# Patient Record
Sex: MALE | Race: WHITE | NOT HISPANIC OR LATINO | URBAN - METROPOLITAN AREA
[De-identification: names, ages, dates, MRNs, and addresses within clinical notes are randomized per-mention and may not be internally consistent; named-entity substitution may affect disease eponyms.]

---

## 2017-06-29 ENCOUNTER — APPOINTMENT (OUTPATIENT)
Dept: URBAN - METROPOLITAN AREA CLINIC 219 | Age: 57
Setting detail: DERMATOLOGY
End: 2017-06-29

## 2017-06-29 DIAGNOSIS — L30.8 OTHER SPECIFIED DERMATITIS: ICD-10-CM

## 2017-06-29 DIAGNOSIS — L82.1 OTHER SEBORRHEIC KERATOSIS: ICD-10-CM

## 2017-06-29 DIAGNOSIS — B35.1 TINEA UNGUIUM: ICD-10-CM

## 2017-06-29 DIAGNOSIS — B07.8 OTHER VIRAL WARTS: ICD-10-CM

## 2017-06-29 DIAGNOSIS — L20.84 INTRINSIC (ALLERGIC) ECZEMA: ICD-10-CM

## 2017-06-29 DIAGNOSIS — Z80.8 FAMILY HISTORY OF MALIGNANT NEOPLASM OF OTHER ORGANS OR SYSTEMS: ICD-10-CM

## 2017-06-29 DIAGNOSIS — B35.3 TINEA PEDIS: ICD-10-CM

## 2017-06-29 PROBLEM — I48.91 UNSPECIFIED ATRIAL FIBRILLATION: Status: ACTIVE | Noted: 2017-06-29

## 2017-06-29 PROBLEM — E78.5 HYPERLIPIDEMIA, UNSPECIFIED: Status: ACTIVE | Noted: 2017-06-29

## 2017-06-29 PROBLEM — I10 ESSENTIAL (PRIMARY) HYPERTENSION: Status: ACTIVE | Noted: 2017-06-29

## 2017-06-29 PROCEDURE — OTHER TREATMENT REGIMEN: OTHER

## 2017-06-29 PROCEDURE — OTHER LIQUID NITROGEN: OTHER

## 2017-06-29 PROCEDURE — 99213 OFFICE O/P EST LOW 20 MIN: CPT | Mod: 25

## 2017-06-29 PROCEDURE — OTHER COUNSELING: OTHER

## 2017-06-29 PROCEDURE — 17110 DESTRUCT B9 LESION 1-14: CPT

## 2017-06-29 ASSESSMENT — LOCATION DETAILED DESCRIPTION DERM
LOCATION DETAILED: RIGHT DISTAL RADIAL DORSAL MIDDLE FINGER
LOCATION DETAILED: RIGHT MID DORSAL INDEX FINGER
LOCATION DETAILED: RIGHT INDEX FINGERTIP
LOCATION DETAILED: LEFT MEDIAL UPPER BACK
LOCATION DETAILED: LEFT KNEE
LOCATION DETAILED: RIGHT MIDDLE FINGERTIP
LOCATION DETAILED: LEFT VENTRAL MEDIAL PROXIMAL FOREARM
LOCATION DETAILED: LEFT VENTRAL PROXIMAL FOREARM
LOCATION DETAILED: LEFT INDEX FINGERTIP
LOCATION DETAILED: RIGHT GREAT TOENAIL
LOCATION DETAILED: LEFT GREAT TOENAIL

## 2017-06-29 ASSESSMENT — LOCATION SIMPLE DESCRIPTION DERM
LOCATION SIMPLE: RIGHT MIDDLE FINGER
LOCATION SIMPLE: LEFT KNEE
LOCATION SIMPLE: LEFT UPPER BACK
LOCATION SIMPLE: RIGHT INDEX FINGER
LOCATION SIMPLE: RIGHT GREAT TOE
LOCATION SIMPLE: LEFT FOREARM
LOCATION SIMPLE: LEFT GREAT TOE
LOCATION SIMPLE: LEFT INDEX FINGER

## 2017-06-29 ASSESSMENT — LOCATION ZONE DERM
LOCATION ZONE: TOENAIL
LOCATION ZONE: LEG
LOCATION ZONE: TRUNK
LOCATION ZONE: FINGER
LOCATION ZONE: ARM

## 2017-06-29 NOTE — PROCEDURE: TREATMENT REGIMEN
Detail Level: Detailed
Continue Regimen: Clobetasol cream twice a day as needed\\nO'Keeffe's working hands 3-4 times per day
Plan: Consider candida extract injections if no improvement
Detail Level: Simple
Continue Regimen: Naftin 2 % gel two times a day to affected toenails
Continue Regimen: Clobetasol Cream twice a day as needed for eczema flares
Continue Regimen: Zeasorb AF Powder\\nDrying technique\\nNaftin Gel 2% once a day

## 2017-06-29 NOTE — PROCEDURE: LIQUID NITROGEN
Include Z78.9 (Other Specified Conditions Influencing Health Status) As An Associated Diagnosis?: No
Number Of Freeze-Thaw Cycles: 2 freeze-thaw cycles
Consent: The patient's consent was obtained including but not limited to risks of crusting, scabbing, blistering, scarring, darker or lighter pigmentary change, recurrence, incomplete removal and infection.
Medical Necessity Information: It is in your best interest to select a reason for this procedure from the list below. All of these items fulfill various CMS LCD requirements except the new and changing color options.
Medical Necessity Clause: This procedure was medically necessary because the lesions that were treated were:
Post-Care Instructions: I reviewed with the patient in detail post-care instructions. Patient is to wear sunprotection, and avoid picking at any of the treated lesions. Pt may apply Vaseline to crusted or scabbing areas.
Detail Level: Detailed

## 2017-11-10 ENCOUNTER — APPOINTMENT (OUTPATIENT)
Dept: URBAN - METROPOLITAN AREA CLINIC 219 | Age: 57
Setting detail: DERMATOLOGY
End: 2017-11-10

## 2017-11-10 DIAGNOSIS — L57.0 ACTINIC KERATOSIS: ICD-10-CM

## 2017-11-10 DIAGNOSIS — B07.8 OTHER VIRAL WARTS: ICD-10-CM

## 2017-11-10 DIAGNOSIS — L71.8 OTHER ROSACEA: ICD-10-CM

## 2017-11-10 DIAGNOSIS — L82.1 OTHER SEBORRHEIC KERATOSIS: ICD-10-CM

## 2017-11-10 DIAGNOSIS — B35.1 TINEA UNGUIUM: ICD-10-CM

## 2017-11-10 DIAGNOSIS — L30.8 OTHER SPECIFIED DERMATITIS: ICD-10-CM

## 2017-11-10 DIAGNOSIS — L20.84 INTRINSIC (ALLERGIC) ECZEMA: ICD-10-CM

## 2017-11-10 DIAGNOSIS — B35.3 TINEA PEDIS: ICD-10-CM

## 2017-11-10 DIAGNOSIS — Z80.8 FAMILY HISTORY OF MALIGNANT NEOPLASM OF OTHER ORGANS OR SYSTEMS: ICD-10-CM

## 2017-11-10 PROBLEM — E78.5 HYPERLIPIDEMIA, UNSPECIFIED: Status: ACTIVE | Noted: 2017-11-10

## 2017-11-10 PROBLEM — J30.1 ALLERGIC RHINITIS DUE TO POLLEN: Status: ACTIVE | Noted: 2017-11-10

## 2017-11-10 PROBLEM — L85.3 XEROSIS CUTIS: Status: ACTIVE | Noted: 2017-11-10

## 2017-11-10 PROBLEM — I48.91 UNSPECIFIED ATRIAL FIBRILLATION: Status: ACTIVE | Noted: 2017-11-10

## 2017-11-10 PROBLEM — I10 ESSENTIAL (PRIMARY) HYPERTENSION: Status: ACTIVE | Noted: 2017-11-10

## 2017-11-10 PROCEDURE — OTHER LIQUID NITROGEN: OTHER

## 2017-11-10 PROCEDURE — OTHER REASSURANCE: OTHER

## 2017-11-10 PROCEDURE — OTHER TREATMENT REGIMEN: OTHER

## 2017-11-10 PROCEDURE — 99213 OFFICE O/P EST LOW 20 MIN: CPT | Mod: 25

## 2017-11-10 PROCEDURE — 17000 DESTRUCT PREMALG LESION: CPT | Mod: 59

## 2017-11-10 PROCEDURE — 17110 DESTRUCT B9 LESION 1-14: CPT

## 2017-11-10 PROCEDURE — OTHER COUNSELING: OTHER

## 2017-11-10 PROCEDURE — OTHER MIPS QUALITY: OTHER

## 2017-11-10 PROCEDURE — OTHER PRESCRIPTION: OTHER

## 2017-11-10 RX ORDER — METRONIDAZOLE 10 MG/G
APPLY GEL TOPICAL ONCE DAILY
Qty: 1 | Refills: 3 | Status: ERX

## 2017-11-10 ASSESSMENT — LOCATION DETAILED DESCRIPTION DERM
LOCATION DETAILED: RIGHT INDEX FINGERTIP
LOCATION DETAILED: RIGHT DISTAL VENTRAL THUMB
LOCATION DETAILED: INFERIOR THORACIC SPINE
LOCATION DETAILED: LEFT GREAT TOENAIL
LOCATION DETAILED: RIGHT GREAT TOENAIL
LOCATION DETAILED: RIGHT INFERIOR LATERAL FOREHEAD
LOCATION DETAILED: RIGHT MIDDLE FINGERTIP
LOCATION DETAILED: RIGHT PROXIMAL POSTERIOR UPPER ARM
LOCATION DETAILED: RIGHT DISTAL RADIAL DORSAL MIDDLE FINGER
LOCATION DETAILED: LEFT INDEX FINGERTIP
LOCATION DETAILED: PERIUNGUAL SKIN RIGHT MIDDLE FINGER

## 2017-11-10 ASSESSMENT — LOCATION ZONE DERM
LOCATION ZONE: FINGER
LOCATION ZONE: FACE
LOCATION ZONE: TRUNK
LOCATION ZONE: TOENAIL
LOCATION ZONE: ARM

## 2017-11-10 ASSESSMENT — LOCATION SIMPLE DESCRIPTION DERM
LOCATION SIMPLE: UPPER BACK
LOCATION SIMPLE: RIGHT FOREHEAD
LOCATION SIMPLE: RIGHT MIDDLE FINGER
LOCATION SIMPLE: RIGHT GREAT TOE
LOCATION SIMPLE: LEFT GREAT TOE
LOCATION SIMPLE: RIGHT UPPER ARM
LOCATION SIMPLE: RIGHT THUMB
LOCATION SIMPLE: RIGHT INDEX FINGER
LOCATION SIMPLE: LEFT INDEX FINGER

## 2017-11-10 NOTE — PROCEDURE: LIQUID NITROGEN
Medical Necessity Information: It is in your best interest to select a reason for this procedure from the list below. All of these items fulfill various CMS LCD requirements except the new and changing color options.
Add 52 Modifier (Optional): no
Post-Care Instructions: I reviewed with the patient in detail post-care instructions. Patient is to wear sunprotection, and avoid picking at any of the treated lesions. Pt may apply Vaseline to crusted or scabbing areas.
Number Of Freeze-Thaw Cycles: 1 freeze-thaw cycle
Consent: The patient's consent was obtained including but not limited to risks of crusting, scabbing, blistering, scarring, darker or lighter pigmentary change, recurrence, incomplete removal and infection.
Duration Of Freeze Thaw-Cycle (Seconds): 0
Medical Necessity Clause: This procedure was medically necessary because the lesions that were treated were:
Number Of Freeze-Thaw Cycles: 2 freeze-thaw cycles
Detail Level: Detailed

## 2017-11-10 NOTE — PROCEDURE: TREATMENT REGIMEN
Continue Regimen: Clobetasol cream twice a day as needed\\nO'Keeffe's working hands 3-4 times per day
Detail Level: Detailed
Continue Regimen: Naftin 2 % gel two times a day to affected toenails
Continue Regimen: Zeasorb AF Powder\\nDrying technique\\nNaftin Gel 2% once a day
Plan: Metrogel 1% once a day \\nDaily moisturizer with sunscreen
Detail Level: Simple
Plan: Compounded Salicylic Acid to residual as tolerated
Continue Regimen: Clobetasol Cream twice a day as needed for eczema flares

## 2018-04-27 ENCOUNTER — APPOINTMENT (OUTPATIENT)
Dept: URBAN - METROPOLITAN AREA CLINIC 219 | Age: 58
Setting detail: DERMATOLOGY
End: 2018-04-27

## 2018-04-27 DIAGNOSIS — Z80.8 FAMILY HISTORY OF MALIGNANT NEOPLASM OF OTHER ORGANS OR SYSTEMS: ICD-10-CM

## 2018-04-27 DIAGNOSIS — B35.3 TINEA PEDIS: ICD-10-CM

## 2018-04-27 DIAGNOSIS — L71.8 OTHER ROSACEA: ICD-10-CM

## 2018-04-27 DIAGNOSIS — L82.1 OTHER SEBORRHEIC KERATOSIS: ICD-10-CM

## 2018-04-27 DIAGNOSIS — B07.8 OTHER VIRAL WARTS: ICD-10-CM

## 2018-04-27 DIAGNOSIS — B35.1 TINEA UNGUIUM: ICD-10-CM

## 2018-04-27 DIAGNOSIS — L20.84 INTRINSIC (ALLERGIC) ECZEMA: ICD-10-CM

## 2018-04-27 DIAGNOSIS — L30.8 OTHER SPECIFIED DERMATITIS: ICD-10-CM

## 2018-04-27 PROBLEM — I48.91 UNSPECIFIED ATRIAL FIBRILLATION: Status: ACTIVE | Noted: 2018-04-27

## 2018-04-27 PROBLEM — I10 ESSENTIAL (PRIMARY) HYPERTENSION: Status: ACTIVE | Noted: 2018-04-27

## 2018-04-27 PROBLEM — L85.3 XEROSIS CUTIS: Status: ACTIVE | Noted: 2018-04-27

## 2018-04-27 PROCEDURE — 17110 DESTRUCT B9 LESION 1-14: CPT

## 2018-04-27 PROCEDURE — OTHER REASSURANCE: OTHER

## 2018-04-27 PROCEDURE — 99213 OFFICE O/P EST LOW 20 MIN: CPT | Mod: 25

## 2018-04-27 PROCEDURE — OTHER COUNSELING: OTHER

## 2018-04-27 PROCEDURE — OTHER LIQUID NITROGEN: OTHER

## 2018-04-27 PROCEDURE — OTHER PRESCRIPTION: OTHER

## 2018-04-27 PROCEDURE — OTHER TREATMENT REGIMEN: OTHER

## 2018-04-27 PROCEDURE — OTHER MIPS QUALITY: OTHER

## 2018-04-27 RX ORDER — CLOBETASOL PROPIONATE 0.5 MG/G
APPLY CREAM TOPICAL
Qty: 1 | Refills: 3 | Status: ERX

## 2018-04-27 RX ORDER — NAFTIFINE HYDROCHLORIDE 2 G/100G
APPLY GEL TOPICAL AS NEEDED
Qty: 1 | Refills: 3 | Status: ERX

## 2018-04-27 ASSESSMENT — LOCATION DETAILED DESCRIPTION DERM
LOCATION DETAILED: LEFT INDEX FINGERTIP
LOCATION DETAILED: RIGHT MIDDLE FINGERTIP
LOCATION DETAILED: RIGHT INDEX FINGERTIP
LOCATION DETAILED: RIGHT MID RADIAL DORSAL MIDDLE FINGER
LOCATION DETAILED: RIGHT SUPERIOR MEDIAL MIDBACK
LOCATION DETAILED: LEFT GREAT TOENAIL
LOCATION DETAILED: RIGHT GREAT TOENAIL

## 2018-04-27 ASSESSMENT — LOCATION ZONE DERM
LOCATION ZONE: TOENAIL
LOCATION ZONE: FINGER
LOCATION ZONE: TRUNK

## 2018-04-27 ASSESSMENT — LOCATION SIMPLE DESCRIPTION DERM
LOCATION SIMPLE: LEFT GREAT TOE
LOCATION SIMPLE: LEFT INDEX FINGER
LOCATION SIMPLE: RIGHT MIDDLE FINGER
LOCATION SIMPLE: RIGHT GREAT TOE
LOCATION SIMPLE: RIGHT LOWER BACK
LOCATION SIMPLE: RIGHT INDEX FINGER

## 2018-04-27 NOTE — PROCEDURE: TREATMENT REGIMEN
Detail Level: Detailed
Detail Level: Simple
Continue Regimen: Metrogel 1% once a day \\nDaily moisturizer with sunscreen
Continue Regimen: Naftin 2 % gel two times a day to affected toenails
Continue Regimen: Clobetasol cream twice a day as needed\\nO'Keeffe's working hands 3-4 times per day
Continue Regimen: Clobetasol Cream twice a day as needed for eczema flares
Continue Regimen: Compounded Salicylic Acid to residual as tolerated
Continue Regimen: Zeasorb AF Powder\\nDrying technique\\nNaftin Gel 2% twice a day

## 2018-04-27 NOTE — PROCEDURE: LIQUID NITROGEN
Detail Level: Detailed
Add 52 Modifier (Optional): no
Number Of Freeze-Thaw Cycles: 2 freeze-thaw cycles
Post-Care Instructions: I reviewed with the patient in detail post-care instructions. Patient is to wear sunprotection, and avoid picking at any of the treated lesions. Pt may apply Vaseline to crusted or scabbing areas.
Medical Necessity Information: It is in your best interest to select a reason for this procedure from the list below. All of these items fulfill various CMS LCD requirements except the new and changing color options.
Consent: The patient's consent was obtained including but not limited to risks of crusting, scabbing, blistering, scarring, darker or lighter pigmentary change, recurrence, incomplete removal and infection.
Medical Necessity Clause: This procedure was medically necessary because the lesions that were treated were:

## 2018-05-11 ENCOUNTER — APPOINTMENT (OUTPATIENT)
Age: 58
Setting detail: DERMATOLOGY
End: 2018-05-15

## 2018-05-11 DIAGNOSIS — D22 MELANOCYTIC NEVI: ICD-10-CM

## 2018-05-11 DIAGNOSIS — D485 NEOPLASM OF UNCERTAIN BEHAVIOR OF SKIN: ICD-10-CM

## 2018-05-11 DIAGNOSIS — L91.8 OTHER HYPERTROPHIC DISORDERS OF THE SKIN: ICD-10-CM

## 2018-05-11 PROBLEM — D48.5 NEOPLASM OF UNCERTAIN BEHAVIOR OF SKIN: Status: ACTIVE | Noted: 2018-05-11

## 2018-05-11 PROBLEM — D22.39 MELANOCYTIC NEVI OF OTHER PARTS OF FACE: Status: ACTIVE | Noted: 2018-05-11

## 2018-05-11 PROBLEM — D23.5 OTHER BENIGN NEOPLASM OF SKIN OF TRUNK: Status: ACTIVE | Noted: 2018-05-11

## 2018-05-11 PROCEDURE — OTHER DEFER: OTHER

## 2018-05-11 PROCEDURE — OTHER MIPS QUALITY: OTHER

## 2018-05-11 PROCEDURE — OTHER COUNSELING: OTHER

## 2018-05-11 PROCEDURE — 99203 OFFICE O/P NEW LOW 30 MIN: CPT | Mod: 25

## 2018-05-11 PROCEDURE — OTHER BIOPSY BY SHAVE METHOD: OTHER

## 2018-05-11 PROCEDURE — OTHER REASSURANCE: OTHER

## 2018-05-11 PROCEDURE — 11100: CPT

## 2018-05-11 ASSESSMENT — LOCATION SIMPLE DESCRIPTION DERM
LOCATION SIMPLE: RIGHT ANTERIOR NECK
LOCATION SIMPLE: LEFT ANTERIOR NECK
LOCATION SIMPLE: LEFT CHEEK
LOCATION SIMPLE: RIGHT CHEEK
LOCATION SIMPLE: INFERIOR FOREHEAD
LOCATION SIMPLE: LEFT NOSE

## 2018-05-11 ASSESSMENT — LOCATION ZONE DERM
LOCATION ZONE: NECK
LOCATION ZONE: NOSE
LOCATION ZONE: FACE

## 2018-05-11 ASSESSMENT — LOCATION DETAILED DESCRIPTION DERM
LOCATION DETAILED: RIGHT CENTRAL MALAR CHEEK
LOCATION DETAILED: LEFT NASAL SIDEWALL
LOCATION DETAILED: LEFT CLAVICULAR NECK
LOCATION DETAILED: RIGHT CLAVICULAR NECK
LOCATION DETAILED: INFERIOR MID FOREHEAD
LOCATION DETAILED: LEFT SUPERIOR MEDIAL BUCCAL CHEEK

## 2018-12-15 LAB
CREATININE, EXTERNAL: 1.13
HBA1C MFR BLD HPLC: 10.9 %
MICROALBUMIN UR TEST STR-MCNC: 3486.6 MG/DL

## 2019-02-14 ENCOUNTER — OFFICE VISIT (OUTPATIENT)
Dept: ENDOCRINOLOGY | Age: 59
End: 2019-02-14

## 2019-02-14 VITALS
WEIGHT: 315 LBS | BODY MASS INDEX: 42.66 KG/M2 | DIASTOLIC BLOOD PRESSURE: 75 MMHG | HEIGHT: 72 IN | HEART RATE: 77 BPM | SYSTOLIC BLOOD PRESSURE: 138 MMHG

## 2019-02-14 DIAGNOSIS — R80.9 TYPE 2 DIABETES MELLITUS WITH MICROALBUMINURIA, WITH LONG-TERM CURRENT USE OF INSULIN (HCC): Primary | ICD-10-CM

## 2019-02-14 DIAGNOSIS — Z79.4 TYPE 2 DIABETES MELLITUS WITH MICROALBUMINURIA, WITH LONG-TERM CURRENT USE OF INSULIN (HCC): Primary | ICD-10-CM

## 2019-02-14 DIAGNOSIS — E66.01 MORBID OBESITY (HCC): ICD-10-CM

## 2019-02-14 DIAGNOSIS — E11.29 TYPE 2 DIABETES MELLITUS WITH MICROALBUMINURIA, WITH LONG-TERM CURRENT USE OF INSULIN (HCC): Primary | ICD-10-CM

## 2019-02-14 RX ORDER — INSULIN LISPRO 100 [IU]/ML
20 INJECTION, SOLUTION INTRAVENOUS; SUBCUTANEOUS 2 TIMES DAILY
Refills: 0 | COMMUNITY
Start: 2019-01-21 | End: 2019-03-27 | Stop reason: SDUPTHER

## 2019-02-14 RX ORDER — INSULIN GLARGINE 100 [IU]/ML
74 INJECTION, SOLUTION SUBCUTANEOUS DAILY
Refills: 0 | COMMUNITY
Start: 2018-12-05

## 2019-02-14 RX ORDER — GLIPIZIDE 10 MG/1
10 TABLET, FILM COATED, EXTENDED RELEASE ORAL 2 TIMES DAILY
Refills: 0 | COMMUNITY
Start: 2018-12-20

## 2019-02-14 RX ORDER — ENALAPRIL MALEATE 20 MG/1
20 TABLET ORAL DAILY
Refills: 0 | COMMUNITY
Start: 2019-01-15

## 2019-02-14 RX ORDER — HYDROCHLOROTHIAZIDE 25 MG/1
TABLET ORAL
Refills: 0 | COMMUNITY
Start: 2019-01-30

## 2019-02-14 RX ORDER — METFORMIN HYDROCHLORIDE 1000 MG/1
1000 TABLET ORAL 2 TIMES DAILY
Refills: 0 | COMMUNITY
Start: 2018-12-20

## 2019-02-14 RX ORDER — AMLODIPINE BESYLATE 10 MG/1
10 TABLET ORAL DAILY
Refills: 1 | COMMUNITY
Start: 2018-12-20

## 2019-02-14 RX ORDER — ATENOLOL 100 MG/1
100 TABLET ORAL DAILY
Refills: 1 | COMMUNITY
Start: 2019-01-30

## 2019-02-14 RX ORDER — PRAVASTATIN SODIUM 80 MG/1
80 TABLET ORAL
Refills: 0 | COMMUNITY
Start: 2019-01-15

## 2019-02-14 NOTE — PROGRESS NOTES
Chief Complaint   Patient presents with    New Patient     PCP and Pharmacy verified    Diabetes     Patient stated that he has had a recent A1c done by Dr. Shaunna Guo Diabetic Foot Exam     Due    Other     Checks blood sugars once daily        HPI  This is a 58-year-old white male with a 10-year history of type 2 diabetes mellitus referred for evaluation and management. As far as he knows there is no family history of diabetes. His mother has pancreatic cancer. He says he was diagnosed with diabetes when he presented with a cyst on his left thigh. He was found to have a blood sugar of 300 and started on medications. He is apparently been on a number of medications including metformin, Januvia, Tanzeum, and most recently Basaglar 74 units at bedtime and Humalog 8 units with dinner. He works as a dispatcher for [employer]. His most recent A1c is 10.9%. He has marked microalbuminuria and a creatinine of 1.13. His current medical regimen is as follows:  Basaglar 74 units at bedtime  Humalog 8 units with dinner  Metformin 1000 mg twice daily  Glucotrol XL 10 mg twice daily    He works alternating days and nights from [address] [place] [address] to [employer] or [address] [place] [address]. He usually has a breakfast meal of oatmeal and yogurt and coffee. His midday meal is a lean cuisine. Adriana Shay is the main meal of the day. This can be a hamburger pork chop or fried chicken with starchy and non-starchy vegetables. He is trying to limit the pasta in his diet. However \"he loves his potatoes\". He brings us his One Touch ultra meter. We checked the blood sugars and there are days where he has fasting blood sugars of 150-200. There are other days where his blood sugars are in the 3-400 range. Most of these readings are consistent with the A1c of 10.9%. ROS  He denies chest pain, shortness of breath, constipation or diarrhea  No family history on file.      Social History     Socioeconomic History    Marital status: UNKNOWN     Spouse name: Not on file    Number of children: Not on file    Years of education: Not on file    Highest education level: Not on file   Tobacco Use    Smoking status: Former Smoker    Smokeless tobacco: Never Used        Vitals:    02/14/19 1121   BP: 138/75   Pulse: 77   Weight: 323 lb 12.8 oz (146.9 kg)   Height: 6' (1.829 m)   PainSc:   0 - No pain        Physical Examination: General appearance - alert, well appearing, and in no distress  Mental status - alert, oriented to person, place, and time  Neck - supple, no significant adenopathy  Chest - clear to auscultation, no wheezes, rales or rhonchi, symmetric air entry  Heart - normal rate, regular rhythm, normal S1, S2, no murmurs, rubs, clicks or gallops  Abdomen - soft, nontender, nondistended, no masses or organomegaly  Extremities - pedal edema there is marked brawny edema    Diabetic foot exam:     Left: Reflexes 1+     Vibratory sensation diminished    Filament test reduced sensation with micro filament   Pulse DP: 1+ (weak)   Pulse PT: 1+ (weak)   Deformities: None  Right: Reflexes 1+   Vibratory sensation diminished   Filament test reduced sensation with micro filament   Pulse DP: 1+ (weak)   Pulse PT: 1+ (weak)   Deformities: None      ASSESSMENT & PLAN  This gentleman has type 2 diabetes mellitus, morbid obesity, poor blood sugar control and evidence of diabetic neuropathy and diabetic kidney disease. He would appear to be an ideal candidate for a GLP-1. He says he was on it in the past and he did not think it did much for him. He also does describe the fact that when his grandson was born his son told him he had to quit smoking. He quit smoking but gained 30 pounds. I strategy is as follows:  1. We continued the Basaglar at 74 units in the evening  2. We suggested he increase the Humalog to 10 units with each of his 3 meals  3. We asked him to send us blood sugar readings. 4.  We will see him back in 1 month. We may well reconsider a GLP-1 for him.     ADDENDUM: Received blood sugars 3/7/2019    -250  -250    Will increase mealtime insulin to 15 units TID until next visit

## 2019-03-18 ENCOUNTER — OFFICE VISIT (OUTPATIENT)
Dept: ENDOCRINOLOGY | Age: 59
End: 2019-03-18

## 2019-03-18 VITALS
HEIGHT: 72 IN | SYSTOLIC BLOOD PRESSURE: 142 MMHG | HEART RATE: 77 BPM | WEIGHT: 315 LBS | BODY MASS INDEX: 42.66 KG/M2 | DIASTOLIC BLOOD PRESSURE: 73 MMHG

## 2019-03-18 DIAGNOSIS — R80.9 TYPE 2 DIABETES MELLITUS WITH MICROALBUMINURIA, WITH LONG-TERM CURRENT USE OF INSULIN (HCC): Primary | ICD-10-CM

## 2019-03-18 DIAGNOSIS — Z79.4 TYPE 2 DIABETES MELLITUS WITH MICROALBUMINURIA, WITH LONG-TERM CURRENT USE OF INSULIN (HCC): Primary | ICD-10-CM

## 2019-03-18 DIAGNOSIS — E11.29 TYPE 2 DIABETES MELLITUS WITH MICROALBUMINURIA, WITH LONG-TERM CURRENT USE OF INSULIN (HCC): Primary | ICD-10-CM

## 2019-03-18 DIAGNOSIS — E66.01 MORBID OBESITY (HCC): ICD-10-CM

## 2019-03-18 PROBLEM — E11.65 TYPE 2 DIABETES MELLITUS WITH HYPERGLYCEMIA, WITH LONG-TERM CURRENT USE OF INSULIN (HCC): Status: ACTIVE | Noted: 2019-03-18

## 2019-03-18 NOTE — PROGRESS NOTES
Mr. Karli Andrade returns for follow-up of his type 2 diabetes mellitus and morbid obesity. He has been taking Lantus insulin 74 units daily along with 20 units of Humalog with meals along with metformin 1000 mg twice daily. His blood sugars remain elevated. He has most of his blood sugar readings for the last month have been in the 180-300 range. Interestingly, he has a couple blood sugars first thing in the morning which are either 77 or 138 which is distinctly different from all the other readings. On closer conversation, it appears that the lower blood sugars are because he did not snack the night before. Unfortunately this does not happen very often but when it does happen he can have excellent blood sugars. He denies symptoms of hyper or hypoglycemia. Examination  Blood pressure 142/73  Pulse 77  Weight 327    Impression type 2 diabetes mellitus and morbid obesity with an elevated hemoglobin A1c and persistently elevated blood sugars with a few exceptions  Plan: We have done the followin. He was started on O'zempic  0.25 mg to be advanced to 0.5 mg weekly  2. We have asked him to for 1 week eliminate the bedtime snacks and see if the morning blood sugars are consistently better. We suspect that they will be.  3.  We will see him back in 1 month. We anticipate an increase to 1 mg of Ozempic going forward.

## 2019-03-20 DIAGNOSIS — Z79.4 TYPE 2 DIABETES MELLITUS WITH HYPERGLYCEMIA, WITH LONG-TERM CURRENT USE OF INSULIN (HCC): Primary | ICD-10-CM

## 2019-03-20 DIAGNOSIS — E11.65 TYPE 2 DIABETES MELLITUS WITH HYPERGLYCEMIA, WITH LONG-TERM CURRENT USE OF INSULIN (HCC): Primary | ICD-10-CM

## 2019-04-17 LAB — HBA1C MFR BLD HPLC: 7.9 %

## 2019-04-25 ENCOUNTER — OFFICE VISIT (OUTPATIENT)
Dept: ENDOCRINOLOGY | Age: 59
End: 2019-04-25

## 2019-04-25 VITALS
WEIGHT: 315 LBS | HEART RATE: 78 BPM | HEIGHT: 72 IN | BODY MASS INDEX: 42.66 KG/M2 | DIASTOLIC BLOOD PRESSURE: 83 MMHG | SYSTOLIC BLOOD PRESSURE: 155 MMHG

## 2019-04-25 DIAGNOSIS — Z79.4 TYPE 2 DIABETES MELLITUS WITH MICROALBUMINURIA, WITH LONG-TERM CURRENT USE OF INSULIN (HCC): Primary | ICD-10-CM

## 2019-04-25 DIAGNOSIS — R80.9 TYPE 2 DIABETES MELLITUS WITH MICROALBUMINURIA, WITH LONG-TERM CURRENT USE OF INSULIN (HCC): Primary | ICD-10-CM

## 2019-04-25 DIAGNOSIS — E11.29 TYPE 2 DIABETES MELLITUS WITH MICROALBUMINURIA, WITH LONG-TERM CURRENT USE OF INSULIN (HCC): Primary | ICD-10-CM

## 2019-04-25 NOTE — PROGRESS NOTES
Mr. Farhan James returns for follow-up of his type 2 diabetes mellitus. He is now on Ozempic 0.5 mg weekly, Basaglar insulin 74 units daily, metformin thousand milligrams twice daily, glipizide 10 mg twice daily and Humalog insulin 20 units with meals. Since we started the Ozempic, his A1c has fallen from 10.9% down to 7.9%. His blood sugars are in the range of 90-1 50 and is not having any hypoglycemia. He is very pleased. He also has what lost 6 pounds since we saw him in March. His diet is unchanged. He does continue to have occasional mild nausea with the Ozempic but this is clearly getting better from when we started with the medications. Examination  Blood pressure 155/83  Pulse 78  Weight 321    Impression type 2 diabetes mellitus with improving glucose control on combination therapy  Plan: We asked him to stay in touch. We anticipate decreasing the doses of the Humalog and then probably eliminating the glipizide as well. Given the intermittent nausea, we did not advance the Ozempic to 1 mg but will continue at 0.5 mg weekly. We will see him back in 5 to 6 weeks. We spent more than 25 mintutes face to face, more than 50% was in counseling regarding diet, exercise and carbohydrate intake.

## 2019-05-17 ENCOUNTER — TELEPHONE (OUTPATIENT)
Dept: ENDOCRINOLOGY | Age: 59
End: 2019-05-17

## 2019-05-17 NOTE — TELEPHONE ENCOUNTER
Spoke to pt. He cannot tolerate 0.5 mg Ozempic. BS improving but intolerable so will stop medication. ?  Restart at 0.25 mg

## 2019-06-03 ENCOUNTER — OFFICE VISIT (OUTPATIENT)
Dept: ENDOCRINOLOGY | Age: 59
End: 2019-06-03

## 2019-06-03 VITALS
RESPIRATION RATE: 16 BRPM | DIASTOLIC BLOOD PRESSURE: 80 MMHG | HEART RATE: 74 BPM | OXYGEN SATURATION: 97 % | BODY MASS INDEX: 42.66 KG/M2 | SYSTOLIC BLOOD PRESSURE: 157 MMHG | HEIGHT: 72 IN | WEIGHT: 315 LBS

## 2019-06-03 DIAGNOSIS — R80.9 TYPE 2 DIABETES MELLITUS WITH MICROALBUMINURIA, WITH LONG-TERM CURRENT USE OF INSULIN (HCC): Primary | ICD-10-CM

## 2019-06-03 DIAGNOSIS — E11.29 TYPE 2 DIABETES MELLITUS WITH MICROALBUMINURIA, WITH LONG-TERM CURRENT USE OF INSULIN (HCC): Primary | ICD-10-CM

## 2019-06-03 DIAGNOSIS — Z79.4 TYPE 2 DIABETES MELLITUS WITH MICROALBUMINURIA, WITH LONG-TERM CURRENT USE OF INSULIN (HCC): Primary | ICD-10-CM

## 2019-06-03 NOTE — PROGRESS NOTES
Lab Results   Component Value Date/Time    Hemoglobin A1c, External 7.9 04/17/2019    Hemoglobin A1c, External 10.9 12/15/2018     Lab Results   Component Value Date/Time    Hemoglobin A1c, External 7.9 04/17/2019     Diabetic Foot and Eye Exam HM Status   Topic Date Due    Eye Exam  10/31/1970    Diabetic Foot Care  03/08/2020

## 2019-06-03 NOTE — PROGRESS NOTES
Mr. Hortensia Butts returns for follow-up of his type 2 diabetes mellitus with morbid obesity and an A1c in April of 7.9%. He has been on metformin thousand milligrams twice daily, Basaglar 74 units daily, Humalog 20 units with meals, and glipizide 10 mg twice daily. We added Ozempic. He was started on 0.25 mg and then we and then increased to 0.5 mg.  He did reasonably well but then developed GI side effects and so has stopped the Ozempic. Since stopping the Ozempic, his blood sugars have gone up a bit. In general his fasting blood sugars are in the 1 20-1 80 range with higher numbers in the morning after dietary indiscretion the night before. Breakfast is usually eggs ham 2 pieces of toast and mosher. He usually does not have lunch. Dinner can be a pork chop BMs and green beans. Again when he has ice cream and cookies at night the blood sugar can be 250. He does tell us that since stopping the Ozempic, his GI side effects have improved significantly. Examination  Blood pressure 157/80  Pulse 74  Weight 320    Impression type 2 diabetes mellitus with improved glucose control on Ozempic and deteriorating glucose control now that he is off Ozempic for the last couple of weeks. Plan: We decided to cautiously go back to 0.25 mg weekly for the next 2 months. We will see him in 2 months and repeat his A1c at that time. He will continue the other medications as noted above. We spent more than 25 mintutes face to face, more than 50% was in counseling regarding diet, exercise and carbohydrate intake.

## 2019-09-11 ENCOUNTER — OFFICE VISIT (OUTPATIENT)
Dept: ENDOCRINOLOGY | Age: 59
End: 2019-09-11

## 2019-09-11 VITALS
BODY MASS INDEX: 42.66 KG/M2 | HEART RATE: 82 BPM | WEIGHT: 315 LBS | DIASTOLIC BLOOD PRESSURE: 80 MMHG | HEIGHT: 72 IN | SYSTOLIC BLOOD PRESSURE: 134 MMHG

## 2019-09-11 DIAGNOSIS — E11.29 TYPE 2 DIABETES MELLITUS WITH MICROALBUMINURIA, WITH LONG-TERM CURRENT USE OF INSULIN (HCC): Primary | ICD-10-CM

## 2019-09-11 DIAGNOSIS — R80.9 TYPE 2 DIABETES MELLITUS WITH MICROALBUMINURIA, WITH LONG-TERM CURRENT USE OF INSULIN (HCC): Primary | ICD-10-CM

## 2019-09-11 DIAGNOSIS — Z79.4 TYPE 2 DIABETES MELLITUS WITH MICROALBUMINURIA, WITH LONG-TERM CURRENT USE OF INSULIN (HCC): Primary | ICD-10-CM

## 2019-09-11 LAB — HBA1C MFR BLD HPLC: 6.1 %

## 2019-09-11 RX ORDER — FUROSEMIDE 20 MG/1
20 TABLET ORAL DAILY
Qty: 90 TAB | Refills: 3 | Status: SHIPPED | OUTPATIENT
Start: 2019-09-11 | End: 2020-08-21 | Stop reason: SDUPTHER

## 2019-09-11 NOTE — PROGRESS NOTES
Mr. Reynaldo Baca returns for follow-up of his type 2 diabetes mellitus with morbid obesity and an A1c in April of 7.9%. He has been on metformin thousand milligrams twice daily, Basaglar 74 units daily, Humalog 20 units with meals, and glipizide 10 mg twice daily. We added Ozempic. He was started on 0.25 mg and then we and then increased to 0.5 mg.  He did not tolerate the 0.5 mg dose and so he is back to doing 0.25 mg weekly for the most part he is tolerating that. His A1c now is down to 6.1%. So his current medical regimen is Ozempic 0.25 mg weekly, Basaglar insulin 74 units at bedtime, and Humalog insulin 20 units with meals. His blood sugars are really excellent and consistent with the A1c. He does complain of some swelling in his lower extremities but otherwise is without complaint. Examination  Blood pressure 134/80  Pulse 82  Weight 325    Impression type 2 diabetes mellitus morbid obesity but improved A1c on combination therapy. Plan:  1. We continue the Ozempic/Basaglar/Humalog but decrease the dose of Humalog to 15 units  2. I did write a prescription for Lasix to 20 mg to be taken as needed for peripheral edema. However we encouraged the use of compression stockings to reduce the edema without pharmacologic therapy. We will see him back in 3 months or sooner as needed. We spent more than 25 mintutes face to face, more than 50% was in counseling regarding diet, exercise and carbohydrate intake.

## 2019-12-11 ENCOUNTER — OFFICE VISIT (OUTPATIENT)
Dept: ENDOCRINOLOGY | Age: 59
End: 2019-12-11

## 2019-12-11 VITALS
WEIGHT: 315 LBS | DIASTOLIC BLOOD PRESSURE: 76 MMHG | HEIGHT: 72 IN | SYSTOLIC BLOOD PRESSURE: 156 MMHG | BODY MASS INDEX: 42.66 KG/M2 | HEART RATE: 89 BPM

## 2019-12-11 DIAGNOSIS — E11.29 TYPE 2 DIABETES MELLITUS WITH MICROALBUMINURIA, WITH LONG-TERM CURRENT USE OF INSULIN (HCC): Primary | ICD-10-CM

## 2019-12-11 DIAGNOSIS — Z79.4 TYPE 2 DIABETES MELLITUS WITH MICROALBUMINURIA, WITH LONG-TERM CURRENT USE OF INSULIN (HCC): Primary | ICD-10-CM

## 2019-12-11 DIAGNOSIS — R80.9 TYPE 2 DIABETES MELLITUS WITH MICROALBUMINURIA, WITH LONG-TERM CURRENT USE OF INSULIN (HCC): Primary | ICD-10-CM

## 2019-12-11 LAB — HBA1C MFR BLD HPLC: 6.7 %

## 2019-12-11 NOTE — PROGRESS NOTES
Mr. Modesto Ansari returns for follow-up of his type 2 diabetes mellitus with morbid obesity and an A1c in April of 7.9%. [facility] has been on metformin thousand milligrams twice daily, Basaglar 74 units daily, Humalog 20 units with meals, and glipizide 10 mg twice daily.  We added Tereza [place] was started on 0.25 mg and then we and then increased to 0.5 mg.  He did not tolerate the 0.5 mg dose and so he is back to doing 0.25 mg weekly for the most part he is tolerating that. At our last visit his A1c was down to 6.1%. His A1c today is 6.7%. So his current medical regimen is Ozempic 0.25 mg weekly, Basaglar insulin 74 units at bedtime, and Humalog insulin 20 units with meals. His blood sugars are really excellent and consistent with the A1c. He does complain of some swelling in his lower extremities but otherwise is without complaint. Since I saw him last, he developed an episode of Bell's palsy in October and required glucocorticoids for about 5 to 10 days. His blood sugar did increase significantly while on the steroids but they have come back down. His blood sugars for the last month the primarily ranging between 80 and 140 in the morning. He will occasionally see a higher reading but he understands what he is done to do that. He is not having any hypoglycemia. Examination  Blood pressure 156/76  Pulse 89  Weight 318 (down 7 pounds)    Impression type 2 diabetes mellitus on combination therapy with excellent A1c's with a slight exacerbation due to glucocorticoids for Bell's palsy  Plan: No change in the regimen were made. I expect that the next A1c will be better in the absence of steroids. We will see him back in 6 months or sooner. We spent more than 25 mintutes face to face, more than 50% was in counseling regarding diet, exercise and carbohydrate intake.

## 2020-04-20 RX ORDER — INSULIN LISPRO 100 [IU]/ML
INJECTION, SOLUTION INTRAVENOUS; SUBCUTANEOUS
Qty: 20 ADJUSTABLE DOSE PRE-FILLED PEN SYRINGE | Refills: 1 | Status: SHIPPED | OUTPATIENT
Start: 2020-04-20 | End: 2020-09-23 | Stop reason: SDUPTHER

## 2020-04-20 NOTE — TELEPHONE ENCOUNTER
Requested Prescriptions     Pending Prescriptions Disp Refills    insulin lispro (HumaLOG KwikPen Insulin) 100 unit/mL kwikpen 20 Adjustable Dose Pre-filled Pen Syringe 1     Sig: INJECT 20 UNITS SUBCUTANEOUSLY 3 TIMES A DAY WITH MEALS

## 2020-06-05 ENCOUNTER — TELEPHONE (OUTPATIENT)
Dept: ENDOCRINOLOGY | Age: 60
End: 2020-06-05

## 2020-06-05 DIAGNOSIS — E11.29 TYPE 2 DIABETES MELLITUS WITH MICROALBUMINURIA, WITH LONG-TERM CURRENT USE OF INSULIN (HCC): Primary | ICD-10-CM

## 2020-06-05 DIAGNOSIS — Z79.4 TYPE 2 DIABETES MELLITUS WITH MICROALBUMINURIA, WITH LONG-TERM CURRENT USE OF INSULIN (HCC): Primary | ICD-10-CM

## 2020-06-05 DIAGNOSIS — R80.9 TYPE 2 DIABETES MELLITUS WITH MICROALBUMINURIA, WITH LONG-TERM CURRENT USE OF INSULIN (HCC): Primary | ICD-10-CM

## 2020-06-05 NOTE — TELEPHONE ENCOUNTER
----- Message from Yoav Silverio sent at 6/3/2020  4:48 PM EDT -----  Regarding: Dr Sabiha Ledbetter Message/Vendor Calls    Caller's first and last name:      Reason for call:needs to move his appt 6/8/2020 at 8:30am  to another day in the afternoon due to his work schedule       Callback required yes/no and why:yes for reason given above      Best contact number(s):[phone]      Details to clarify the request:      Yoav Silverio

## 2020-06-09 NOTE — TELEPHONE ENCOUNTER
----- Message from Aleena Cline sent at 6/8/2020  4:56 PM EDT -----  Regarding: Dr. Elizabeth Webber  Patient return call    Caller's first and last name and relationship (if not the patient):  pt    Best contact number(s):  [phone]    Whose call is being returned:   The office    Details to clarify the request:      Aleena Cline

## 2020-06-09 NOTE — TELEPHONE ENCOUNTER
----- Message from Katlin Huynh sent at 6/8/2020  4:56 PM EDT -----  Regarding: Dr. Mare Bowers  Patient return call    Caller's first and last name and relationship (if not the patient):  pt    Best contact number(s):  [phone]    Whose call is being returned:   The office    Details to clarify the request:      Katlin Huynh

## 2020-06-10 DIAGNOSIS — R80.9 TYPE 2 DIABETES MELLITUS WITH MICROALBUMINURIA, WITH LONG-TERM CURRENT USE OF INSULIN (HCC): ICD-10-CM

## 2020-06-10 DIAGNOSIS — E11.29 TYPE 2 DIABETES MELLITUS WITH MICROALBUMINURIA, WITH LONG-TERM CURRENT USE OF INSULIN (HCC): ICD-10-CM

## 2020-06-10 DIAGNOSIS — Z79.4 TYPE 2 DIABETES MELLITUS WITH MICROALBUMINURIA, WITH LONG-TERM CURRENT USE OF INSULIN (HCC): ICD-10-CM

## 2020-06-10 RX ORDER — SEMAGLUTIDE 1.34 MG/ML
0.25 INJECTION, SOLUTION SUBCUTANEOUS
Qty: 1 BOX | Refills: 2 | Status: SHIPPED | OUTPATIENT
Start: 2020-06-10 | End: 2020-06-25 | Stop reason: SDUPTHER

## 2020-06-10 NOTE — TELEPHONE ENCOUNTER
Requested Prescriptions     Pending Prescriptions Disp Refills    semaglutide (Ozempic) 0.25 mg/0.2 mL (2 mg/1.5 mL) sub-q pen 1 Box      Si.25 mg by SubCUTAneous route every seven (7) days.

## 2020-06-12 LAB
EST. AVERAGE GLUCOSE BLD GHB EST-MCNC: 123 MG/DL
HBA1C MFR BLD: 5.9 % (ref 4.8–5.6)

## 2020-06-17 ENCOUNTER — VIRTUAL VISIT (OUTPATIENT)
Dept: ENDOCRINOLOGY | Age: 60
End: 2020-06-17

## 2020-06-17 DIAGNOSIS — Z79.4 TYPE 2 DIABETES MELLITUS WITH MICROALBUMINURIA, WITH LONG-TERM CURRENT USE OF INSULIN (HCC): Primary | ICD-10-CM

## 2020-06-17 DIAGNOSIS — R80.9 TYPE 2 DIABETES MELLITUS WITH MICROALBUMINURIA, WITH LONG-TERM CURRENT USE OF INSULIN (HCC): Primary | ICD-10-CM

## 2020-06-17 DIAGNOSIS — E11.29 TYPE 2 DIABETES MELLITUS WITH MICROALBUMINURIA, WITH LONG-TERM CURRENT USE OF INSULIN (HCC): Primary | ICD-10-CM

## 2020-06-17 DIAGNOSIS — E66.01 MORBID OBESITY (HCC): ICD-10-CM

## 2020-06-17 NOTE — PROGRESS NOTES
This is an established visit conducted via telemedicine using doxy. me vifdeo  The patient has been instructed that this meets HIPAA criteria ,that they may receive a bill for these services and acknowledges and agrees to this method of visitation. Mr. Jona Davis returns for follow-up of his type 2 diabetes mellitus with morbid obesity and an A1c in April of 7.9%. [facility] has been on metformin thousand milligrams twice daily, Basaglar 74 units daily, Humalog 20 units with meals, and glipizide 10 mg twice daily.  We added Ella Bars was started on 0.25 mg and then we and then increased to 0.5 mg.  He did not tolerate the 0.5 mg dose and so he is back to doing 0.25 mg weekly for the most part he is tolerating that. At our last visit his A1c was down to 6.1%. His A1c today is 6.7%. So his current medical regimen is Ozempic 0.25 mg weekly, Basaglar insulin 74 units at bedtime, and Humalog insulin 15 units with meals.  His blood sugars are really excellent and consistent with the A1c.  He does complain of some swelling in his lower extremities but otherwise is without complaint. He did submit to me a log of his blood sugars since February. He read all of his fasting blood sugars for the last 2 weeks and they are ranging from . He had a few days where his blood sugar was in the 250 range but he knows that was because he worked late and had Ramen noodles at 2 in the morning and did not take insulin for that. Otherwise he is doing very well. As noted above, his Humalog has dropped from 20 units down to 15 units because of the effectiveness of the Ozempic. Impression type 2 diabetes mellitus and morbid obesity with improving glucose control on combination therapy. Plan: I will send an A1c today. Please note the A1c that was reported as 5.9% in the computer is not his blood. We are working to get that taken off the reading. We will see him back in 4 to 6 months face-to-face.

## 2020-06-25 DIAGNOSIS — Z79.4 TYPE 2 DIABETES MELLITUS WITH MICROALBUMINURIA, WITH LONG-TERM CURRENT USE OF INSULIN (HCC): ICD-10-CM

## 2020-06-25 DIAGNOSIS — R80.9 TYPE 2 DIABETES MELLITUS WITH MICROALBUMINURIA, WITH LONG-TERM CURRENT USE OF INSULIN (HCC): ICD-10-CM

## 2020-06-25 DIAGNOSIS — E11.29 TYPE 2 DIABETES MELLITUS WITH MICROALBUMINURIA, WITH LONG-TERM CURRENT USE OF INSULIN (HCC): ICD-10-CM

## 2020-06-25 RX ORDER — SEMAGLUTIDE 1.34 MG/ML
0.25 INJECTION, SOLUTION SUBCUTANEOUS
Qty: 1 PEN | Refills: 2 | Status: SHIPPED | OUTPATIENT
Start: 2020-06-25

## 2020-08-21 DIAGNOSIS — R80.9 TYPE 2 DIABETES MELLITUS WITH MICROALBUMINURIA, WITH LONG-TERM CURRENT USE OF INSULIN (HCC): ICD-10-CM

## 2020-08-21 DIAGNOSIS — E11.29 TYPE 2 DIABETES MELLITUS WITH MICROALBUMINURIA, WITH LONG-TERM CURRENT USE OF INSULIN (HCC): ICD-10-CM

## 2020-08-21 DIAGNOSIS — Z79.4 TYPE 2 DIABETES MELLITUS WITH MICROALBUMINURIA, WITH LONG-TERM CURRENT USE OF INSULIN (HCC): ICD-10-CM

## 2020-08-21 RX ORDER — FUROSEMIDE 20 MG/1
20 TABLET ORAL DAILY
Qty: 90 TAB | Refills: 3 | Status: SHIPPED | OUTPATIENT
Start: 2020-08-21 | End: 2021-07-23

## 2020-08-21 NOTE — TELEPHONE ENCOUNTER
Requested Prescriptions     Pending Prescriptions Disp Refills    furosemide (LASIX) 20 mg tablet 90 Tab 3     Sig: Take 1 Tab by mouth daily.  Indications: visible water retention

## 2020-09-14 ENCOUNTER — TELEPHONE (OUTPATIENT)
Dept: ENDOCRINOLOGY | Age: 60
End: 2020-09-14

## 2020-09-14 NOTE — TELEPHONE ENCOUNTER
Ozempic 0.25 mg weekly, Basaglar insulin 74 units at bedtime, and Humalog insulin 15 units with meals. Glucose readings from June until September are excellent ranging between 90 and 150 with occasional excursions above and below but otherwise outstanding.

## 2020-09-23 RX ORDER — INSULIN LISPRO 100 [IU]/ML
INJECTION, SOLUTION INTRAVENOUS; SUBCUTANEOUS
Qty: 20 ADJUSTABLE DOSE PRE-FILLED PEN SYRINGE | Refills: 3 | Status: SHIPPED | OUTPATIENT
Start: 2020-09-23

## 2020-09-23 NOTE — TELEPHONE ENCOUNTER
Requested Prescriptions     Pending Prescriptions Disp Refills    insulin lispro (HumaLOG KwikPen Insulin) 100 unit/mL kwikpen 20 Adjustable Dose Pre-filled Pen Syringe 3     Sig: INJECT 20 UNITS SUBCUTANEOUSLY 3 TIMES A DAY WITH MEALS

## 2021-02-19 DIAGNOSIS — Z79.4 TYPE 2 DIABETES MELLITUS WITH MICROALBUMINURIA, WITH LONG-TERM CURRENT USE OF INSULIN (HCC): ICD-10-CM

## 2021-02-19 DIAGNOSIS — E11.29 TYPE 2 DIABETES MELLITUS WITH MICROALBUMINURIA, WITH LONG-TERM CURRENT USE OF INSULIN (HCC): ICD-10-CM

## 2021-02-19 DIAGNOSIS — R80.9 TYPE 2 DIABETES MELLITUS WITH MICROALBUMINURIA, WITH LONG-TERM CURRENT USE OF INSULIN (HCC): ICD-10-CM

## 2021-02-19 RX ORDER — SEMAGLUTIDE 1.34 MG/ML
0.25 INJECTION, SOLUTION SUBCUTANEOUS
Qty: 1 PEN | Refills: 3 | Status: SHIPPED | OUTPATIENT
Start: 2021-02-19

## 2021-02-19 NOTE — TELEPHONE ENCOUNTER
Requested Prescriptions     Pending Prescriptions Disp Refills    semaglutide (Ozempic) 0.25 mg/0.2 mL (2 mg/1.5 mL) sub-q pen       Si.25 mg by SubCUTAneous route every seven (7) days.

## 2021-03-10 ENCOUNTER — APPOINTMENT (OUTPATIENT)
Dept: PHYSICAL THERAPY | Age: 61
End: 2021-03-10

## 2021-03-31 ENCOUNTER — HOSPITAL ENCOUNTER (OUTPATIENT)
Dept: PHYSICAL THERAPY | Age: 61
Discharge: HOME | End: 2021-03-31
Payer: COMMERCIAL

## 2021-03-31 VITALS — WEIGHT: 315 LBS | BODY MASS INDEX: 42.66 KG/M2 | HEIGHT: 72 IN

## 2021-03-31 PROCEDURE — 97110 THERAPEUTIC EXERCISES: CPT

## 2021-03-31 PROCEDURE — 97140 MANUAL THERAPY 1/> REGIONS: CPT

## 2021-03-31 PROCEDURE — 97161 PT EVAL LOW COMPLEX 20 MIN: CPT

## 2021-03-31 NOTE — PROGRESS NOTES
[facility]  Lymphedema Clinic  [address] [place], [address], [address]    INITIAL EVALUATION    NAME: Monica Boucher AGE: 63 y.o. GENDER: male  DATE: 3/31/2021  REFERRING PHYSICIAN: Bryan Floyd MD  HISTORY AND BACKGROUND:   Primary Diagnosis:  · Lymphedema, not elsewhere classified (B LE's) (I89.0)  Other Treatment Diagnoses:   Abnormality of gait (R26.9)   Swelling not relieved by elevation (R60.9)   Morbid obesity (E66.01)   Diabetic condition (E11.69)  Date of Onset: 1/22/2021 - referral to the clinic  Present Symptoms and Functional Limitations: Patient reports that swelling in the legs began in August and he is unaware of any trigger for the swelling. He was referred to Vascular Surgery and had a L iliac vein stent 1/2021 but swelling did not improve. Patient self purchased a pair of knee high stockings which were too tight causing a small ulcer at the crease of his ankle. The wound has finally healed but he continues to have difficulty finding knee highs that fit well. The swelling in his legs and feet make it difficult to stand for long periods of time and will occasionally cause him to lose his balance. Lymphedema Life Impact Scale: Patient scores a 26 with 38% impairment  Past Medical History: No past medical history on file. No past surgical history on file. Per patient: DM type 2 and HTN. Current Medications:    Current Outpatient Medications   Medication Sig    semaglutide (Ozempic) 0.25 mg/0.2 mL (2 mg/1.5 mL) sub-q pen 0.25 mg by SubCUTAneous route every seven (7) days.  insulin lispro (HumaLOG KwikPen Insulin) 100 unit/mL kwikpen INJECT 20 UNITS SUBCUTANEOUSLY 3 TIMES A DAY WITH MEALS    furosemide (LASIX) 20 mg tablet Take 1 Tab by mouth daily. Indications: visible water retention    semaglutide (Ozempic) 0.25 mg/0.2 mL (2 mg/1.5 mL) sub-q pen 0.25 mg by SubCUTAneous route every seven (7) days.     OneTouch Ultra Blue Test Strip strip CHECK BLOOD SUGAR ONCE DAILY    amLODIPine (NORVASC) 10 mg tablet Take 10 mg by mouth daily.  atenolol (TENORMIN) 100 mg tablet Take 100 mg by mouth daily.  enalapril (VASOTEC) 20 mg tablet Take 20 mg by mouth daily.  glipiZIDE SR (GLUCOTROL XL) 10 mg CR tablet Take 10 mg by mouth two (2) times a day.  hydroCHLOROthiazide (HYDRODIURIL) 25 mg tablet take 1 tablet by mouth once daily    BASAGLSTEPHEN FERRARAPEN U-100 INSULIN 100 unit/mL (3 mL) inpn 74 Units by SubCUTAneous route daily.  metFORMIN (GLUCOPHAGE) 1,000 mg tablet 1,000 mg two (2) times a day.  pravastatin (PRAVACHOL) 80 mg tablet Take 80 mg by mouth nightly. No current facility-administered medications for this encounter. 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 Living Situation: Lives with his girlfriend in a one story house. Trainable Caregiver?: Yes  Mobility: Independent gait without any assistive device for community distances  Sleeping Arrangement:  in bed at night  Adaptive Equipment Owned: none  Other: Patient is able to don compression stockings and does have assistance if needed. Previous Therapy:  None  Compression/Lymphedema Equipment: self purchased knee high stockings of unknown brand or compression    SUBJECTIVE:   Patient reports having swelling in the legs since August. He purchased knee high stockings which caused a wound at the ankle. His girlfriend assisted with wound care using Neosporin ointment and the wound finally healed. Patient's goals for therapy: decrease swelling.    OBJECTIVE DATA SUMMARY:   EXAMINATION/PRESENTATION/DECISION MAKING:   Pain:   Patient reports 0/10 pre and post evaluation. Self-Care and ADLs: Modified independent with bathing and dressing. Skin and Tissue Assessment:  Dermal Status: Tenuous, Dry and Flaky lower legs and heels    Texture/Consistency: Fibrotic/Woody lower legs, boggy/brawny upper legs bilaterally    Pigmentation/Color Change: Hyperpigmented    Anomalies: Papillomas lower legs bilaterally    Circulatory: Vascular studies ruled out DVT in past, pulses difficult to palpate due to swelling in the feet. Nails: Fungus    Stemmers Sign: Positive    Height:  Height: 6' (182.9 cm)  Weight:  Weight: 149.8 kg (330 lb 3.2 oz)   BMI:  BMI (calculated): 44.8  (36 or greater: adversely affecting lymphedema)  Wound/Ulcer:  Recently healed wound at the crease of the L ankle anteriorly. Volumetric Measurements:   Right:  13,994.28 mL Left: 15,068. 13 mL   % Difference: 8% L LE > R LE Dominance: R   Range of Motion: generally decreased but functional   Strength: Not formally assessed 2* patient is able to complete all functional activities in the clinic today. Sensation:  Intact     Mobility:    Bed/Chair Mobility: Modified independent  Transfers: Modified independent  Gait: Independent  Endurance: Good - patient is sedentary    Safety:  Patient is alert and oriented: Yes  Safety awareness: Appears intact  Fall risk?: Yes  Patient given written fall prevention handout: Yes    Based on the above components, the patient evaluation is determined to be of the following complexity level: LOW     Evaluation Time: 2:00-2:25 pm    TREATMENT PROVIDED:   1. Treatment description:  Therapeutic Exercise and Procedure: Patient instructed in activity restrictions and exercise guidelines. Therapist demonstrated deep abdominal breathing and a remedial lymphedema range of motion exercise program to be done in sitting. Patient was able to verbalize and demonstrate the routine and deep abdominal breathing with fair performance.   Patient has been asked to complete breathing exercises and the decongestive exercise routine daily. Provided patient with written HEP to follow. Patient instructed to participate in walking program up to 25 mins per day as tolerated. Patient instructed to avoid sitting for long periods at one time. Treatment time:  3:10 - 3:25 pm   Minutes: 15    2. Treatment description:  Manual Therapy: Patient instructed in skin care principles and anatomy of the lymphatic system. Therapist able to demonstrate manual lymphatic drainage techniques for the drainage of LE's and skin care protocol:   Patient was educated in daily skin care with low PH lotion and manual lymph drainage techniques were demonstrated during skin care in the clinic today: washed the lower legs/feet with Dove Soap and applied Aquaphor/Remedy lotion to the lower legs and feet. Patient was educated in the prevention and treatment of skin injuries and signs and symptoms of infection. Discussed standard lymphedema precautions to include avoiding blood pressure readings, injections and IVs or other procedures/acts that could lead to broken skin on affected area, and avoiding excessive heat, resistive activity or altitude without compression garment. Patient was instructed to lie in bed for 15 minute intervals throughout the day to limit the effect of gravity on swelling. Discussed the role and benefit of multi-layer compression bandaging and the cost of bandaging supplies but bandaging is not an option at this time due to the time commitment, cost, and the 2 hour drive to and from our clinic. Provided patient with samples of compression garments, Circular knit, Flat-knit, Velcro and Foam based, and patient voiced interest in obtaining garments in a future visit. Will attempt to secure a vendor and check insurance coverage for garments prior to patient's next visit. Discussed the role and benefit of the vaso-pneumatic device and patient voiced interest in having a pump trial in a future visit.  Will contact the vendor, [employer], to check insurance benefits. Treatment time:  2:25-3:10 pm  Minutes: 39  ASSESSMENT:   Robert Butt is a 63 y.o. male who presents with L LE > R LE secondary lymphedema complicated by morbid obesity, skin changes with history of wound, and recent vascular surgery. Full LE volumetric measurements taken today reveal a percentage difference of 8% L LE > R LE. Patient would benefit from multi-layer compression bandaging for reduction of swelling in the legs but MLB is not feasible at this time. Swelling in the legs has not been managed well in the past with ready made garments. Patient is now interested in exploring other compression options for more effective management of swelling in order to reduce the risk of wounds and infections. This care is medically necessary due to the infection risk with lymphedema and to improve functional activities. CDT is necessary to resolve swelling to allow patient to return to wearing normal clothes/footwear and prevent worsening of symptoms, such as infections and/or hospitalizations. Patient will be independent with home program strategies to allow improved ADL ability and mobility and to allow patient to return to greatest functional independence. Rehabilitation potential is considered to be Fair. Factors which may influence rehabilitation potential include 2 hour drive to and from the clinic, sedentary lifestyle, obesity, and skin changes. Patient will benefit from 3 to 6 physical therapy visits over 12 weeks to optimize improvement in these areas. PLAN OF CARE:   Recommendations and Planned Interventions: Manual lymph drainage/decongestive therapy, Multi-layer compression bandaging (short-stretch), Compression garment fitting/provision, Lymphedema therapeutic exercise and Education in skin care and lymphedema precautions, caregiver education    GOALS  Short term goals  Time frame: to be met by 4/5/2021  1.   Patient will demonstrate knowledge of signs/symptoms of infections/cellulitis and be independent in skin care to prevent cellulitis. 2.  Patient will demonstrate independence in lymphedema home program of therapeutic exercises to improve circulation and decongest limb to improve ADLs. 3.  Patient will tolerate multi-layer bandages (MLB) and show measureable decrease in limb volume and/or participate in the selection process to allow ordering of home compression system (daytime, nighttime garments and pump as needed). Long term goals  Time frame: to be met by 6/26/2021  1. Patient will be independent with don/doff of compression system and use in order to prevent reaccumulation of fluid at discharge. 2.  Pt will be independent in self-MLD and show stable limb volumes showing decongestion and pt. ready for transition to independent restorative phase of lymphedema therapy. Patient has participated in goal setting and agrees to work toward plan of care. Patient was instructed to call if questions or concerns arise. Thank you for this referral.  Zandra Magaña, PT, CLT   Time Calculation: 85 mins    TREATMENT PLAN EFFECTIVE DATES:   3/31/2021 TO 6/26/2021  I have read the above plan of care for Manfred Bella. I certify the above prescribed services are required by this patient and are medically necessary. The above plan of care has been developed in conjunction with the lymphedema/physical therapist.       Physician Signature: ____________________________________Date:______________     Josie Quintana.  Madai Maya MD

## 2021-04-30 ENCOUNTER — HOSPITAL ENCOUNTER (OUTPATIENT)
Dept: PHYSICAL THERAPY | Age: 61
Discharge: HOME | End: 2021-04-30
Payer: COMMERCIAL

## 2021-04-30 PROCEDURE — 97140 MANUAL THERAPY 1/> REGIONS: CPT

## 2021-04-30 PROCEDURE — 97016 VASOPNEUMATIC DEVICE THERAPY: CPT

## 2021-04-30 NOTE — PROGRESS NOTES
LYMPHEDEMA PT DAILY TREATMENT NOTE - [facility] 2-15    Patient Name: Aileen Kay  Date:2021  : 1960  [x]  Patient  Verified  Payor: BLUE CROSS / Plan: Regis Olmos [id number] PPO / Product Type: PPO /    In time:1:15 pm Out time: 3:00 pm  Total Treatment Time (min): 105  Total Timed Codes (min): 105  Visit #:  2    Treatment Area: Lymphedema, not elsewhere classified [I89.0]    SUBJECTIVE  Pain Level (0-10 scale): 0/10  Any medication changes, allergies to medications, adverse drug reactions, diagnosis change, or new procedure performed?: [x] No    [] Yes (see summary sheet for update)  Subjective functional status/changes:    Patient reports noticing an increase in swelling in the legs after standing on a ladder painting the house. He banged his L leg on the ladder and now has a small cut on the leg. OBJECTIVE    5 min Therapeutic Exercise:  [] Danya Rule Exercises [x] Remedial Lymphedema Exercise Program - continued education in performing a routine daily. Discussed initiating a walking program up to 25 minutes per day. Discussed the role and benefit of compression garments with exercise. Patient instructed to avoid sitting long periods at one time at work by marching in place or walking every 30 to 45 minutes as tolerated. [] Axillary Web Exercise Program      [] Shoulder ROM Exercises   Rationale: Activate muscle pump to improve lymphatic fluid movement and decrease swelling to improve the patients ability to perform ADL and IADL skills and prevent worsening of swelling over time. 60 min Manual Therapy    Manual Lymphatic Drainage (MLD):  Area to decongest: LE's    Sequence used and effectiveness: Deferred due to pump trial.    Skin/wound care/debridement: Reviewed skin care principles:   Continued education in daily skin care with low pH lotion following manual lymph principles. Prevention of cellulitis - continued education in signs and symptoms.    Patient with small superficial wound on anterior L leg. No drainage or odor noted. Applied A and D ointment and covered with bandage. Applied multi-layer compression bandaging to: Discussed the role and benefit of multi-layer compression bandaging and the cost of bandaging supplies on evaluation but bandaging is not an option at this time due to the time commitment, cost, and the 2 hour drive to and from our clinic. Upper/Lower Extremity Compression: Measured for the following compression products for the LE's:    Style: Velcro    Brand: Circaid Juxta Fit Premium lower leg pieces size: X large full calf and size XX large, Farrow Wrap Classic Foot pieces size medium/long and size large/long, and two pairs of CirAid compressive undersocks 15-25 mmHg in the size large. Type: Non-Custom    Vendor: [employer]    Education: Educated patient in compression garment donning and doffing. Daily wear schedule. Daily laundering. Garment lifespan. Return and reordering process (by bringing prior garments into the clinic). Educated patient to monitor for redness or pressure points on the skin. If new pain occurs they should contact their therapist.    Patient/family able to verbalize understanding of education. Rationale: increase ROM, increase tissue extensibility and decrease edema  to improve the patients ability to perform ADL's and mobilize with ease. 40   Vasopneumatic Device:   Patient able to have a trial of the Flexitouch vaso-pneumatic pump during the visit today. Patient positioned in supine with L LE elevated on one pillow. Patient tolerated the pump trial performed by the rep from [facility] for approximately 40 minutes without complaints.  Pre and post pump measurements were taken:    L ankle: pre pump 35 cm, post pump 34.2 cm  L calf: pre pump 54.7 cm, post pump 51.5 cm  L knee: pre pump 48.4 cm, post pump 48.3 cm  L thigh: pre pump 60.2 cm, post pump 60.2 cm  Waist: pre pump 150 cm, post pump 150 cm Rationale: To improve lymphatic fluid movement and decrease swelling to improve the patients ability to perform ADL and IADL skills and prevent worsening of swelling over time. With   [] TE   [] TA   [x] MT   [] SC   [x] other: Patient Education: [x] Review HEP    [x] MLD Patient Education Continued education in self MLD technique with bathing and skin care. [] Progressed/Changed HEP based on:   [] positioning   [] Kinesiotape   [x] Skin care   [x] wound care   [x] other:  Role and benefit of compression garments and the pump  Compression bandaging/garment precautions reviewed: [x] Yes  [] No     Other Objective/Functional Measures: Girth measurements were taken today for sizing of velcro products. R ankle: 31.5 cm  R mid calf: 50.0 cm  R upper calf: 51.0 cm    L ankle: 35 cm  L mid calf:  52 cm  L upper calf:  55 cm    Pain Level (0-10 scale) post treatment: 0/10      ASSESSMENT/Changes in Function:   Patient is returning to the clinic for the first visit since evaluation 3/31/2021. Attending visits in our clinic is difficult for patient due to work commitments and the 2 hour drive to and from the clinic. Patient was interested in having a trial of the vaso-pneumatic pump and that was completed during the visit today. There was visible improvement as well as a decrease in girth measurements of the L calf and ankle with use of the pump. Patient would benefit from having a pump at home for management of swelling during the restorative phase of care. Since bandaging is not an option for patient at this time, he was measured for velcro products for the lower legs and feet. Patient would benefit from the use of adjustable compression products for management of swelling as he remains at risk for infection and continual skin changes in the legs. He is aware that the garments may take several weeks to process since they need to be authorized by insurance.         Patient will continue to benefit from skilled PT services to  address swelling, analyze and address soft tissue restrictions, analyze compression product fit and use, instruct in home lymphedema management program and modify and progress therapeutic interventions to attain remaining goals. [x]  See Plan of Care  []  See progress note/recertification  []  See Discharge Summary         Progress towards goals / Updated goals:  Short term goals  Time frame: to be met by 4/5/2021, will extend all short term goals to be met by 6/26/2021  1. Patient will demonstrate knowledge of signs/symptoms of infections/cellulitis and be independent in skin care to prevent cellulitis. Goal in progress. 2.  Patient will demonstrate independence in lymphedema home program of therapeutic exercises to improve circulation and decongest limb to improve ADLs. Goal in progress. 3.  Patient will tolerate multi-layer bandages (MLB) and show measureable decrease in limb volume and/or participate in the selection process to allow ordering of home compression system (daytime, nighttime garments and pump as needed). Goal in progress.      Long term goals  Time frame: to be met by 6/26/2021  1. Patient will be independent with don/doff of compression system and use in order to prevent reaccumulation of fluid at discharge. 2.  Pt will be independent in self-MLD and show stable limb volumes showing decongestion and pt. ready for transition to independent restorative phase of lymphedema therapy. Goal in progress.      PLAN  [x]  Upgrade activities as tolerated     [x]  Continue plan of care  []  Update interventions per flow sheet       []  Discharge due to:_  []  Other:_      Shawna Edwards, PT, CLT 4/30/2021

## 2021-06-01 RX ORDER — INSULIN LISPRO 100 [IU]/ML
INJECTION, SOLUTION INTRAVENOUS; SUBCUTANEOUS
Qty: 20 ADJUSTABLE DOSE PRE-FILLED PEN SYRINGE | Refills: 3 | OUTPATIENT
Start: 2021-06-01

## 2021-06-09 ENCOUNTER — HOSPITAL ENCOUNTER (OUTPATIENT)
Dept: PHYSICAL THERAPY | Age: 61
Discharge: HOME | End: 2021-06-09
Payer: COMMERCIAL

## 2021-06-09 PROCEDURE — 97140 MANUAL THERAPY 1/> REGIONS: CPT

## 2021-06-09 NOTE — PROGRESS NOTES
LYMPHEDEMA 90 DAY PROGRESS NOTE WITH UPDATED PLAN OF CARE - [facility] 2-15    Patient Name: Ryan Read  Date:2021  : 1960  [x]  Patient  Verified  Payor: BLUE CROSS / Plan: [facility] PPO / Product Type: PPO /    In time: 1:10 pm Out time: 2:25 pm  Total Treatment Time (min): 75  Total Timed Codes (min): 75  Visit #:  3    Treatment Area: Lymphedema, not elsewhere classified [I89.0]    SUBJECTIVE  Pain Level (0-10 scale): 0/10  Any medication changes, allergies to medications, adverse drug reactions, diagnosis change, or new procedure performed?: [x] No    [] Yes (see summary sheet for update)  Subjective functional status/changes:      Patient arrives to the visit today with his girlfriend. He reports noticing a wound on the bottom of his L foot approximately one week ago. The wound is not painful but has drainage. Patient reports that his girlfriend is cleaning the wound with Neosporin prior to applying a bandage. OBJECTIVE    0 min Therapeutic Exercise:  [] Lucillie Do Exercises [x] Remedial Lymphedema Exercise Program - continued education in performing the Active ROM routine daily. Patient is making an effort to avoid sitting for long periods at one time at work. Attempting to ambulate every 30 to 45 minutes. [] Axillary Web Exercise Program      [] Shoulder ROM Exercises   Rationale: Activate muscle pump to improve lymphatic fluid movement and decrease swelling to improve the patients ability to perform ADL and IADL skills and prevent worsening of swelling over time. 75 min Manual Therapy    Manual Lymphatic Drainage (MLD):  Area to decongest: LE's    Sequence used and effectiveness: Deferred   Skin/wound care/debridement: Reviewed skin care principles:   Patient arrives to the clinic with wound on the plantar surface of the L foot. Patient's girlfriend is cleaning the wound with Neosporin and applying a bandage.  Cautioned patient regarding the use of Neosporin on the wound and discussed the importance of keeping the wound covered. The wound appears to tunnel and is dry in appearance but there is drainage present on the bandage that was removed. No odor present. Wound cleaned and covered with 4X4 and paper tape prior to leaving the clinic. Prevention of cellulitis - continued education in signs and symptoms. _       Upper/Lower Extremity Compression: Patient received the following compression products and brought them to the clinic for fitting today. Style: Velcro    Brand: Circaid Juxta Fit Premium lower leg pieces size: X large full calf for the R LE and size XX large for the L LE, Farrow Wrap Classic Foot pieces size medium/long for the R LE and size large/long for the L LE, and two pairs of CirAid compressive undersocks 15-25 mmHg in the size large. Type: Non-Custom    Vendor: [employer]     The garments fit well and were comfortable to the patient. Patient was able to demonstrate the ability to don and doff the garments with verbal cues to avoid gaps and donning the garments too tight. Recommended that patient schedule an appointment with his PCP as soon as possible for assessment of the wound on the L foot. Discussed that patient should avoid use of compression products until wound is assessed and use of products is cleared by PCP. Education: Educated patient in compression garment donning and doffing. Daily wear schedule once cleared by physician. Daily laundering. Garment lifespan. Return and reordering process (by bringing prior garments into the clinic). Educated patient to monitor for redness or pressure points on the skin. If new pain occurs they should contact their therapist.    Patient/family able to verbalize understanding of education. Rationale: increase ROM, increase tissue extensibility and decrease edema  to improve the patients ability to perform ADL's and mobilize with ease.         Vasopneumatic Device:   Patient received the Flexitouch vaso-pneumatic device for home use and has used the pump two nights. Patient reports having difficulty getting the garments to fit comfortably. Recommended that patient contact the rep from [facility] to schedule a home visit for pump training and fitting of the garments. Patient will defer use of the pump and the home training session until cleared by physician for pump use. Rationale: To improve lymphatic fluid movement and decrease swelling to improve the patients ability to perform ADL and IADL skills and prevent worsening of swelling over time. With   [] TE   [] TA   [x] MT   [] SC   [] other: Patient Education: [x] Review HEP    [] MLD Patient Education   [] Progressed/Changed HEP based on:   [] positioning   [] Kinesiotape   [x] Skin care   [x] wound care   [x] other:  Fitting of compression products, risk of infection, instructed patient to follow up with PCP and patient plans to call to make an appointment today. Compression bandaging/garment precautions reviewed: [x] Yes -patient to avoid use of compression products and the vaso-pneumatic device until wound is assessed and patient is cleared by MD.      Other Objective/Functional Measures: Girth measurements taken today. R ankle: 32.5 cm  R mid calf: 51.5 cm  R upper calf: 53.8. cm    L ankle: 36 cm  L mid calf:  54 cm  L upper calf:  57 cm    Pain Level (0-10 scale) post treatment: 0/10      ASSESSMENT/Changes in Function:   Patient was fitted with the compression products for the lower legs and feet today. The garments fit well and are comfortable to the patient. Patient has received the vaso-pneumatic device for home use but has used it sporadically. Patient was instructed to avoid using all compression products and the vaso-pneumatic device at this time due to the presence of a new wound on the L foot.  Discussed the importance of following up with his physician as soon as possible and he plans to make an appointment today. Patient's plan of care will be extended until 9/4/21 so that patient can continue treatment in the clinic once cleared by his physician. Patient has met 1 out of 5 goals set on evaluation. Progress toward goals has been slow since it is difficult for patient to attend appointments due to work commitments and a two hour drive to and from the clinic. Patient will continue to benefit from skilled PT services to  address swelling, analyze and address soft tissue restrictions, analyze compression product fit and use, instruct in home lymphedema management program and modify and progress therapeutic interventions to attain remaining goals. Progress towards goals / Updated goals:  Short term goals  Time frame: to be met by 4/5/2021, will extend all short term goals to be met by 9/4/21  1. Patient will demonstrate knowledge of signs/symptoms of infections/cellulitis and be independent in skin care to prevent cellulitis. Goal in progress. 2.  Patient will demonstrate independence in lymphedema home program of therapeutic exercises to improve circulation and decongest limb to improve ADLs. Goal in progress. 3.  Patient will tolerate multi-layer bandages (MLB) and show measureable decrease in limb volume and/or participate in the selection process to allow ordering of home compression system (daytime, nighttime garments and pump as needed). Patient has received the home vaso-pneumatic device and compression products for the lower legs and feet. Goal met 6/9/21.     Long term goals  Time frame: to be met by 6/26/2021, goal will be extended to be met by 9/4/21  1. Patient will be independent with don/doff of compression system and use in order to prevent reaccumulation of fluid at discharge. Goal in progress. 2.  Pt will be independent in self-MLD and show stable limb volumes showing decongestion and pt. ready for transition to independent restorative phase of lymphedema therapy.  Goal in progress. PLAN  [x]  Upgrade activities as tolerated     [x]  Extend plan of care so that patient can follow up with his PCP regarding wound on the L foot and return to the clinic when safe to proceed with the vaso-pneumatic pump and compression garments. []  Update interventions per flow sheet       []  Discharge due to:_  []  Other:_      Gerber Vazquez, PT, CLT 6/9/2021   TREATMENT PLAN EFFECTIVE DATES:   6/9/2021 TO 9/4/2021  I have read the above plan of care for Aileen Kay. I certify the above prescribed services are required by this patient and are medically necessary. The above plan of care has been developed in conjunction with the lymphedema/physical therapist.   Physician Signature: ____________________________________________________      Damon Floyd MD         Date: _________________________________________________________________

## 2021-06-21 ENCOUNTER — APPOINTMENT (OUTPATIENT)
Dept: GENERAL RADIOLOGY | Age: 61
DRG: 623 | End: 2021-06-21
Attending: EMERGENCY MEDICINE
Payer: COMMERCIAL

## 2021-06-21 ENCOUNTER — HOSPITAL ENCOUNTER (INPATIENT)
Age: 61
LOS: 5 days | Discharge: HOME | DRG: 623 | End: 2021-06-26
Attending: EMERGENCY MEDICINE | Admitting: INTERNAL MEDICINE
Payer: COMMERCIAL

## 2021-06-21 ENCOUNTER — APPOINTMENT (OUTPATIENT)
Dept: MRI IMAGING | Age: 61
DRG: 623 | End: 2021-06-21
Attending: EMERGENCY MEDICINE
Payer: COMMERCIAL

## 2021-06-21 DIAGNOSIS — S91.302A NON-HEALING WOUND OF LEFT HEEL: Primary | ICD-10-CM

## 2021-06-21 DIAGNOSIS — L97.523 NON-HEALING ULCER OF LEFT FOOT WITH NECROSIS OF MUSCLE (HCC): ICD-10-CM

## 2021-06-21 PROBLEM — E11.65 TYPE 2 DIABETES MELLITUS WITH HYPERGLYCEMIA, WITH LONG-TERM CURRENT USE OF INSULIN (HCC): Chronic | Status: ACTIVE | Noted: 2019-03-18

## 2021-06-21 PROBLEM — E66.01 OBESITY, MORBID (HCC): Chronic | Status: ACTIVE | Noted: 2019-02-14

## 2021-06-21 PROBLEM — L03.116 CELLULITIS OF FOOT, LEFT: Status: ACTIVE | Noted: 2021-06-21

## 2021-06-21 PROBLEM — I77.1 ILIAC ARTERY STENOSIS, LEFT (HCC): Chronic | Status: ACTIVE | Noted: 2021-06-21

## 2021-06-21 PROBLEM — E11.621: Status: ACTIVE | Noted: 2021-06-21

## 2021-06-21 PROBLEM — Z79.4 TYPE 2 DIABETES MELLITUS WITH HYPERGLYCEMIA, WITH LONG-TERM CURRENT USE OF INSULIN (HCC): Chronic | Status: ACTIVE | Noted: 2019-03-18

## 2021-06-21 PROBLEM — L97.529 NON-HEALING ULCER OF LEFT FOOT (HCC): Status: ACTIVE | Noted: 2021-06-21

## 2021-06-21 PROBLEM — I89.0 LYMPHEDEMA OF BOTH LOWER EXTREMITIES: Chronic | Status: ACTIVE | Noted: 2021-06-21

## 2021-06-21 PROBLEM — L97.529: Status: ACTIVE | Noted: 2021-06-21

## 2021-06-21 LAB
ALBUMIN SERPL-MCNC: 2.2 G/DL (ref 3.5–5)
ALBUMIN/GLOB SERPL: 0.5 {RATIO} (ref 1.1–2.2)
ALP SERPL-CCNC: 102 U/L (ref 45–117)
ALT SERPL-CCNC: 12 U/L (ref 12–78)
ANION GAP SERPL CALC-SCNC: 3 MMOL/L (ref 5–15)
AST SERPL-CCNC: 10 U/L (ref 15–37)
BASOPHILS # BLD: 0.1 K/UL (ref 0–0.1)
BASOPHILS NFR BLD: 0 % (ref 0–1)
BILIRUB SERPL-MCNC: 0.2 MG/DL (ref 0.2–1)
BUN SERPL-MCNC: 33 MG/DL (ref 6–20)
BUN/CREAT SERPL: 18 (ref 12–20)
CALCIUM SERPL-MCNC: 9.1 MG/DL (ref 8.5–10.1)
CHLORIDE SERPL-SCNC: 104 MMOL/L (ref 97–108)
CO2 SERPL-SCNC: 31 MMOL/L (ref 21–32)
CREAT SERPL-MCNC: 1.86 MG/DL (ref 0.7–1.3)
CRP SERPL HS-MCNC: >9.5 MG/L
DIFFERENTIAL METHOD BLD: ABNORMAL
EOSINOPHIL # BLD: 0.5 K/UL (ref 0–0.4)
EOSINOPHIL NFR BLD: 3 % (ref 0–7)
ERYTHROCYTE [DISTWIDTH] IN BLOOD BY AUTOMATED COUNT: 13.1 % (ref 11.5–14.5)
ERYTHROCYTE [SEDIMENTATION RATE] IN BLOOD: 92 MM/HR (ref 0–20)
GLOBULIN SER CALC-MCNC: 4.7 G/DL (ref 2–4)
GLUCOSE SERPL-MCNC: 154 MG/DL (ref 65–100)
HCT VFR BLD AUTO: 38.8 % (ref 36.6–50.3)
HGB BLD-MCNC: 12.7 G/DL (ref 12.1–17)
IMM GRANULOCYTES # BLD AUTO: 0.1 K/UL (ref 0–0.04)
IMM GRANULOCYTES NFR BLD AUTO: 1 % (ref 0–0.5)
LYMPHOCYTES # BLD: 1.9 K/UL (ref 0.8–3.5)
LYMPHOCYTES NFR BLD: 13 % (ref 12–49)
MCH RBC QN AUTO: 28.7 PG (ref 26–34)
MCHC RBC AUTO-ENTMCNC: 32.7 G/DL (ref 30–36.5)
MCV RBC AUTO: 87.6 FL (ref 80–99)
MONOCYTES # BLD: 1 K/UL (ref 0–1)
MONOCYTES NFR BLD: 7 % (ref 5–13)
NEUTS SEG # BLD: 10.5 K/UL (ref 1.8–8)
NEUTS SEG NFR BLD: 76 % (ref 32–75)
NRBC # BLD: 0 K/UL (ref 0–0.01)
NRBC BLD-RTO: 0 PER 100 WBC
PLATELET # BLD AUTO: 281 K/UL (ref 150–400)
PMV BLD AUTO: 9.8 FL (ref 8.9–12.9)
POTASSIUM SERPL-SCNC: 4.5 MMOL/L (ref 3.5–5.1)
PROT SERPL-MCNC: 6.9 G/DL (ref 6.4–8.2)
RBC # BLD AUTO: 4.43 M/UL (ref 4.1–5.7)
SODIUM SERPL-SCNC: 138 MMOL/L (ref 136–145)
WBC # BLD AUTO: 14.1 K/UL (ref 4.1–11.1)

## 2021-06-21 PROCEDURE — 74011000258 HC RX REV CODE- 258: Performed by: INTERNAL MEDICINE

## 2021-06-21 PROCEDURE — 74011250636 HC RX REV CODE- 250/636: Performed by: INTERNAL MEDICINE

## 2021-06-21 PROCEDURE — 65270000029 HC RM PRIVATE

## 2021-06-21 PROCEDURE — 86141 C-REACTIVE PROTEIN HS: CPT

## 2021-06-21 PROCEDURE — 73630 X-RAY EXAM OF FOOT: CPT

## 2021-06-21 PROCEDURE — 80053 COMPREHEN METABOLIC PANEL: CPT

## 2021-06-21 PROCEDURE — 74011636320 HC RX REV CODE- 636/320: Performed by: INTERNAL MEDICINE

## 2021-06-21 PROCEDURE — A9576 INJ PROHANCE MULTIPACK: HCPCS | Performed by: INTERNAL MEDICINE

## 2021-06-21 PROCEDURE — 85025 COMPLETE CBC W/AUTO DIFF WBC: CPT

## 2021-06-21 PROCEDURE — 73720 MRI LWR EXTREMITY W/O&W/DYE: CPT

## 2021-06-21 PROCEDURE — 99284 EMERGENCY DEPT VISIT MOD MDM: CPT

## 2021-06-21 PROCEDURE — 36415 COLL VENOUS BLD VENIPUNCTURE: CPT

## 2021-06-21 PROCEDURE — 85652 RBC SED RATE AUTOMATED: CPT

## 2021-06-21 RX ORDER — CLOPIDOGREL BISULFATE 75 MG/1
75 TABLET ORAL DAILY
Status: DISCONTINUED | OUTPATIENT
Start: 2021-06-22 | End: 2021-06-26 | Stop reason: HOSPADM

## 2021-06-21 RX ORDER — VANCOMYCIN 2 GRAM/500 ML IN 0.9 % SODIUM CHLORIDE INTRAVENOUS
2000 EVERY 24 HOURS
Status: DISCONTINUED | OUTPATIENT
Start: 2021-06-21 | End: 2021-06-24 | Stop reason: DRUGHIGH

## 2021-06-21 RX ORDER — ACETAMINOPHEN 325 MG/1
650 TABLET ORAL
Status: DISCONTINUED | OUTPATIENT
Start: 2021-06-21 | End: 2021-06-26 | Stop reason: HOSPADM

## 2021-06-21 RX ORDER — PRAVASTATIN SODIUM 40 MG/1
80 TABLET ORAL
Status: DISCONTINUED | OUTPATIENT
Start: 2021-06-21 | End: 2021-06-26 | Stop reason: HOSPADM

## 2021-06-21 RX ORDER — GUAIFENESIN 100 MG/5ML
81 LIQUID (ML) ORAL DAILY
Status: DISCONTINUED | OUTPATIENT
Start: 2021-06-22 | End: 2021-06-22

## 2021-06-21 RX ORDER — INSULIN LISPRO 100 [IU]/ML
15 INJECTION, SOLUTION INTRAVENOUS; SUBCUTANEOUS
Status: DISCONTINUED | OUTPATIENT
Start: 2021-06-22 | End: 2021-06-24

## 2021-06-21 RX ORDER — AMLODIPINE BESYLATE 5 MG/1
10 TABLET ORAL DAILY
Status: DISCONTINUED | OUTPATIENT
Start: 2021-06-22 | End: 2021-06-26 | Stop reason: HOSPADM

## 2021-06-21 RX ORDER — METRONIDAZOLE 500 MG/100ML
500 INJECTION, SOLUTION INTRAVENOUS EVERY 12 HOURS
Status: DISCONTINUED | OUTPATIENT
Start: 2021-06-21 | End: 2021-06-26 | Stop reason: HOSPADM

## 2021-06-21 RX ORDER — ATENOLOL 50 MG/1
100 TABLET ORAL DAILY
Status: DISCONTINUED | OUTPATIENT
Start: 2021-06-22 | End: 2021-06-26 | Stop reason: HOSPADM

## 2021-06-21 RX ORDER — ONDANSETRON 2 MG/ML
4 INJECTION INTRAMUSCULAR; INTRAVENOUS
Status: DISCONTINUED | OUTPATIENT
Start: 2021-06-21 | End: 2021-06-26 | Stop reason: HOSPADM

## 2021-06-21 RX ORDER — SODIUM CHLORIDE 0.9 % (FLUSH) 0.9 %
5-40 SYRINGE (ML) INJECTION EVERY 8 HOURS
Status: DISCONTINUED | OUTPATIENT
Start: 2021-06-21 | End: 2021-06-26 | Stop reason: HOSPADM

## 2021-06-21 RX ORDER — DEXTROSE 50 % IN WATER (D50W) INTRAVENOUS SYRINGE
12.5-25 AS NEEDED
Status: DISCONTINUED | OUTPATIENT
Start: 2021-06-21 | End: 2021-06-26 | Stop reason: HOSPADM

## 2021-06-21 RX ORDER — MAGNESIUM SULFATE 100 %
4 CRYSTALS MISCELLANEOUS AS NEEDED
Status: DISCONTINUED | OUTPATIENT
Start: 2021-06-21 | End: 2021-06-26 | Stop reason: HOSPADM

## 2021-06-21 RX ORDER — INSULIN GLARGINE 100 [IU]/ML
74 INJECTION, SOLUTION SUBCUTANEOUS
Status: DISCONTINUED | OUTPATIENT
Start: 2021-06-21 | End: 2021-06-26 | Stop reason: HOSPADM

## 2021-06-21 RX ORDER — SODIUM CHLORIDE 0.9 % (FLUSH) 0.9 %
5-40 SYRINGE (ML) INJECTION AS NEEDED
Status: DISCONTINUED | OUTPATIENT
Start: 2021-06-21 | End: 2021-06-26 | Stop reason: HOSPADM

## 2021-06-21 RX ORDER — GLIPIZIDE 5 MG/1
5 TABLET ORAL
Status: DISCONTINUED | OUTPATIENT
Start: 2021-06-22 | End: 2021-06-24

## 2021-06-21 RX ORDER — METRONIDAZOLE 500 MG/100ML
500 INJECTION, SOLUTION INTRAVENOUS EVERY 8 HOURS
Status: DISCONTINUED | OUTPATIENT
Start: 2021-06-21 | End: 2021-06-21 | Stop reason: DRUGHIGH

## 2021-06-21 RX ORDER — ONDANSETRON 4 MG/1
4 TABLET, ORALLY DISINTEGRATING ORAL
Status: DISCONTINUED | OUTPATIENT
Start: 2021-06-21 | End: 2021-06-26 | Stop reason: HOSPADM

## 2021-06-21 RX ORDER — HEPARIN SODIUM 5000 [USP'U]/ML
7500 INJECTION, SOLUTION INTRAVENOUS; SUBCUTANEOUS EVERY 8 HOURS
Status: DISCONTINUED | OUTPATIENT
Start: 2021-06-21 | End: 2021-06-22

## 2021-06-21 RX ORDER — INSULIN LISPRO 100 [IU]/ML
INJECTION, SOLUTION INTRAVENOUS; SUBCUTANEOUS
Status: DISCONTINUED | OUTPATIENT
Start: 2021-06-21 | End: 2021-06-26 | Stop reason: HOSPADM

## 2021-06-21 RX ORDER — ACETAMINOPHEN 650 MG/1
650 SUPPOSITORY RECTAL
Status: DISCONTINUED | OUTPATIENT
Start: 2021-06-21 | End: 2021-06-26 | Stop reason: HOSPADM

## 2021-06-21 RX ADMIN — GADOTERIDOL 20 ML: 279.3 INJECTION, SOLUTION INTRAVENOUS at 21:30

## 2021-06-21 RX ADMIN — CEFEPIME HYDROCHLORIDE 2 G: 2 INJECTION, POWDER, FOR SOLUTION INTRAVENOUS at 22:23

## 2021-06-21 NOTE — H&P
Hospitalist Admission Note    NAME: Gerardo Cintron   :  1960   MRN:  [id number]     Date/Time:  2021 7:48 PM    Patient PCP: Ebony Peralta MD vascular surgery= Dr. Dariel Castañdea  ______________________________________________________________________  Given the patient's current clinical presentation, I have a high level of concern for decompensation if discharged from the emergency department. Complex decision making was performed, which includes reviewing the patient's available past medical records, laboratory results, and x-ray films. My assessment of this patient's clinical condition and my plan of care is as follows. Assessment / Plan:  Left foot cellulitis POA  With chronic left foot diabetic ulcer-nonhealing POA  Type 2 diabetes with long-term use of insulin with hyperglycemia POA  CKD stage III with likely diabetic nephropathy POA  Chronic Lymphedema of both lower extremities POA  Left ilac artery stenosis status post stent in 2021-as per patient  Hypertension  Tobacco abuse disorder POA  WBC 14.1  Afebrile  X-ray left foot= chronic plantar wound/midfoot with diffuse soft tissue swelling  Creatinine 1.8  Last hemoglobin A1c 5.9  CRP >9.5    Admit to medical bed  Diabetic cardiac diet for now, n.p.o. after midnight till seen by podiatrist in the morning and further OR plans drafted. Empiric IV antibiotics for now-IV vancomycin + cefepime + Flagyl for now  Check MRI left foot with and without contrast as recommended by vascular surgery on-call Dr Anthony José podiatry consult as per recommendation of vascular surgery  Patient apparently not on any antiplatelet agents-we will start him on aspirin and Plavix for now  Continue high-dose statin as at home  Fingersticks before every meal and at bedtime. ,  Continue home Lantus 74 units nightly, continue home lispro 15 units q. AC + sliding scale correction here  Holding home Ozempic q.  Weekly  Continue home glipizide 10 mg every 12 when eating, holding home metformin for now  Holding home Vasotec and hydrochlorothiazide, continue atenolol and amlodipine  Tobacco cessation counseling given in the ED    Morbid Obesity POA  Weight loss recommended      Code Status: Full code as per patient's wishes  Surrogate Decision Maker: Bre Avila (significant other) as per patient    DVT Prophylaxis: Subcu heparin  GI Prophylaxis: not indicated    Baseline: Patient lives with significant other at home, independent with ADLs, has been dealing with chronic lymphedema of both legs      Subjective:   CHIEF COMPLAINT: Nonhealing left foot plantar wound for 2 to 3 weeks    HISTORY OF PRESENT ILLNESS:     Allen Ahuja is a 63 y.o.  male who presents with above complaints from home at the instructions of Dr. Samia Rodney for wound check. Patient present with chief complaint of nonhealing left foot plantar wound for the past 2 to 3 weeks. History of having left iliac artery stenosis status post stent placed by vascular surgery Dr. Samia Rodney in January 2021. Patient has history of chronic lymphedema of both legs at baseline. Patient was found to have elevated WBC count at 14.1 with elevated CRP and x-ray left foot showing diffuse soft tissue swelling with a chronic plantar/midfoot wound on the work-up in the ED. We were asked to admit for work up and evaluation of the above problems. No past medical history on file. No past surgical history on file. Social History     Tobacco Use    Smoking status: Former Smoker    Smokeless tobacco: Never Used   Substance Use Topics    Alcohol use: Not on file      Family history  History of diabetes  No Known Allergies     Prior to Admission medications    Medication Sig Start Date End Date Taking? Authorizing Provider   semaglutide (Ozempic) 0.25 mg/0.2 mL (2 mg/1.5 mL) sub-q pen 0.25 mg by SubCUTAneous route every seven (7) days.  2/19/21   Billie Del Cid MD   insulin lispro (HumaLOG KwikPen Insulin) 100 unit/mL kwikpen INJECT 20 UNITS SUBCUTANEOUSLY 3 TIMES A DAY WITH MEALS 9/23/20   Garcia Zavala MD   furosemide (LASIX) 20 mg tablet Take 1 Tab by mouth daily. Indications: visible water retention 8/21/20   Garcia Zavala MD   semaglutide (Ozempic) 0.25 mg/0.2 mL (2 mg/1.5 mL) sub-q pen 0.25 mg by SubCUTAneous route every seven (7) days. 6/25/20   Garcia Zavala MD   OneTouch Ultra Blue Test Strip strip CHECK BLOOD SUGAR ONCE DAILY 5/6/20   Provider, Historical   amLODIPine (NORVASC) 10 mg tablet Take 10 mg by mouth daily. 12/20/18   Provider, Historical   atenolol (TENORMIN) 100 mg tablet Take 100 mg by mouth daily. 1/30/19   Provider, Historical   enalapril (VASOTEC) 20 mg tablet Take 20 mg by mouth daily. 1/15/19   Provider, Historical   glipiZIDE SR (GLUCOTROL XL) 10 mg CR tablet Take 10 mg by mouth two (2) times a day. 12/20/18   Provider, Historical   hydroCHLOROthiazide (HYDRODIURIL) 25 mg tablet take 1 tablet by mouth once daily 1/30/19   Provider, Historical   BASAGLAR KWIKPEN U-100 INSULIN 100 unit/mL (3 mL) inpn 74 Units by SubCUTAneous route daily. 12/5/18   Provider, Historical   metFORMIN (GLUCOPHAGE) 1,000 mg tablet 1,000 mg two (2) times a day. 12/20/18   Provider, Historical   pravastatin (PRAVACHOL) 80 mg tablet Take 80 mg by mouth nightly.  1/15/19   Provider, Historical       REVIEW OF SYSTEMS:         Total of 12 systems reviewed as follows:       POSITIVE= underlined text  Negative = text not underlined  General:  fever, chills, sweats, generalized weakness, weight loss/gain,      loss of appetite   Eyes:    blurred vision, eye pain, loss of vision, double vision  ENT:    rhinorrhea, pharyngitis   Respiratory:   cough, sputum production, SOB, FELIX, wheezing, pleuritic pain   Cardiology:   chest pain, palpitations, orthopnea, PND, edema, syncope   Gastrointestinal:  abdominal pain , N/V, diarrhea, dysphagia, constipation, bleeding   Genitourinary:  frequency, urgency, dysuria, hematuria, incontinence   Muskuloskeletal :  arthralgia, myalgia, back pain  Hematology:  easy bruising, nose or gum bleeding, lymphadenopathy   Dermatological: rash, ulceration, color change / jaundice  Endocrine:   hot flashes or polydipsia   Neurological:  headache, dizziness, confusion, focal weakness, paresthesia,     Speech difficulties, memory loss, gait difficulty  Psychological: Feelings of anxiety, depression, agitation    Objective:   VITALS:    Visit Vitals  BP (!) 148/61   Pulse 82   Temp 98 °F (36.7 °C)   Resp 16   Ht 6' (1.829 m)   Wt 150.3 kg (331 lb 5.6 oz)   SpO2 98%   BMI 44.94 kg/m²       PHYSICAL EXAM:    General:    Alert, cooperative, no distress, appears stated age. Morbid obesity+  HEENT: Atraumatic, anicteric sclerae, pink conjunctivae     No oral ulcers, mucosa moist, throat clear, dentition fair  Neck:  Supple, symmetrical,  thyroid: non tender  Lungs:   Clear to auscultation bilaterally. No Wheezing or Rhonchi. No rales. Chest wall:  No tenderness  No Accessory muscle use. Heart:   Regular  rhythm,  No  murmur   B/L LE edema+  Abdomen:   Soft, non-tender. Not distended. Bowel sounds normal  Extremities: No cyanosis. No clubbing, chronic bilateral lower extremity lymphedema noted    Skin turgor normal, Capillary refill normal, Radial dial pulse 2+,  Skin:     Left foot plantar surface wound/ulcer with nonhealing edges noted +  Psych:  Good insight. Not depressed. Not anxious or agitated. Neurologic: EOMs intact. No facial asymmetry. No aphasia or slurred speech. Symmetrical strength, Sensation grossly intact.  Alert and oriented X 4.     _______________________________________________________________________  Care Plan discussed with:    Comments   Patient x    Family      RN x    Care Manager                    Consultant:  aman Gutierrez   _______________________________________________________________________  Expected  Disposition:   Home with Family x   HH/PT/OT/RN ?   UMAIR/LTC    ROBERT ________________________________________________________________________  TOTAL TIME:  54 Minutes    Critical Care Provided     Minutes non procedure based      Comments    x Reviewed previous records   >50% of visit spent in counseling and coordination of care x Discussion with patient and questions answered       ________________________________________________________________________  Signed: Bebe Shore MD    Procedures: see electronic medical records for all procedures/Xrays and details which were not copied into this note but were reviewed prior to creation of Plan. LAB DATA REVIEWED:    Recent Results (from the past 24 hour(s))   METABOLIC PANEL, COMPREHENSIVE    Collection Time: 06/21/21  5:30 PM   Result Value Ref Range    Sodium 138 136 - 145 mmol/L    Potassium 4.5 3.5 - 5.1 mmol/L    Chloride 104 97 - 108 mmol/L    CO2 31 21 - 32 mmol/L    Anion gap 3 (L) 5 - 15 mmol/L    Glucose 154 (H) 65 - 100 mg/dL    BUN 33 (H) 6 - 20 MG/DL    Creatinine 1.86 (H) 0.70 - 1.30 MG/DL    BUN/Creatinine ratio 18 12 - 20      GFR est AA 45 (L) >60 ml/min/1.73m2    GFR est non-AA 37 (L) >60 ml/min/1.73m2    Calcium 9.1 8.5 - 10.1 MG/DL    Bilirubin, total 0.2 0.2 - 1.0 MG/DL    ALT (SGPT) 12 12 - 78 U/L    AST (SGOT) 10 (L) 15 - 37 U/L    Alk.  phosphatase 102 45 - 117 U/L    Protein, total 6.9 6.4 - 8.2 g/dL    Albumin 2.2 (L) 3.5 - 5.0 g/dL    Globulin 4.7 (H) 2.0 - 4.0 g/dL    A-G Ratio 0.5 (L) 1.1 - 2.2     CBC WITH AUTOMATED DIFF    Collection Time: 06/21/21  6:41 PM   Result Value Ref Range    WBC 14.1 (H) 4.1 - 11.1 K/uL    RBC 4.43 4.10 - 5.70 M/uL    HGB 12.7 12.1 - 17.0 g/dL    HCT 38.8 36.6 - 50.3 %    MCV 87.6 80.0 - 99.0 FL    MCH 28.7 26.0 - 34.0 PG    MCHC 32.7 30.0 - 36.5 g/dL    RDW 13.1 11.5 - 14.5 %    PLATELET 767 109 - 346 K/uL    MPV 9.8 8.9 - 12.9 FL    NRBC 0.0 0  WBC    ABSOLUTE NRBC 0.00 0.00 - 0.01 K/uL    NEUTROPHILS 76 (H) 32 - 75 %    LYMPHOCYTES 13 12 - 49 %    MONOCYTES 7 5 - 13 %    EOSINOPHILS 3 0 - 7 %    BASOPHILS 0 0 - 1 %    IMMATURE GRANULOCYTES 1 (H) 0.0 - 0.5 %    ABS. NEUTROPHILS 10.5 (H) 1.8 - 8.0 K/UL    ABS. LYMPHOCYTES 1.9 0.8 - 3.5 K/UL    ABS. MONOCYTES 1.0 0.0 - 1.0 K/UL    ABS. EOSINOPHILS 0.5 (H) 0.0 - 0.4 K/UL    ABS. BASOPHILS 0.1 0.0 - 0.1 K/UL    ABS. IMM.  GRANS. 0.1 (H) 0.00 - 0.04 K/UL    DF AUTOMATED     SED RATE (ESR)    Collection Time: 06/21/21  6:41 PM   Result Value Ref Range    Sed rate, automated 92 (H) 0 - 20 mm/hr

## 2021-06-21 NOTE — ED NOTES
Missael Coon MD spoke with Dr. Caryle Dales - no need for CHRIS at this time. Nursing supervisor called to notify vascular tech.

## 2021-06-21 NOTE — PROGRESS NOTES

## 2021-06-21 NOTE — ED PROVIDER NOTES
EMERGENCY DEPARTMENT HISTORY AND PHYSICAL EXAM     ----------------------------------------------------------------------------  Please note that this dictation was completed with [employer], the computer voice recognition software. Quite often unanticipated grammatical, syntax, homophones, and other interpretive errors are inadvertently transcribed by the computer software. Please disregard these errors. Please excuse any errors that have escaped final proofreading  ----------------------------------------------------------------------------      Date: 6/21/2021  Patient Name: Monica Boucher    History of Presenting Illness     Chief Complaint   Patient presents with   [facility] Wound Check     sent over by Dr Jeremiah Shepard to have a wound on his left foot evaluated. wound has been present for 2-3 weeks. He has lymphedema in his legs       History Provided By:  Patient, Dr. Jeremiah Shepard    HPI: Monica Boucher is a 63 y.o. male, with significant pmhx of diabetes, lymphedema, vascular insufficiency of lower extremities with iliac stent, who presents via private vehicle  to the ED with c/o nonhealing wound to plantar surface of left foot. This that he was referred to the emergency department for further evaluation by Dr. Eleni Rodriguez a vascular surgery. Upon contacting Dr. Eleni Rodriguez he notes that patient has normal ABIs in the office and a patent vascular stent in his iliac artery. Requests admission to hospital service with MRI, labs, podiatry evaluation tomorrow morning. Patient has no complaints at time of my evaluation. Patient specifically denies any associated fevers, chills, CP, SOB, nausea, vomiting, diarrhea, abd pain, changes in BM, urinary sxs, or headache. Social Hx: denies tobacco  denies EtOH , denies Illicit Drugs    There are no other complaints, changes, or physical findings at this time.      PCP: Monet Pike MD    No Known Allergies    Current Facility-Administered Medications   Medication Dose Route Frequency Provider Last Rate Last Admin    ondansetron [facility]) injection 4 mg  4 mg IntraVENous Q4H PRN Matt Villegas MD        acetaminophen (TYLENOL) tablet 650 mg  650 mg Oral Q6H PRN Matt Villegas MD         Current Outpatient Medications   Medication Sig Dispense Refill    semaglutide (Ozempic) 0.25 mg/0.2 mL (2 mg/1.5 mL) sub-q pen 0.25 mg by SubCUTAneous route every seven (7) days. 1 Pen 3    insulin lispro (HumaLOG KwikPen Insulin) 100 unit/mL kwikpen INJECT 20 UNITS SUBCUTANEOUSLY 3 TIMES A DAY WITH MEALS 20 Adjustable Dose Pre-filled Pen Syringe 3    furosemide (LASIX) 20 mg tablet Take 1 Tab by mouth daily. Indications: visible water retention 90 Tab 3    semaglutide (Ozempic) 0.25 mg/0.2 mL (2 mg/1.5 mL) sub-q pen 0.25 mg by SubCUTAneous route every seven (7) days. 1 Pen 2    OneTouch Ultra Blue Test Strip strip CHECK BLOOD SUGAR ONCE DAILY      amLODIPine (NORVASC) 10 mg tablet Take 10 mg by mouth daily. 1    atenolol (TENORMIN) 100 mg tablet Take 100 mg by mouth daily. 1    enalapril (VASOTEC) 20 mg tablet Take 20 mg by mouth daily. 0    glipiZIDE SR (GLUCOTROL XL) 10 mg CR tablet Take 10 mg by mouth two (2) times a day.  0    hydroCHLOROthiazide (HYDRODIURIL) 25 mg tablet take 1 tablet by mouth once daily  0    BASAGLAR KWIKPEN U-100 INSULIN 100 unit/mL (3 mL) inpn 74 Units by SubCUTAneous route daily. 0    metFORMIN (GLUCOPHAGE) 1,000 mg tablet 1,000 mg two (2) times a day.  0    pravastatin (PRAVACHOL) 80 mg tablet Take 80 mg by mouth nightly. 0       Past History     Past Medical History:  No past medical history on file. Past Surgical History:  No past surgical history on file. Family History:  No family history on file.     Social History:  Social History     Tobacco Use    Smoking status: Former Smoker    Smokeless tobacco: Never Used   Substance Use Topics    Alcohol use: Not on file    Drug use: Not on file       Allergies:  No Known Allergies      Review of Systems Review of Systems   Constitutional: Negative for chills and fever. HENT: Negative. Eyes: Negative. Respiratory: Negative for cough, chest tightness and shortness of breath. Cardiovascular: Negative for chest pain and leg swelling. Gastrointestinal: Negative for abdominal pain, diarrhea, nausea and vomiting. Endocrine: Negative. Genitourinary: Negative for difficulty urinating and dysuria. Musculoskeletal: Negative for myalgias. Skin: Positive for wound. Neurological: Negative. Psychiatric/Behavioral: Negative. All other systems reviewed and are negative. Physical Exam   Physical Exam  Vitals and nursing note reviewed. Constitutional:       General: He is not in acute distress. Appearance: He is well-developed. He is not diaphoretic. HENT:      Head: Normocephalic and atraumatic. Nose: Nose normal.      Mouth/Throat:      Pharynx: No oropharyngeal exudate. Eyes:      Conjunctiva/sclera: Conjunctivae normal.      Pupils: Pupils are equal, round, and reactive to light. Neck:      Vascular: No JVD. Cardiovascular:      Rate and Rhythm: Normal rate and regular rhythm. Pulses:           Posterior tibial pulses are 1+ on the left side. Heart sounds: Normal heart sounds. No murmur heard. No friction rub. Pulmonary:      Effort: Pulmonary effort is normal. No respiratory distress. Breath sounds: Normal breath sounds. No stridor. No wheezing or rales. Abdominal:      General: Bowel sounds are normal. There is no distension. Palpations: Abdomen is soft. Tenderness: There is no abdominal tenderness. There is no rebound. Musculoskeletal:         General: No tenderness. Normal range of motion. Cervical back: Normal range of motion and neck supple. Feet:    Feet:      Left foot:      Skin integrity: Ulcer and callus present. Comments: See picture for further details  Skin:     General: Skin is warm and dry.       Capillary Refill: Capillary refill takes less than 2 seconds. Findings: Lesion present. No rash. Neurological:      Mental Status: He is alert and oriented to person, place, and time. Cranial Nerves: No cranial nerve deficit. Psychiatric:         Speech: Speech normal.         Behavior: Behavior normal.         Thought Content: Thought content normal.         Judgment: Judgment normal.                       Diagnostic Study Results     Labs -     Recent Results (from the past 12 hour(s))   METABOLIC PANEL, COMPREHENSIVE    Collection Time: 06/21/21  5:30 PM   Result Value Ref Range    Sodium 138 136 - 145 mmol/L    Potassium 4.5 3.5 - 5.1 mmol/L    Chloride 104 97 - 108 mmol/L    CO2 31 21 - 32 mmol/L    Anion gap 3 (L) 5 - 15 mmol/L    Glucose 154 (H) 65 - 100 mg/dL    BUN 33 (H) 6 - 20 MG/DL    Creatinine 1.86 (H) 0.70 - 1.30 MG/DL    BUN/Creatinine ratio 18 12 - 20      GFR est AA 45 (L) >60 ml/min/1.73m2    GFR est non-AA 37 (L) >60 ml/min/1.73m2    Calcium 9.1 8.5 - 10.1 MG/DL    Bilirubin, total 0.2 0.2 - 1.0 MG/DL    ALT (SGPT) 12 12 - 78 U/L    AST (SGOT) 10 (L) 15 - 37 U/L    Alk. phosphatase 102 45 - 117 U/L    Protein, total 6.9 6.4 - 8.2 g/dL    Albumin 2.2 (L) 3.5 - 5.0 g/dL    Globulin 4.7 (H) 2.0 - 4.0 g/dL    A-G Ratio 0.5 (L) 1.1 - 2.2     CBC WITH AUTOMATED DIFF    Collection Time: 06/21/21  6:41 PM   Result Value Ref Range    WBC 14.1 (H) 4.1 - 11.1 K/uL    RBC 4.43 4.10 - 5.70 M/uL    HGB 12.7 12.1 - 17.0 g/dL    HCT 38.8 36.6 - 50.3 %    MCV 87.6 80.0 - 99.0 FL    MCH 28.7 26.0 - 34.0 PG    MCHC 32.7 30.0 - 36.5 g/dL    RDW 13.1 11.5 - 14.5 %    PLATELET 020 329 - 049 K/uL    MPV 9.8 8.9 - 12.9 FL    NRBC 0.0 0  WBC    ABSOLUTE NRBC 0.00 0.00 - 0.01 K/uL    NEUTROPHILS 76 (H) 32 - 75 %    LYMPHOCYTES 13 12 - 49 %    MONOCYTES 7 5 - 13 %    EOSINOPHILS 3 0 - 7 %    BASOPHILS 0 0 - 1 %    IMMATURE GRANULOCYTES 1 (H) 0.0 - 0.5 %    ABS. NEUTROPHILS 10.5 (H) 1.8 - 8.0 K/UL    ABS. LYMPHOCYTES 1.9 0.8 - 3.5 K/UL    ABS. MONOCYTES 1.0 0.0 - 1.0 K/UL    ABS. EOSINOPHILS 0.5 (H) 0.0 - 0.4 K/UL    ABS. BASOPHILS 0.1 0.0 - 0.1 K/UL    ABS. IMM. GRANS. 0.1 (H) 0.00 - 0.04 K/UL    DF AUTOMATED     SED RATE (ESR)    Collection Time: 06/21/21  6:41 PM   Result Value Ref Range    Sed rate, automated 92 (H) 0 - 20 mm/hr       Radiologic Studies -   XR FOOT LT MIN 3 V   Final Result   1. Soft tissue wound of the plantar aspect of the midfoot. 2. Charcot arthropathy in the midfoot. 3. Diffuse soft tissue swelling of the visualized extremity. MRI FOOT LT W WO CONT    (Results Pending)     CT Results  (Last 48 hours)    None        CXR Results  (Last 48 hours)    None            Medical Decision Making   I am the first provider for this patient. I reviewed the vital signs, available nursing notes, past medical history, past surgical history, family history and social history. Vital Signs-Reviewed the patient's vital signs. Patient Vitals for the past 12 hrs:   Temp Pulse Resp BP SpO2   06/21/21 1720 98 °F (36.7 °C) 82 16 (!) 148/61 98 %       Pulse Oximetry Analysis - 98% on RA, normal  Rate: 82 bpm  Rhythm: nsr      Provider Notes (Medical Decision Making):     DDX:  No healing diabetic foot ulcer    Plan:  Vascular surgery consult, labs, lactate, blood cultures, MRI, admission to hospitalist service and podiatry consult    Impression:  Diabetic foot ulcer, nonhealing    ED Course:   Initial assessment performed. The patients presenting problems have been discussed, and they are in agreement with the care plan formulated and outlined with them. I have encouraged them to ask questions as they arise throughout their visit.     I reviewed the nursing notes and and vital signs from today's visit, as well as the electronic medical record system for any past medical records that were available that may contribute to the patients current condition, including previous vascular surgery notes    Nursing notes will be reviewed as they become available in realtime while the pt has been in the ED. Emi Herrera MD    CONSULT NOTE:   7:16 PM  Emi Herrera MD spoke with Dr. Ara Piña,   Specialty: Vascular surgery  Consulted Dr. Ara Piña due to pt' referral to ED and discussion of plan. Discussed pt's HPI and available diagnostic results thus far. Consultant recommends MRI, admission to the hospitalist service, podiatry evaluation in the morning. Emi Herrera MD      PROGRESS NOTE:  7:20 PM  Pt and family made aware of plan for admission with podiatry evaluation after MRI. In agreement with plan. Emi Herrera MD    CONSULT NOTE:   7:24 PM  Emi Herrera MD spoke with Dr. Ty Maradiaga,   Specialty: Perfecto Maradiaga due to non healing DM ulcer to foot and vascular surgery referral.  Discussed pt's HPI and available diagnostic results thus far. Expressed concerns for needed admission. Consultant will evaluate for admission. Emi Herrera MD    ADMISSION NOTE:  7:24 PM  Patient is being admitted to the hospital by Dr. Ty Maradiaga. The results of their tests and reasons for their admission have been discussed with them and/or available family. They convey agreement and understanding for the need to be admitted and for their admission diagnosis. Emi Herrera MD               Critical Care Time:     none      Diagnosis     Clinical Impression:   1. Non-healing wound of left heel        PLAN:  1.   Admit to hospitalist service

## 2021-06-21 NOTE — ROUTINE PROCESS

## 2021-06-22 ENCOUNTER — ANESTHESIA (OUTPATIENT)
Dept: SURGERY | Age: 61
DRG: 623 | End: 2021-06-22
Payer: COMMERCIAL

## 2021-06-22 ENCOUNTER — ANESTHESIA EVENT (OUTPATIENT)
Dept: SURGERY | Age: 61
DRG: 623 | End: 2021-06-22
Payer: COMMERCIAL

## 2021-06-22 LAB
ANION GAP SERPL CALC-SCNC: 3 MMOL/L (ref 5–15)
BUN SERPL-MCNC: 32 MG/DL (ref 6–20)
BUN/CREAT SERPL: 20 (ref 12–20)
CALCIUM SERPL-MCNC: 8.7 MG/DL (ref 8.5–10.1)
CHLORIDE SERPL-SCNC: 105 MMOL/L (ref 97–108)
CO2 SERPL-SCNC: 28 MMOL/L (ref 21–32)
CREAT SERPL-MCNC: 1.63 MG/DL (ref 0.7–1.3)
ERYTHROCYTE [DISTWIDTH] IN BLOOD BY AUTOMATED COUNT: 12.8 % (ref 11.5–14.5)
GLUCOSE BLD STRIP.AUTO-MCNC: 100 MG/DL (ref 65–117)
GLUCOSE BLD STRIP.AUTO-MCNC: 106 MG/DL (ref 65–117)
GLUCOSE BLD STRIP.AUTO-MCNC: 132 MG/DL (ref 65–117)
GLUCOSE BLD STRIP.AUTO-MCNC: 230 MG/DL (ref 65–117)
GLUCOSE SERPL-MCNC: 128 MG/DL (ref 65–100)
HCT VFR BLD AUTO: 34.5 % (ref 36.6–50.3)
HGB BLD-MCNC: 11 G/DL (ref 12.1–17)
MCH RBC QN AUTO: 28.4 PG (ref 26–34)
MCHC RBC AUTO-ENTMCNC: 31.9 G/DL (ref 30–36.5)
MCV RBC AUTO: 89.1 FL (ref 80–99)
NRBC # BLD: 0 K/UL (ref 0–0.01)
NRBC BLD-RTO: 0 PER 100 WBC
PLATELET # BLD AUTO: 220 K/UL (ref 150–400)
PMV BLD AUTO: 10 FL (ref 8.9–12.9)
POTASSIUM SERPL-SCNC: 3.9 MMOL/L (ref 3.5–5.1)
RBC # BLD AUTO: 3.87 M/UL (ref 4.1–5.7)
SERVICE CMNT-IMP: ABNORMAL
SERVICE CMNT-IMP: ABNORMAL
SERVICE CMNT-IMP: NORMAL
SERVICE CMNT-IMP: NORMAL
SODIUM SERPL-SCNC: 136 MMOL/L (ref 136–145)
WBC # BLD AUTO: 11.6 K/UL (ref 4.1–11.1)

## 2021-06-22 PROCEDURE — 65270000029 HC RM PRIVATE

## 2021-06-22 PROCEDURE — 74011250637 HC RX REV CODE- 250/637: Performed by: PODIATRIST

## 2021-06-22 PROCEDURE — 74011250636 HC RX REV CODE- 250/636: Performed by: PODIATRIST

## 2021-06-22 PROCEDURE — 82962 GLUCOSE BLOOD TEST: CPT

## 2021-06-22 PROCEDURE — 77030020754 HC CUF TRNQT 2BLA STRY -B: Performed by: PODIATRIST

## 2021-06-22 PROCEDURE — 2709999900 HC NON-CHARGEABLE SUPPLY: Performed by: PODIATRIST

## 2021-06-22 PROCEDURE — 77030008684 HC TU ET CUF COVD -B: Performed by: ANESTHESIOLOGY

## 2021-06-22 PROCEDURE — 74011250637 HC RX REV CODE- 250/637: Performed by: INTERNAL MEDICINE

## 2021-06-22 PROCEDURE — 77030013079 HC BLNKT BAIR HGGR 3M -A: Performed by: ANESTHESIOLOGY

## 2021-06-22 PROCEDURE — 74011250636 HC RX REV CODE- 250/636: Performed by: INTERNAL MEDICINE

## 2021-06-22 PROCEDURE — 77030038692 HC WND DEB SYS IRMX -B: Performed by: PODIATRIST

## 2021-06-22 PROCEDURE — 80048 BASIC METABOLIC PNL TOTAL CA: CPT

## 2021-06-22 PROCEDURE — 76010000149 HC OR TIME 1 TO 1.5 HR: Performed by: PODIATRIST

## 2021-06-22 PROCEDURE — 77030019908 HC STETH ESOPH SIMS -A: Performed by: ANESTHESIOLOGY

## 2021-06-22 PROCEDURE — 74011000250 HC RX REV CODE- 250: Performed by: PODIATRIST

## 2021-06-22 PROCEDURE — 74011250636 HC RX REV CODE- 250/636: Performed by: NURSE ANESTHETIST, CERTIFIED REGISTERED

## 2021-06-22 PROCEDURE — 77030002916 HC SUT ETHLN J&J -A: Performed by: PODIATRIST

## 2021-06-22 PROCEDURE — 76210000016 HC OR PH I REC 1 TO 1.5 HR: Performed by: PODIATRIST

## 2021-06-22 PROCEDURE — 87075 CULTR BACTERIA EXCEPT BLOOD: CPT

## 2021-06-22 PROCEDURE — 74011000258 HC RX REV CODE- 258: Performed by: PODIATRIST

## 2021-06-22 PROCEDURE — 77030026438 HC STYL ET INTUB CARD -A: Performed by: ANESTHESIOLOGY

## 2021-06-22 PROCEDURE — 74011000250 HC RX REV CODE- 250: Performed by: NURSE ANESTHETIST, CERTIFIED REGISTERED

## 2021-06-22 PROCEDURE — 74011636637 HC RX REV CODE- 636/637: Performed by: INTERNAL MEDICINE

## 2021-06-22 PROCEDURE — 85027 COMPLETE CBC AUTOMATED: CPT

## 2021-06-22 PROCEDURE — 74011000258 HC RX REV CODE- 258: Performed by: INTERNAL MEDICINE

## 2021-06-22 PROCEDURE — 36415 COLL VENOUS BLD VENIPUNCTURE: CPT

## 2021-06-22 PROCEDURE — 87076 CULTURE ANAEROBE IDENT EACH: CPT

## 2021-06-22 PROCEDURE — 77030040361 HC SLV COMPR DVT MDII -B: Performed by: PODIATRIST

## 2021-06-22 PROCEDURE — 0KBW0ZZ EXCISION OF LEFT FOOT MUSCLE, OPEN APPROACH: ICD-10-PCS | Performed by: PODIATRIST

## 2021-06-22 PROCEDURE — 87077 CULTURE AEROBIC IDENTIFY: CPT

## 2021-06-22 PROCEDURE — 76060000033 HC ANESTHESIA 1 TO 1.5 HR: Performed by: PODIATRIST

## 2021-06-22 PROCEDURE — 87205 SMEAR GRAM STAIN: CPT

## 2021-06-22 PROCEDURE — 87186 SC STD MICRODIL/AGAR DIL: CPT

## 2021-06-22 RX ORDER — FENTANYL CITRATE 50 UG/ML
INJECTION, SOLUTION INTRAMUSCULAR; INTRAVENOUS AS NEEDED
Status: DISCONTINUED | OUTPATIENT
Start: 2021-06-22 | End: 2021-06-22 | Stop reason: HOSPADM

## 2021-06-22 RX ORDER — LIDOCAINE HYDROCHLORIDE 20 MG/ML
INJECTION, SOLUTION EPIDURAL; INFILTRATION; INTRACAUDAL; PERINEURAL AS NEEDED
Status: DISCONTINUED | OUTPATIENT
Start: 2021-06-22 | End: 2021-06-22 | Stop reason: HOSPADM

## 2021-06-22 RX ORDER — DEXMEDETOMIDINE HYDROCHLORIDE 100 UG/ML
INJECTION, SOLUTION INTRAVENOUS AS NEEDED
Status: DISCONTINUED | OUTPATIENT
Start: 2021-06-22 | End: 2021-06-22 | Stop reason: HOSPADM

## 2021-06-22 RX ORDER — BUPIVACAINE HYDROCHLORIDE 5 MG/ML
INJECTION, SOLUTION EPIDURAL; INTRACAUDAL AS NEEDED
Status: DISCONTINUED | OUTPATIENT
Start: 2021-06-22 | End: 2021-06-22 | Stop reason: HOSPADM

## 2021-06-22 RX ORDER — ONDANSETRON 2 MG/ML
INJECTION INTRAMUSCULAR; INTRAVENOUS AS NEEDED
Status: DISCONTINUED | OUTPATIENT
Start: 2021-06-22 | End: 2021-06-22 | Stop reason: HOSPADM

## 2021-06-22 RX ORDER — HYDROCODONE BITARTRATE AND ACETAMINOPHEN 7.5; 325 MG/1; MG/1
1 TABLET ORAL
Status: DISCONTINUED | OUTPATIENT
Start: 2021-06-22 | End: 2021-06-26 | Stop reason: HOSPADM

## 2021-06-22 RX ORDER — GUAIFENESIN 100 MG/5ML
81 LIQUID (ML) ORAL DAILY
Status: DISCONTINUED | OUTPATIENT
Start: 2021-06-23 | End: 2021-06-26 | Stop reason: HOSPADM

## 2021-06-22 RX ORDER — SODIUM CHLORIDE, SODIUM LACTATE, POTASSIUM CHLORIDE, CALCIUM CHLORIDE 600; 310; 30; 20 MG/100ML; MG/100ML; MG/100ML; MG/100ML
INJECTION, SOLUTION INTRAVENOUS
Status: DISCONTINUED | OUTPATIENT
Start: 2021-06-22 | End: 2021-06-22 | Stop reason: HOSPADM

## 2021-06-22 RX ORDER — PROPOFOL 10 MG/ML
INJECTION, EMULSION INTRAVENOUS AS NEEDED
Status: DISCONTINUED | OUTPATIENT
Start: 2021-06-22 | End: 2021-06-22 | Stop reason: HOSPADM

## 2021-06-22 RX ORDER — SUCCINYLCHOLINE CHLORIDE 20 MG/ML
INJECTION INTRAMUSCULAR; INTRAVENOUS AS NEEDED
Status: DISCONTINUED | OUTPATIENT
Start: 2021-06-22 | End: 2021-06-22 | Stop reason: HOSPADM

## 2021-06-22 RX ORDER — DEXAMETHASONE SODIUM PHOSPHATE 4 MG/ML
INJECTION, SOLUTION INTRA-ARTICULAR; INTRALESIONAL; INTRAMUSCULAR; INTRAVENOUS; SOFT TISSUE AS NEEDED
Status: DISCONTINUED | OUTPATIENT
Start: 2021-06-22 | End: 2021-06-22 | Stop reason: HOSPADM

## 2021-06-22 RX ADMIN — LIDOCAINE HYDROCHLORIDE 100 MG: 20 INJECTION, SOLUTION INTRAVENOUS at 19:14

## 2021-06-22 RX ADMIN — PROPOFOL 50 MG: 10 INJECTION, EMULSION INTRAVENOUS at 20:01

## 2021-06-22 RX ADMIN — PRAVASTATIN SODIUM 80 MG: 40 TABLET ORAL at 01:06

## 2021-06-22 RX ADMIN — SODIUM CHLORIDE 10 ML: 9 INJECTION, SOLUTION INTRAMUSCULAR; INTRAVENOUS; SUBCUTANEOUS at 01:07

## 2021-06-22 RX ADMIN — METRONIDAZOLE 500 MG: 500 INJECTION, SOLUTION INTRAVENOUS at 22:02

## 2021-06-22 RX ADMIN — SODIUM CHLORIDE 10 ML: 9 INJECTION, SOLUTION INTRAMUSCULAR; INTRAVENOUS; SUBCUTANEOUS at 14:00

## 2021-06-22 RX ADMIN — PROPOFOL 50 MG: 10 INJECTION, EMULSION INTRAVENOUS at 19:39

## 2021-06-22 RX ADMIN — METRONIDAZOLE 500 MG: 500 INJECTION, SOLUTION INTRAVENOUS at 08:30

## 2021-06-22 RX ADMIN — INSULIN LISPRO 2 UNITS: 100 INJECTION, SOLUTION INTRAVENOUS; SUBCUTANEOUS at 12:25

## 2021-06-22 RX ADMIN — SUCCINYLCHOLINE CHLORIDE 200 MG: 20 INJECTION, SOLUTION INTRAMUSCULAR; INTRAVENOUS at 19:14

## 2021-06-22 RX ADMIN — PROPOFOL 50 MG: 10 INJECTION, EMULSION INTRAVENOUS at 19:28

## 2021-06-22 RX ADMIN — DEXMEDETOMIDINE HYDROCHLORIDE 10 MCG: 100 INJECTION, SOLUTION, CONCENTRATE INTRAVENOUS at 19:28

## 2021-06-22 RX ADMIN — DEXAMETHASONE SODIUM PHOSPHATE 8 MG: 4 INJECTION, SOLUTION INTRAMUSCULAR; INTRAVENOUS at 19:29

## 2021-06-22 RX ADMIN — ATENOLOL 100 MG: 50 TABLET ORAL at 08:29

## 2021-06-22 RX ADMIN — SODIUM CHLORIDE 10 ML: 9 INJECTION, SOLUTION INTRAMUSCULAR; INTRAVENOUS; SUBCUTANEOUS at 22:02

## 2021-06-22 RX ADMIN — PROPOFOL 200 MG: 10 INJECTION, EMULSION INTRAVENOUS at 19:14

## 2021-06-22 RX ADMIN — CEFEPIME HYDROCHLORIDE 2 G: 2 INJECTION, POWDER, FOR SOLUTION INTRAVENOUS at 22:02

## 2021-06-22 RX ADMIN — CEFEPIME HYDROCHLORIDE 2 G: 2 INJECTION, POWDER, FOR SOLUTION INTRAVENOUS at 14:55

## 2021-06-22 RX ADMIN — SODIUM CHLORIDE, POTASSIUM CHLORIDE, SODIUM LACTATE AND CALCIUM CHLORIDE: 600; 310; 30; 20 INJECTION, SOLUTION INTRAVENOUS at 19:08

## 2021-06-22 RX ADMIN — GLIPIZIDE 5 MG: 5 TABLET ORAL at 08:29

## 2021-06-22 RX ADMIN — CEFEPIME HYDROCHLORIDE 2 G: 2 INJECTION, POWDER, FOR SOLUTION INTRAVENOUS at 05:27

## 2021-06-22 RX ADMIN — VANCOMYCIN HYDROCHLORIDE 2000 MG: 10 INJECTION, POWDER, LYOPHILIZED, FOR SOLUTION INTRAVENOUS at 22:20

## 2021-06-22 RX ADMIN — PRAVASTATIN SODIUM 80 MG: 40 TABLET ORAL at 22:02

## 2021-06-22 RX ADMIN — FENTANYL CITRATE 100 MCG: 50 INJECTION, SOLUTION INTRAMUSCULAR; INTRAVENOUS at 19:13

## 2021-06-22 RX ADMIN — SODIUM CHLORIDE 10 ML: 9 INJECTION, SOLUTION INTRAMUSCULAR; INTRAVENOUS; SUBCUTANEOUS at 05:28

## 2021-06-22 RX ADMIN — INSULIN LISPRO 15 UNITS: 100 INJECTION, SOLUTION INTRAVENOUS; SUBCUTANEOUS at 12:25

## 2021-06-22 RX ADMIN — AMLODIPINE BESYLATE 10 MG: 5 TABLET ORAL at 08:29

## 2021-06-22 RX ADMIN — HEPARIN SODIUM 7500 UNITS: 5000 INJECTION INTRAVENOUS; SUBCUTANEOUS at 05:39

## 2021-06-22 RX ADMIN — HEPARIN SODIUM 7500 UNITS: 5000 INJECTION INTRAVENOUS; SUBCUTANEOUS at 01:06

## 2021-06-22 RX ADMIN — METRONIDAZOLE 500 MG: 500 INJECTION, SOLUTION INTRAVENOUS at 01:07

## 2021-06-22 RX ADMIN — SODIUM CHLORIDE 40 MCG/MIN: 900 INJECTION, SOLUTION INTRAVENOUS at 19:25

## 2021-06-22 RX ADMIN — CLOPIDOGREL BISULFATE 75 MG: 75 TABLET ORAL at 08:29

## 2021-06-22 RX ADMIN — ONDANSETRON HYDROCHLORIDE 4 MG: 2 INJECTION, SOLUTION INTRAMUSCULAR; INTRAVENOUS at 19:29

## 2021-06-22 NOTE — PROGRESS NOTES
Pharmacy Antimicrobial Kinetic Dosing    Indication for Antimicrobials: SSTI    Current Regimen of Each Antimicrobial:  Cefepime 1 gm IV q 12h    (Start Date  ; Day # 1 of 5 )  Metronidazole 500 mg IV q 8h  (Start Date  ; Day # 1 of 5 )  Vancomycin = pharmacy to dose    (Start Date  ; Day # 1 of  )    Previous Antimicrobial Therapy:      Goal Level: VANCOMYCIN TROUGH GOAL RANGE    Vancomycin Trough: 10 - 15 mcg/mL    Date Dose & Interval Measured (mcg/mL) Extrapolated (mcg/mL)                       Date & time of next level:           Significant Positive Cultures:       NA    Conditions for Dosing Consideration: None    Labs:  Recent Labs     21  1730   CREA 1.86*   BUN 33*     Recent Labs     21  1841   WBC 14.1*     Temp (24hrs), Av °F (36.7 °C), Min:98 °F (36.7 °C), Max:98 °F (36.7 °C)        Creatinine Clearance (mL/min):   CrCl (Ideal Body Weight): 45.8   If actual weight < IBW: CrCl (Actual Body Weight) 88.7    Impression/Plan:   · Metronidazole adjusted to 500 mg IV  12h per protocol  · Cefepime adjusted to 2 gm IV q 8h per protocol  · Will start Vancomycin 2000 mg IV q 24hrs for trough ~ 14 and AUC estimated at 550  · Antimicrobial stop dates: 5 Day      Pharmacy will follow daily and adjust medications as appropriate for renal function and/or serum levels.     Thank you,  Sherie Dawn, [facility]

## 2021-06-22 NOTE — ED NOTES
2150: wet to dry dressing applied to left foot wound site. Pt reports 3/10 and it is tolerable    2200: tried to call report nobody answered. 2210: Floor Nurse will call back for report. Transport has been set up    Patient is being transferred to [facility] 2 General Surgery, Room # 2123. Report given to Floor RN, RN on Anthony Saenz for routine progression of care. Report consisted of the following information SBAR, Kardex, ED Summary, MAR and Recent Results. Patient transferred to receiving unit by: tech (RN or tech name). Outstanding consults needed: No     Next labs due: No     The following personal items will be sent with the patient during transfer to the floor: All valuables:    Cardiac monitoring ordered: No     The following CURRENT information was reported to the receiving RN:    Code status: Full Code at time of transfer    Last set of vital signs:  Vital Signs  Level of Consciousness: Alert (0) (06/21/21 2154)  Temp: 98.3 °F (36.8 °C) (06/21/21 2154)  Temp Source: Oral (06/21/21 2154)  Pulse (Heart Rate): 70 (06/21/21 2154)  Resp Rate: 18 (06/21/21 2154)  BP: 139/68 (06/21/21 2154)  MAP (Monitor): 84 (06/21/21 2154)  MAP (Calculated): 92 (06/21/21 2154)  MEWS Score: 1 (06/21/21 2154)         Oxygen Therapy  O2 Sat (%): 98 % (06/21/21 2154)  O2 Device: None (Room air) (06/21/21 2154)      Last pain assessment:  Pain 1  Pain Scale 1: Numeric (0 - 10)  Pain Intensity 1: 4  Patient Stated Pain Goal: 0      Wounds: Yes    Urinary catheter: voiding  Is there a williamson order: No     LDAs:       Peripheral IV 06/21/21 Anterior;Proximal;Right Forearm (Active)         Opportunity for questions and clarification was provided.     Johanna Newton

## 2021-06-22 NOTE — ANESTHESIA PREPROCEDURE EVALUATION
Relevant Problems   CARDIOVASCULAR   (+) Iliac artery stenosis, left (HCC)      ENDOCRINE   (+) Obesity, morbid (HCC)   (+) Type 2 diabetes mellitus with hyperglycemia, with long-term current use of insulin (HCC)       Anesthetic History   No history of anesthetic complications            Review of Systems / Medical History  Patient summary reviewed, nursing notes reviewed and pertinent labs reviewed    Pulmonary          Smoker         Neuro/Psych   Within defined limits           Cardiovascular    Hypertension          PAD    Exercise tolerance: >4 METS     GI/Hepatic/Renal         Renal disease: CRI       Endo/Other    Diabetes: poorly controlled, type 2, using insulin    Morbid obesity     Other Findings   Comments: Chronic Lymphedema          Physical Exam    Airway  Mallampati: II  TM Distance: 4 - 6 cm  Neck ROM: normal range of motion   Mouth opening: Normal     Cardiovascular  Regular rate and rhythm,  S1 and S2 normal,  no murmur, click, rub, or gallop             Dental  No notable dental hx       Pulmonary  Breath sounds clear to auscultation               Abdominal  GI exam deferred       Other Findings            Anesthetic Plan    ASA: 3  Anesthesia type: general    Monitoring Plan: BIS      Induction: Intravenous  Anesthetic plan and risks discussed with: Patient

## 2021-06-22 NOTE — PROGRESS NOTES
Received the consult   Scheduled for I&D for this evening at 7 pm  Npo after breakfast and hold the anticoagulants

## 2021-06-22 NOTE — CONSULTS
Vascular Surgery Consult Note  6/22/2021    Subjective: Alfredo Novak is a 63 y.o.  male with a pmhx significant for IDDM, HTN, HLD, tobacco use, and lymphedema. The L leg is worse than the R leg and he has had recurrent ulcers of the LLE. He is s/p L iliac stenting in January of this year. He presented to the clinic yesterday with acute worsening of left leg edema and open wound w/ necrosis to the plantar aspect of the L foot. He was referred for admission and evaluation by podiatry. Plan is for I&D later today with Dr. Venkat Wadsworth. Past Medical History   Insulin Dependent Diabetes Mellitus   Hypertension   Hyperlipidemia   Lymphedema  Recurrent ulcer of the left leg   Tobacco use  Severe obesity  Colon polyps    Past Procedural History   Left iliac vein stenting 01/2021  Testicular cyst excision 2010     Family History   Skin cancer: mother     Social History  Patient is a daily smoker and uses alcohol in moderation. He is a . Prior to Admission medications    Medication Sig Start Date End Date Taking? Authorizing Provider   semaglutide (Ozempic) 0.25 mg/0.2 mL (2 mg/1.5 mL) sub-q pen 0.25 mg by SubCUTAneous route every seven (7) days. 2/19/21   Silverio Ornelas MD   insulin lispro (HumaLOG KwikPen Insulin) 100 unit/mL kwikpen INJECT 20 UNITS SUBCUTANEOUSLY 3 TIMES A DAY WITH MEALS 9/23/20   Silverio Ornelas MD   furosemide (LASIX) 20 mg tablet Take 1 Tab by mouth daily. Indications: visible water retention 8/21/20   Silverio Ornelas MD   semaglutide (Ozempic) 0.25 mg/0.2 mL (2 mg/1.5 mL) sub-q pen 0.25 mg by SubCUTAneous route every seven (7) days. 6/25/20   Silverio Ornelas MD   OneTouch Ultra Blue Test Strip strip CHECK BLOOD SUGAR ONCE DAILY 5/6/20   Provider, Historical   amLODIPine (NORVASC) 10 mg tablet Take 10 mg by mouth daily. 12/20/18   Provider, Historical   atenolol (TENORMIN) 100 mg tablet Take 100 mg by mouth daily.  1/30/19   Provider, Historical   enalapril (VASOTEC) 20 mg tablet Take 20 mg by mouth daily. 1/15/19   Provider, Historical   glipiZIDE SR (GLUCOTROL XL) 10 mg CR tablet Take 10 mg by mouth two (2) times a day. 12/20/18   Provider, Historical   hydroCHLOROthiazide (HYDRODIURIL) 25 mg tablet take 1 tablet by mouth once daily 1/30/19   Provider, Historical   BASAGLAR KWIKPEN U-100 INSULIN 100 unit/mL (3 mL) inpn 74 Units by SubCUTAneous route daily. 12/5/18   Provider, Historical   metFORMIN (GLUCOPHAGE) 1,000 mg tablet 1,000 mg two (2) times a day. 12/20/18   Provider, Historical   pravastatin (PRAVACHOL) 80 mg tablet Take 80 mg by mouth nightly. 1/15/19   Provider, Historical     No Known Allergies     Review of Systems   Constitutional: Negative for activity change, chills, fatigue and fever. HENT: Negative for congestion. Eyes: Negative for visual disturbance. Respiratory: Positive for apnea (Likely RADHAMES. Snores heavily). Negative for cough and shortness of breath. Cardiovascular: Positive for leg swelling (Left > Right ). Negative for chest pain. Gastrointestinal: Negative for abdominal pain, nausea and vomiting. Endocrine: Negative for polydipsia and polyuria. Genitourinary: Negative. Musculoskeletal: Positive for gait problem. Negative for myalgias. Skin: Positive for color change and wound. Allergic/Immunologic: Negative for immunocompromised state. Neurological: Negative for weakness. Hematological: Negative. Psychiatric/Behavioral: Negative. Objective:       Patient Vitals for the past 24 hrs:   BP Temp Pulse Resp SpO2 Height Weight   06/22/21 0100 (!) 147/74 98.6 °F (37 °C) 75 18 98 %     06/21/21 2154 139/68 98.3 °F (36.8 °C) 70 18 98 %     06/21/21 1720 (!) 148/61 98 °F (36.7 °C) 82 16 98 % 6' (1.829 m) 150.3 kg (331 lb 5.6 oz)     Physical Exam  Constitutional:       Comments: Sleeping. Snoring loudly. Easily aroused. HENT:      Head: Normocephalic.       Nose: Nose normal.      Mouth/Throat:      Mouth: Mucous membranes are moist.   Eyes:      Pupils: Pupils are equal, round, and reactive to light. Cardiovascular:      Rate and Rhythm: Normal rate and regular rhythm. Pulmonary:      Effort: Pulmonary effort is normal. No respiratory distress. Chest:      Comments: Feet are warm and well perfused w/ palpable DP pulses  Severe edema of the L leg and moderate edema of the R leg. Abdominal:      General: Abdomen is flat. There is no distension. Musculoskeletal:         General: Normal range of motion. Cervical back: Normal range of motion. Skin:     General: Skin is warm. Comments: Wound with necrotic changes to the L foot. Neurological:      General: No focal deficit present. Mental Status: Mental status is at baseline. Psychiatric:         Mood and Affect: Mood normal.         Behavior: Behavior is uncooperative. Pertinent Test Results:   Recent Results (from the past 24 hour(s))   CRP, HIGH SENSITIVITY    Collection Time: 06/21/21  5:30 PM   Result Value Ref Range    CRP, High sensitivity >2.1 mg/L   METABOLIC PANEL, COMPREHENSIVE    Collection Time: 06/21/21  5:30 PM   Result Value Ref Range    Sodium 138 136 - 145 mmol/L    Potassium 4.5 3.5 - 5.1 mmol/L    Chloride 104 97 - 108 mmol/L    CO2 31 21 - 32 mmol/L    Anion gap 3 (L) 5 - 15 mmol/L    Glucose 154 (H) 65 - 100 mg/dL    BUN 33 (H) 6 - 20 MG/DL    Creatinine 1.86 (H) 0.70 - 1.30 MG/DL    BUN/Creatinine ratio 18 12 - 20      GFR est AA 45 (L) >60 ml/min/1.73m2    GFR est non-AA 37 (L) >60 ml/min/1.73m2    Calcium 9.1 8.5 - 10.1 MG/DL    Bilirubin, total 0.2 0.2 - 1.0 MG/DL    ALT (SGPT) 12 12 - 78 U/L    AST (SGOT) 10 (L) 15 - 37 U/L    Alk.  phosphatase 102 45 - 117 U/L    Protein, total 6.9 6.4 - 8.2 g/dL    Albumin 2.2 (L) 3.5 - 5.0 g/dL    Globulin 4.7 (H) 2.0 - 4.0 g/dL    A-G Ratio 0.5 (L) 1.1 - 2.2     CBC WITH AUTOMATED DIFF    Collection Time: 06/21/21  6:41 PM   Result Value Ref Range    WBC 14.1 (H) 4.1 - 11.1 K/uL RBC 4.43 4.10 - 5.70 M/uL    HGB 12.7 12.1 - 17.0 g/dL    HCT 38.8 36.6 - 50.3 %    MCV 87.6 80.0 - 99.0 FL    MCH 28.7 26.0 - 34.0 PG    MCHC 32.7 30.0 - 36.5 g/dL    RDW 13.1 11.5 - 14.5 %    PLATELET 757 110 - 279 K/uL    MPV 9.8 8.9 - 12.9 FL    NRBC 0.0 0  WBC    ABSOLUTE NRBC 0.00 0.00 - 0.01 K/uL    NEUTROPHILS 76 (H) 32 - 75 %    LYMPHOCYTES 13 12 - 49 %    MONOCYTES 7 5 - 13 %    EOSINOPHILS 3 0 - 7 %    BASOPHILS 0 0 - 1 %    IMMATURE GRANULOCYTES 1 (H) 0.0 - 0.5 %    ABS. NEUTROPHILS 10.5 (H) 1.8 - 8.0 K/UL    ABS. LYMPHOCYTES 1.9 0.8 - 3.5 K/UL    ABS. MONOCYTES 1.0 0.0 - 1.0 K/UL    ABS. EOSINOPHILS 0.5 (H) 0.0 - 0.4 K/UL    ABS. BASOPHILS 0.1 0.0 - 0.1 K/UL    ABS. IMM.  GRANS. 0.1 (H) 0.00 - 0.04 K/UL    DF AUTOMATED     SED RATE (ESR)    Collection Time: 06/21/21  6:41 PM   Result Value Ref Range    Sed rate, automated 92 (H) 0 - 20 mm/hr   CBC W/O DIFF    Collection Time: 06/22/21 12:38 AM   Result Value Ref Range    WBC 11.6 (H) 4.1 - 11.1 K/uL    RBC 3.87 (L) 4.10 - 5.70 M/uL    HGB 11.0 (L) 12.1 - 17.0 g/dL    HCT 34.5 (L) 36.6 - 50.3 %    MCV 89.1 80.0 - 99.0 FL    MCH 28.4 26.0 - 34.0 PG    MCHC 31.9 30.0 - 36.5 g/dL    RDW 12.8 11.5 - 14.5 %    PLATELET 984 176 - 621 K/uL    MPV 10.0 8.9 - 12.9 FL    NRBC 0.0 0  WBC    ABSOLUTE NRBC 0.00 0.00 - 8.34 K/uL   METABOLIC PANEL, BASIC    Collection Time: 06/22/21 12:38 AM   Result Value Ref Range    Sodium 136 136 - 145 mmol/L    Potassium 3.9 3.5 - 5.1 mmol/L    Chloride 105 97 - 108 mmol/L    CO2 28 21 - 32 mmol/L    Anion gap 3 (L) 5 - 15 mmol/L    Glucose 128 (H) 65 - 100 mg/dL    BUN 32 (H) 6 - 20 MG/DL    Creatinine 1.63 (H) 0.70 - 1.30 MG/DL    BUN/Creatinine ratio 20 12 - 20      GFR est AA 52 (L) >60 ml/min/1.73m2    GFR est non-AA 43 (L) >60 ml/min/1.73m2    Calcium 8.7 8.5 - 10.1 MG/DL   GLUCOSE, POC    Collection Time: 06/22/21 12:46 AM   Result Value Ref Range    Glucose (POC) 132 (H) 65 - 117 mg/dL    Performed by Damian Cuevas RN        Assessmen/Plan:     Ulceration of left foot  -WBC count improving this am (did not meet SIRs on arrival)  Foreign body L 5th metatarsal head  Charcot arthropathy L foot   Severe distal left leg swelling   Lymphedema  -exacerbated by severe obesity  -participates in lymphedema clinic    Veinous incompetence  -s/p left CIV stenting 01/2021. · No role for vascular procedural intervention. Leg elevation and compression when able to tolerate. Wound care and debridement per podiatry. Anitbxs per primary team.  Patient is at high risk for amputation. SHELBY vs CKD stage III  Insulin Dependent Diabetes Mellitus   -mild hyperglycemia: improving  Hypertension  -stable on CCB and BBlocker  Hyperlipidemia   -statin  Tobacco use  -smoking cessation edu     Management of comorbid conditions by primary team.    VTE Prophylaxis:  SCDs  Hold SQ for procedure    Disposition:  Likely home with [facility]     Vascular surgery signing off. We appreciate the opportunity to participate in the care of Mr. Kurt Leigh. Please reconsult as needed.         Signed By: Max Salcedo NP     June 22, 2021

## 2021-06-22 NOTE — WOUND CARE
Wound Care nurse consult: consult placed for POAleft plantar diabetic wound. Dr Rosa Kwon saw patient this am and plans to take him to OR this evening for I&D of this wound. Defer treatment to podiatry.   Robby Ro RN, [employer]

## 2021-06-23 LAB
ANION GAP SERPL CALC-SCNC: 6 MMOL/L (ref 5–15)
BASOPHILS # BLD: 0 K/UL (ref 0–0.1)
BASOPHILS NFR BLD: 0 % (ref 0–1)
BUN SERPL-MCNC: 34 MG/DL (ref 6–20)
BUN/CREAT SERPL: 20 (ref 12–20)
CALCIUM SERPL-MCNC: 8.7 MG/DL (ref 8.5–10.1)
CHLORIDE SERPL-SCNC: 104 MMOL/L (ref 97–108)
CO2 SERPL-SCNC: 25 MMOL/L (ref 21–32)
CREAT SERPL-MCNC: 1.74 MG/DL (ref 0.7–1.3)
DIFFERENTIAL METHOD BLD: ABNORMAL
EOSINOPHIL # BLD: 0 K/UL (ref 0–0.4)
EOSINOPHIL NFR BLD: 0 % (ref 0–7)
ERYTHROCYTE [DISTWIDTH] IN BLOOD BY AUTOMATED COUNT: 12.8 % (ref 11.5–14.5)
GLUCOSE BLD STRIP.AUTO-MCNC: 275 MG/DL (ref 65–117)
GLUCOSE BLD STRIP.AUTO-MCNC: 368 MG/DL (ref 65–117)
GLUCOSE BLD STRIP.AUTO-MCNC: 390 MG/DL (ref 65–117)
GLUCOSE BLD STRIP.AUTO-MCNC: 415 MG/DL (ref 65–117)
GLUCOSE SERPL-MCNC: 268 MG/DL (ref 65–100)
HCT VFR BLD AUTO: 37.6 % (ref 36.6–50.3)
HGB BLD-MCNC: 12.2 G/DL (ref 12.1–17)
IMM GRANULOCYTES # BLD AUTO: 0.1 K/UL (ref 0–0.04)
IMM GRANULOCYTES NFR BLD AUTO: 1 % (ref 0–0.5)
LYMPHOCYTES # BLD: 0.7 K/UL (ref 0.8–3.5)
LYMPHOCYTES NFR BLD: 5 % (ref 12–49)
MCH RBC QN AUTO: 28.4 PG (ref 26–34)
MCHC RBC AUTO-ENTMCNC: 32.4 G/DL (ref 30–36.5)
MCV RBC AUTO: 87.6 FL (ref 80–99)
MONOCYTES # BLD: 0.1 K/UL (ref 0–1)
MONOCYTES NFR BLD: 1 % (ref 5–13)
NEUTS SEG # BLD: 13.7 K/UL (ref 1.8–8)
NEUTS SEG NFR BLD: 93 % (ref 32–75)
NRBC # BLD: 0 K/UL (ref 0–0.01)
NRBC BLD-RTO: 0 PER 100 WBC
PLATELET # BLD AUTO: 276 K/UL (ref 150–400)
PMV BLD AUTO: 9.6 FL (ref 8.9–12.9)
POTASSIUM SERPL-SCNC: 5.1 MMOL/L (ref 3.5–5.1)
RBC # BLD AUTO: 4.29 M/UL (ref 4.1–5.7)
RBC MORPH BLD: ABNORMAL
SERVICE CMNT-IMP: ABNORMAL
SODIUM SERPL-SCNC: 135 MMOL/L (ref 136–145)
WBC # BLD AUTO: 14.6 K/UL (ref 4.1–11.1)

## 2021-06-23 PROCEDURE — 94760 N-INVAS EAR/PLS OXIMETRY 1: CPT

## 2021-06-23 PROCEDURE — 80202 ASSAY OF VANCOMYCIN: CPT

## 2021-06-23 PROCEDURE — 74011636637 HC RX REV CODE- 636/637: Performed by: NURSE PRACTITIONER

## 2021-06-23 PROCEDURE — 36415 COLL VENOUS BLD VENIPUNCTURE: CPT

## 2021-06-23 PROCEDURE — 77010033678 HC OXYGEN DAILY

## 2021-06-23 PROCEDURE — 82962 GLUCOSE BLOOD TEST: CPT

## 2021-06-23 PROCEDURE — 74011000250 HC RX REV CODE- 250: Performed by: PODIATRIST

## 2021-06-23 PROCEDURE — 74011000258 HC RX REV CODE- 258: Performed by: PODIATRIST

## 2021-06-23 PROCEDURE — 74011250637 HC RX REV CODE- 250/637: Performed by: PODIATRIST

## 2021-06-23 PROCEDURE — 80048 BASIC METABOLIC PNL TOTAL CA: CPT

## 2021-06-23 PROCEDURE — 74011636637 HC RX REV CODE- 636/637: Performed by: PODIATRIST

## 2021-06-23 PROCEDURE — 65270000029 HC RM PRIVATE

## 2021-06-23 PROCEDURE — 74011250636 HC RX REV CODE- 250/636: Performed by: PODIATRIST

## 2021-06-23 PROCEDURE — 85025 COMPLETE CBC W/AUTO DIFF WBC: CPT

## 2021-06-23 RX ORDER — INSULIN LISPRO 100 [IU]/ML
3 INJECTION, SOLUTION INTRAVENOUS; SUBCUTANEOUS ONCE
Status: COMPLETED | OUTPATIENT
Start: 2021-06-23 | End: 2021-06-23

## 2021-06-23 RX ADMIN — ATENOLOL 100 MG: 50 TABLET ORAL at 10:27

## 2021-06-23 RX ADMIN — CEFEPIME HYDROCHLORIDE 2 G: 2 INJECTION, POWDER, FOR SOLUTION INTRAVENOUS at 22:40

## 2021-06-23 RX ADMIN — INSULIN LISPRO 15 UNITS: 100 INJECTION, SOLUTION INTRAVENOUS; SUBCUTANEOUS at 13:06

## 2021-06-23 RX ADMIN — CEFEPIME HYDROCHLORIDE 2 G: 2 INJECTION, POWDER, FOR SOLUTION INTRAVENOUS at 13:05

## 2021-06-23 RX ADMIN — INSULIN GLARGINE 74 UNITS: 100 INJECTION, SOLUTION SUBCUTANEOUS at 22:39

## 2021-06-23 RX ADMIN — METRONIDAZOLE 500 MG: 500 INJECTION, SOLUTION INTRAVENOUS at 22:40

## 2021-06-23 RX ADMIN — INSULIN LISPRO 3 UNITS: 100 INJECTION, SOLUTION INTRAVENOUS; SUBCUTANEOUS at 22:52

## 2021-06-23 RX ADMIN — SODIUM CHLORIDE 10 ML: 9 INJECTION, SOLUTION INTRAMUSCULAR; INTRAVENOUS; SUBCUTANEOUS at 14:59

## 2021-06-23 RX ADMIN — GLIPIZIDE 5 MG: 5 TABLET ORAL at 16:57

## 2021-06-23 RX ADMIN — GLIPIZIDE 5 MG: 5 TABLET ORAL at 10:27

## 2021-06-23 RX ADMIN — VANCOMYCIN HYDROCHLORIDE 2000 MG: 10 INJECTION, POWDER, LYOPHILIZED, FOR SOLUTION INTRAVENOUS at 23:40

## 2021-06-23 RX ADMIN — PRAVASTATIN SODIUM 80 MG: 40 TABLET ORAL at 22:39

## 2021-06-23 RX ADMIN — METRONIDAZOLE 500 MG: 500 INJECTION, SOLUTION INTRAVENOUS at 10:33

## 2021-06-23 RX ADMIN — SODIUM CHLORIDE 10 ML: 9 INJECTION, SOLUTION INTRAMUSCULAR; INTRAVENOUS; SUBCUTANEOUS at 05:57

## 2021-06-23 RX ADMIN — INSULIN LISPRO 4 UNITS: 100 INJECTION, SOLUTION INTRAVENOUS; SUBCUTANEOUS at 10:32

## 2021-06-23 RX ADMIN — INSULIN LISPRO 4 UNITS: 100 INJECTION, SOLUTION INTRAVENOUS; SUBCUTANEOUS at 13:06

## 2021-06-23 RX ADMIN — INSULIN LISPRO 3 UNITS: 100 INJECTION, SOLUTION INTRAVENOUS; SUBCUTANEOUS at 22:53

## 2021-06-23 RX ADMIN — INSULIN LISPRO 15 UNITS: 100 INJECTION, SOLUTION INTRAVENOUS; SUBCUTANEOUS at 10:32

## 2021-06-23 RX ADMIN — ASPIRIN 81 MG: 81 TABLET, CHEWABLE ORAL at 10:27

## 2021-06-23 RX ADMIN — CLOPIDOGREL BISULFATE 75 MG: 75 TABLET ORAL at 10:27

## 2021-06-23 RX ADMIN — SODIUM CHLORIDE 10 ML: 9 INJECTION, SOLUTION INTRAMUSCULAR; INTRAVENOUS; SUBCUTANEOUS at 22:42

## 2021-06-23 RX ADMIN — INSULIN LISPRO 15 UNITS: 100 INJECTION, SOLUTION INTRAVENOUS; SUBCUTANEOUS at 16:57

## 2021-06-23 RX ADMIN — AMLODIPINE BESYLATE 10 MG: 5 TABLET ORAL at 10:27

## 2021-06-23 RX ADMIN — INSULIN LISPRO 3 UNITS: 100 INJECTION, SOLUTION INTRAVENOUS; SUBCUTANEOUS at 16:58

## 2021-06-23 NOTE — PERIOP NOTES
TRANSFER - IN REPORT:    Verbal report received arnel kathleen Kay  being received [id number] for routine post - op      Report consisted of patients Situation, Background, Assessment and   Recommendations(SBAR). Information from the following report(s) OR Summary was reviewed with the receiving nurse. Opportunity for questions and clarification was provided. Assessment completed upon patients arrival to unit and care assumed.

## 2021-06-23 NOTE — PROGRESS NOTES
Hospitalist Progress Note    NAME: Kleber Julien   :  1960   MRN:  [id number]       Assessment / Plan:  Left foot cellulitis POA  With chronic left foot diabetic ulcer-nonhealing POA  -appreciate podiatry and vascular surgery consults  -plan for Lt foot  I&D this evening  by podiatry  -cont IV abx with cefepime, vanc, flagyl for now  -await surgical cultures, adjust abx based on cultures  -wound care  -pain control  -no role for vascular surg intervention now, high risk for amputation  -CRP 9.5  -WBC 14.1 POA--> 11.6  -Afebrile    MRI Lt fot :  1. Soft tissue wound of the plantar aspect of the midfoot with an underlying  fluid collection extending medially in the subcutaneous tissues. 2. Nonspecific extensive diffuse subcutaneous edema. 3. Small metallic foreign body adjacent to the fifth metatarsal head and  subcutaneous tissues. This is not adjacent to the wound. 4. Severe degenerative changes in the midfoot with edema likely related to  Charcot arthropathy. Septic arthritis can have a similar appearance, but is  unlikely given that the fluid collection does not extend to the osseous  structures of the midfoot and there is no significant joint effusion. DM2 with hyperglycemia  -cont glipizide at reduced dose 5mg bid, hold metformin  -cont lantus 74u qhs and SSI with POCs  -last A1C 5.9 21  -holding home Ozempic q.  Weekly    SHELBY vs CKD POA  -Cr 1.8 on admission, no prior Cr on file  -diabetic nephropathy?  -repeat labs with Cr 1.63, cont to monitor, renally dose meds  -OP follow up with nephrology, consider inpatient consult if renal function worsens    Chronic Lymphedema of both lower extremities POA   -appreciate wound care team following  -follows OP in lymphedema clinic    Left ilac artery stenosis  -s/p stent 2021 per patient    Hypertension  -cont BB, CCB  -hold vasotec, HCTZ , monitor BP    Tobacco abuse disorder POA    -X-ray left foot= chronic plantar wound/midfoot with diffuse soft tissue swelling  -CRP >9.5        Morbid Obesity POA  Weight loss recommended        Code Status: Full code as per patient's wishes  Surrogate Decision Maker: Ramana Webb (significant other) as per patient     DVT Prophylaxis: Subcu heparin  GI Prophylaxis: not indicated     Baseline: Patient lives with significant other at home, independent with ADLs, has been dealing with chronic lymphedema of both legs     Subjective:     Chief Complaint / Reason for Physician Visit  C/o nonhealing foot ulcer, + Lt foot pain    Discussed with RN events overnight. Review of Systems:  Symptom Y/N Comments  Symptom Y/N Comments   Fever/Chills n   Chest Pain n    Poor Appetite n   Edema y    Cough n   Abdominal Pain n    Sputum n   Joint Pain     SOB/FELIX n   Pruritis/Rash     Nausea/vomit n   Tolerating PT/OT     Diarrhea    Tolerating Diet     Constipation    Other       Could NOT obtain due to:      Objective:     VITALS:   Last 24hrs VS reviewed since prior progress note. Most recent are:  Patient Vitals for the past 24 hrs:   Temp Pulse Resp BP SpO2   06/22/21 2100  72 19 (!) 142/68 96 %   06/22/21 2050 98.5 °F (36.9 °C) 72 21 135/68 97 %   06/22/21 2045  74 19 133/66 96 %   06/22/21 2040  74 21 138/65 96 %   06/22/21 2035  75 22 137/64 95 %   06/22/21 2030  76 22 132/70 96 %   06/22/21 2025  80 12 (!) 114/44 95 %   06/22/21 2023 98.5 °F (36.9 °C) 79 24 (!) 118/44 93 %   06/22/21 1530 98.3 °F (36.8 °C) 77 18 (!) 151/65 93 %   06/22/21 1258 98 °F (36.7 °C) 73 18 (!) 147/69 95 %   06/22/21 0928 98.5 °F (36.9 °C) 78 18 139/61 92 %   06/22/21 0100 98.6 °F (37 °C) 75 18 (!) 147/74 98 %       Intake/Output Summary (Last 24 hours) at 6/22/2021 2340  Last data filed at 6/22/2021 2024  Gross per 24 hour   Intake 500 ml   Output 1010 ml   Net -510 ml        I had a face to face encounter and independently examined this patient on 6/22/2021, as outlined below:  PHYSICAL EXAM:  General: WD, WN.  Alert, cooperative, no acute distress    EENT:  EOMI. Anicteric sclerae. MMM  Resp:  CTA bilaterally, no wheezing or rales. No accessory muscle use  CV:  Regular  rhythm,  No edema  GI:  Soft, Non distended, Non tender. +Bowel sounds  Neurologic:  Alert and oriented X 3, normal speech,   Psych:   Good insight. Not anxious nor agitated  Skin:  No rashes. No jaundice. +Lt foot plantar ulcer    Reviewed most current lab test results and cultures  YES  Reviewed most current radiology test results   YES  Review and summation of old records today    NO  Reviewed patient's current orders and MAR    YES  PMH/SH reviewed - no change compared to H&P  ________________________________________________________________________  Care Plan discussed with:    Comments   Patient x    Family      RN x    Care Manager     Consultant                       x Multidiciplinary team rounds were held today with , nursing, pharmacist and clinical coordinator. Patient's plan of care was discussed; medications were reviewed and discharge planning was addressed. ________________________________________________________________________  Total NON critical care TIME:  35  Minutes    Total CRITICAL CARE TIME Spent:   Minutes non procedure based      Comments   >50% of visit spent in counseling and coordination of care x    ________________________________________________________________________  Aston Gutierrez DO     Procedures: see electronic medical records for all procedures/Xrays and details which were not copied into this note but were reviewed prior to creation of Plan. LABS:  I reviewed today's most current labs and imaging studies.   Pertinent labs include:  Recent Labs     06/22/21  0038 06/21/21  1841   WBC 11.6* 14.1*   HGB 11.0* 12.7   HCT 34.5* 38.8    281     Recent Labs     06/22/21  0038 06/21/21  1730    138   K 3.9 4.5    104   CO2 28 31   * 154*   BUN 32* 33*   CREA 1.63* 1.86*   CA 8.7 9.1   ALB  -- 2.2*   TBILI  --  0.2   ALT  --  12       Signed: Gail Baldwin, DO

## 2021-06-23 NOTE — ANESTHESIA POSTPROCEDURE EVALUATION
Procedure(s):  INCISION AND DRAINAGE LEFT FOOT. general    Anesthesia Post Evaluation        Patient location during evaluation: PACU  Note status: Adequate. Level of consciousness: responsive to verbal stimuli and sleepy but conscious  Pain management: satisfactory to patient  Airway patency: patent  Anesthetic complications: no  Cardiovascular status: acceptable  Respiratory status: acceptable  Hydration status: acceptable  Comments: +Post-Anesthesia Evaluation and Assessment    Patient: Shea Link MRN: [id number]  SSN: [id number]   YOB: 1960  Age: 63 y.o. Sex: male      Cardiovascular Function/Vital Signs    BP (!) 142/68   Pulse 72   Temp 36.9 °C (98.5 °F)   Resp 19   Ht 6' (1.829 m)   Wt 150.3 kg (331 lb 5.6 oz)   SpO2 96%   BMI 44.94 kg/m²     Patient is status post Procedure(s):  INCISION AND DRAINAGE LEFT FOOT. Nausea/Vomiting: Controlled. Postoperative hydration reviewed and adequate. Pain:  Pain Scale 1: FLACC (06/22/21 2050)  Pain Intensity 1: 0 (06/22/21 2050)   Managed. Neurological Status:   Neuro (WDL): Exceptions to WDL (06/22/21 2050)   At baseline. Mental Status and Level of Consciousness: Arousable. Pulmonary Status:   O2 Device: Nasal cannula (06/22/21 2050)   Adequate oxygenation and airway patent. Complications related to anesthesia: None    Post-anesthesia assessment completed. No concerns. Signed By: Jimenez Martinez MD    6/22/2021  Post anesthesia nausea and vomiting:  controlled      INITIAL Post-op Vital signs:   Vitals Value Taken Time   /72 06/22/21 2115   Temp 36.9 °C (98.5 °F) 06/22/21 2050   Pulse 69 06/22/21 2121   Resp 16 06/22/21 2121   SpO2 97 % 06/22/21 2121   Vitals shown include unvalidated device data.

## 2021-06-23 NOTE — PERIOP NOTES
TRANSFER - OUT REPORT:    Verbal report given Breonna kathleen Puentes  being transferred [id number] for routine post - op       Report consisted of patients Situation, Background, Assessment and   Recommendations(SBAR). Information from the following report(s) SBAR was reviewed with the receiving nurse. Opportunity for questions and clarification was provided.       Patient transported with:   Monitor  O2 @ 2 liters  Registered Nurse

## 2021-06-23 NOTE — PERIOP NOTES
8:00 PM    = 1 Bottle of IRRISEPT for PRN Irrigation used by MD Laury Jin during Pt. Procedure.    + REF. #: [id number]  + LOT. #: [id number]  + EXP.  Date: 02/28/2023 OB

## 2021-06-23 NOTE — CONSULTS
Podiatry History and Physical    Subjective:         Date of Consultation:  June 23, 2021    Referring Physician: Chiara Mason. [address]    Patient is a 63 y.o. male who is being seen for Infected diabetic foot left with Charcot joint. Workup has revealed edematous left foot with erythema extending on the medial mid foot tracking into the plantar ulceration and possible large abscess on the medial heel and mod foot. Unable to assess the pedal pulses due to severe edema and lymphedema bilateral. Cap refill is good as well as normal elly's and no issues with perfusion as per Dr. Tessa Gomez. No osteo on the xray or MRI. Spoke to the patient and his wife for surgical intervention and they have agreed for the treatment plan. He has never seen a Podiatrist, the wound has been taken care by him and his wife. Patient Active Problem List    Diagnosis Date Noted    Cellulitis of foot, left 06/21/2021    Non-healing ulcer of left foot ([address] 06/21/2021    Chronic ulcer of left foot due to diabetes mellitus ([address] 06/21/2021    Lymphedema of both lower extremities 06/21/2021    Iliac artery stenosis, left ([address] 06/21/2021    Type 2 diabetes mellitus with hyperglycemia, with long-term current use of insulin ([address] 03/18/2019    Obesity, morbid ([address] 02/14/2019     History reviewed. No pertinent past medical history. History reviewed. No pertinent family history. Social History     Tobacco Use    Smoking status: Former Smoker    Smokeless tobacco: Never Used   Substance Use Topics    Alcohol use: Not on file     History reviewed. No pertinent surgical history. Prior to Admission medications    Medication Sig Start Date End Date Taking? Authorizing Provider   semaglutide (Ozempic) 0.25 mg/0.2 mL (2 mg/1.5 mL) sub-q pen 0.25 mg by SubCUTAneous route every seven (7) days.  2/19/21   Grace Hart MD   insulin lispro (HumaLOG KwikPen Insulin) 100 unit/mL kwikpen INJECT 20 UNITS SUBCUTANEOUSLY 3 TIMES A DAY WITH MEALS 9/23/20 Tatiana Franks MD   furosemide (LASIX) 20 mg tablet Take 1 Tab by mouth daily. Indications: visible water retention 20   Tatiana Franks MD   semaglutide (Ozempic) 0.25 mg/0.2 mL (2 mg/1.5 mL) sub-q pen 0.25 mg by SubCUTAneous route every seven (7) days. 20   Tatiana Franks MD   OneTouch Ultra Blue Test Strip strip CHECK BLOOD SUGAR ONCE DAILY 20   Provider, Historical   amLODIPine (NORVASC) 10 mg tablet Take 10 mg by mouth daily. 18   Provider, Historical   atenolol (TENORMIN) 100 mg tablet Take 100 mg by mouth daily. 19   Provider, Historical   enalapril (VASOTEC) 20 mg tablet Take 20 mg by mouth daily. 1/15/19   Provider, Historical   glipiZIDE SR (GLUCOTROL XL) 10 mg CR tablet Take 10 mg by mouth two (2) times a day. 18   Provider, Historical   hydroCHLOROthiazide (HYDRODIURIL) 25 mg tablet take 1 tablet by mouth once daily 19   Provider, Historical   BASAGLAR KWIKPEN U-100 INSULIN 100 unit/mL (3 mL) inpn 74 Units by SubCUTAneous route daily. 18   Provider, Historical   metFORMIN (GLUCOPHAGE) 1,000 mg tablet 1,000 mg two (2) times a day. 18   Provider, Historical   pravastatin (PRAVACHOL) 80 mg tablet Take 80 mg by mouth nightly. 1/15/19   Provider, Historical     No Known Allergies     Review of Systems:  Pertinent items are noted in HPI. Objective:     Patient Vitals for the past 8 hrs:   BP Temp Pulse Resp SpO2   21 0906 (!) 160/90 97.8 °F (36.6 °C) 92 16 94 %   21 0444 119/67 98 °F (36.7 °C) 81 16 93 %     Temp (24hrs), Av.2 °F (36.8 °C), Min:97.8 °F (36.6 °C), Max:98.5 °F (36.9 °C)      General:  alert, cooperative, no distress, appears stated age  Severe Lymphedema and obesity  Ankle edematous with no erythema extension beyond medial midfoot  reduced sensation at left foot, trophic changes left foot and leg and ulceration at plantar foot full thickness dry with gauze packing  No active drainage or odor.  Severe edema tracking erythema into the mid heel and mod foot. Evidence midfoot collapse due to Charcot foot     Data Review:   Recent Results (from the past 24 hour(s))   GLUCOSE, POC    Collection Time: 06/22/21 11:53 AM   Result Value Ref Range    Glucose (POC) 230 (H) 65 - 117 mg/dL    Performed by [facility] (JOSEPHINE RN)    GLUCOSE, POC    Collection Time: 06/22/21  4:30 PM   Result Value Ref Range    Glucose (POC) 100 65 - 117 mg/dL    Performed by [facility] (TRJOSEY RN)    GLUCOSE, POC    Collection Time: 06/22/21  8:51 PM   Result Value Ref Range    Glucose (POC) 106 65 - 117 mg/dL    Performed by [employer]    Collection Time: 06/23/21  3:20 AM   Result Value Ref Range    Sodium 135 (L) 136 - 145 mmol/L    Potassium 5.1 3.5 - 5.1 mmol/L    Chloride 104 97 - 108 mmol/L    CO2 25 21 - 32 mmol/L    Anion gap 6 5 - 15 mmol/L    Glucose 268 (H) 65 - 100 mg/dL    BUN 34 (H) 6 - 20 MG/DL    Creatinine 1.74 (H) 0.70 - 1.30 MG/DL    BUN/Creatinine ratio 20 12 - 20      GFR est AA 49 (L) >60 ml/min/1.73m2    GFR est non-AA 40 (L) >60 ml/min/1.73m2    Calcium 8.7 8.5 - 10.1 MG/DL   CBC WITH AUTOMATED DIFF    Collection Time: 06/23/21  3:20 AM   Result Value Ref Range    WBC 14.6 (H) 4.1 - 11.1 K/uL    RBC 4.29 4. 10 - 5.70 M/uL    HGB 12.2 12.1 - 17.0 g/dL    HCT 37.6 36.6 - 50.3 %    MCV 87.6 80.0 - 99.0 FL    MCH 28.4 26.0 - 34.0 PG    MCHC 32.4 30.0 - 36.5 g/dL    RDW 12.8 11.5 - 14.5 %    PLATELET 983 778 - 621 K/uL    MPV 9.6 8.9 - 12.9 FL    NRBC 0.0 0  WBC    ABSOLUTE NRBC 0.00 0.00 - 0.01 K/uL    NEUTROPHILS 93 (H) 32 - 75 %    LYMPHOCYTES 5 (L) 12 - 49 %    MONOCYTES 1 (L) 5 - 13 %    EOSINOPHILS 0 0 - 7 %    BASOPHILS 0 0 - 1 %    IMMATURE GRANULOCYTES 1 (H) 0.0 - 0.5 %    ABS. NEUTROPHILS 13.7 (H) 1.8 - 8.0 K/UL    ABS. LYMPHOCYTES 0.7 (L) 0.8 - 3.5 K/UL    ABS. MONOCYTES 0.1 0.0 - 1.0 K/UL    ABS. EOSINOPHILS 0.0 0.0 - 0.4 K/UL    ABS. BASOPHILS 0.0 0.0 - 0.1 K/UL    ABS. IMM.  GRANS. 0.1 (H) 0.00 - 0.04 K/UL DF SMEAR SCANNED      RBC COMMENTS NORMOCYTIC, NORMOCHROMIC       Addendum    Addendum: There is a 7 mm thin metallic foreign body in the soft tissues  adjacent to the fifth metatarsal head. This is not adjacent to the wound. Addended by Rhoda Bennett MD on 6/22/2021  8:22 AM      Study Result    Addenda   Addendum: There is a 7 mm thin metallic foreign body in the soft tissues  adjacent to the fifth metatarsal head. This is not adjacent to the wound. Signed by Rhoda Bennett MD on 6/22/2021  8:22 AM   Narrative & Impression   EXAM: XR FOOT LT MIN 3 V     INDICATION: wound on bottom of foot.     COMPARISON: None.     FINDINGS: Three views of the left foot. There is chronic widening of the  Lisfranc interval with shift of the metatarsals laterally. . Severe degenerative  changes are seen throughout the midfoot. There is a plantar calcaneal  enthesophyte. No acute fracture or dislocation. No definite evidence of  osteomyelitis. There is a soft tissue wound of the plantar aspect of the  midfoot. There is diffuse soft tissue swelling of the visualized extremity.     IMPRESSION  1. Soft tissue wound of the plantar aspect of the midfoot. 2. Charcot arthropathy in the midfoot. 3. Diffuse soft tissue swelling of the visualized extremity. Narrative & Impression        1. Severe degenerative changes in the midfoot related to Charcot arthropathy. Edema within the bones is likely related to this. 2. Soft tissue wound of the plantar aspect of the midfoot. There is a fluid  collection that extends medially along undersurface of the foot in the  subcutaneous tissues.  This measures 3.7 x 1.4 x 4.5 cm and is seen on series 7  image 25 of series 10 image 7.  3. Diffuse subcutaneous edema.     Preliminary report was provided by Dr. Leoncio Eagle, the on-call radiologist, at 9:55 PM  on 6/21/2021     Final report to follow.          EXAM:  MRI FOOT LT W WO CONT     INDICATION:  Foot wound severe degenerative changes are seen throughout the  midfoot with associated edema.     COMPARISON: Radiographs 6/21/2021     TECHNIQUE: Axial, coronal, and sagittal MRI of the left midfoot in the T1, T2,  and inversion-recovery pulse sequences with and without fat saturation .     CONTRAST: Pre and postcontrast imaging with 20 mL of ProHance     FINDINGS: Images are partially degraded secondary to patient motion artifact.     There is a 2.2 x 2.7 cm soft tissue wound in the skin surface and subcutaneous  tissues of the plantar aspect of the midfoot. There is a fluid collection that  extends from near this into the subcutaneous tissues more medially. This  measures 3.7 x 1.4 x 4.5 cm and is best seen on series 7 image 25 and series 10  image 7. There is extensive subcutaneous edema in the foot which is nonspecific.     There is diffuse atrophy and mild edema throughout the musculature likely  related to diabetic neuropathy. There is a focus of metallic susceptibly  artifact in the subcutaneous tissues adjacent to the fifth metatarsal. This  corresponds to a thin metallic fragment on the prior radiographs. This is not  adjacent to the wound.     There is severe degenerative changes throughout the midfoot with associated  edema. No significant joint effusion. Patient's wound and fluid collection does  not extend to the osseous structures of the midfoot.     IMPRESSION  1. Soft tissue wound of the plantar aspect of the midfoot with an underlying  fluid collection extending medially in the subcutaneous tissues. 2. Nonspecific extensive diffuse subcutaneous edema. 3. Small metallic foreign body adjacent to the fifth metatarsal head and  subcutaneous tissues. This is not adjacent to the wound. 4. Severe degenerative changes in the midfoot with edema likely related to  Charcot arthropathy.  Septic arthritis can have a similar appearance, but is  unlikely given that the fluid collection does not extend to the osseous  structures of the midfoot and there is no significant joint effusion.              Impression:     1. Abscess and tracking infection from the plantar to medial mid foot  2. diabetes - neurological manifestation and Charcot foot  3. ulcer of heel and midfoot   4. Lymphedema and venous insufficiency          Recommendation:   Spoke to the patient and his wife and advised Incision and drainage they both agreed and consented for the same  Scheduled I&D today evening  Continue Iv abx   Patient education for diabetes - neurological manifestation and ulcer of heel and midfoot. I would like to thank Dr. Kaitlynn Doyle and nursing staff for assistance in the team approach and care for the patient.

## 2021-06-23 NOTE — BRIEF OP NOTE
Brief Postoperative Note    Patient: Jessy Mahoney  YOB: 1960  MRN: [id number]    Date of Procedure: 6/22/2021     Pre-Op Diagnosis: infected foot    Post-Op Diagnosis: Same as preoperative diagnosis.       Procedure(s):  INCISION AND DRAINAGE LEFT FOOT    Surgeon(s):  Vandana Ruggiero DPM    Surgical Assistant: None    Anesthesia: General     Estimated Blood Loss (mL): less than 50     Complications: None    Specimens:   ID Type Source Tests Collected by Time Destination   1 : Left Foot Wound Wound Foot, left ANAEROBIC/AEROBIC/GRAM STAIN Vandana Ruggiero DPM 6/22/2021 1938 Microbiology        Implants: * No implants in log *    Drains: * No LDAs found * Iodoform packing     Findings: medial heel and mid foot abscess released purulence approximately 10cc's abscess to the level of plantar fascia and medial Abductor muscle     Electronically Signed by Rachel Anthony DPM on 6/22/2021 at 8:18 PM

## 2021-06-23 NOTE — PROGRESS NOTES
Reason for Admission:  Chronic ulcer of left foot                   RUR Score:   10%                  Plan for utilizing home health:   Not anticipated       PCP: First and Last name:  Tin Emmanuel MD     Name of Practice:    Are you a current patient: Yes/No: yes   Approximate date of last visit: 6/16/21   Can you participate in a virtual visit with your PCP: no                    Current Advanced Directive/Advance Care Plan: Full Code [address] (ACP) Conversation      Date of Conversation: 6/23/21  Conducted with: Patient with Decision Making Capacity    Healthcare Decision Maker:     Content/Action Overview:   Has NO ACP documents/care preferences - information provided, considering goals and options    Length of Voluntary ACP Conversation in minutes:  <16 minutes (Non-Billable)    [facility]                        Transition of Care Plan:   CM met with pt at bedside. Prior to admission, pt was independent with ADL/IADL to include driving. Patient has used [facility] in the past and owns crutches. No history of Rehab. Patients support system includes, girlfriend, son, daughter in-law, friends & neighbor. No needs or concerns identified at this time. CM will continue to follow. PCP-    Tin Emmanuel MD  Pharmacy-[facility]    Care Management Interventions  PCP Verified by CM: Yes (Tin Emmanuel MD)  Mode of Transport at Discharge: Other (see comment) (girlfriend, Christopher Rodriges)  Transition of Care Consult (CM Consult): Discharge Planning  Discharge Durable Medical Equipment: No (crutches)  Physical Therapy Consult: No  Occupational Therapy Consult: No  Speech Therapy Consult: No  Current Support Network:  Other, Family Lives Nearby (Lives w/girlfriend in a one story home w/3 STE)  Confirm Follow Up Transport: Self  Discharge Location  Discharge Placement:  (Anticipate Home w/family)      [facility]  Ext 1423    Transition of Care Plan:    RUR:10%  Disposition:Home w/girlfriend  Follow up appointments:  DME needed:TBD  Transportation at [address] or means to access home:  yes      IM Medicare letter:n/a  Caregiver Contact:Yudi canchola [phone]  Discharge Caregiver contacted prior to discharge?

## 2021-06-23 NOTE — PROGRESS NOTES
Hospitalist Progress Note    NAME: Gaudencio Dave   :  1960   MRN:  [id number]       Assessment / Plan:  Left foot cellulitis POA  With chronic left foot diabetic ulcer-nonhealing POA  -appreciate podiatry and vascular surgery consults  -s/p I&D of Lt foot wound last night ()  -medial heel/mid foot abscess explored with release of purulent fluid per DPM  -follow surgical cultures  -cont IV abx with cefepime, vanc, flagyl for now  -await surgical cultures, adjust abx based on cultures  -wound care  -pain currently adequately controlled  -no role for vascular surg intervention now, high risk for amputation  -CRP 9.5  -WBC 14.1 POA--> 11.6  -Afebrile    MRI Lt fot :  1. Soft tissue wound of the plantar aspect of the midfoot with an underlying  fluid collection extending medially in the subcutaneous tissues. 2. Nonspecific extensive diffuse subcutaneous edema. 3. Small metallic foreign body adjacent to the fifth metatarsal head and  subcutaneous tissues. This is not adjacent to the wound. 4. Severe degenerative changes in the midfoot with edema likely related to  Charcot arthropathy. Septic arthritis can have a similar appearance, but is  unlikely given that the fluid collection does not extend to the osseous  structures of the midfoot and there is no significant joint effusion. DM2 with hyperglycemia  -cont glipizide at reduced dose 5mg bid, hold metformin  -cont lantus 74u qhs and SSI with POCs  -last A1C 5.9 21  -holding home Ozempic q.  Weekly    SHELBY vs CKD POA  -Cr 1.8 on admission, no prior Cr on file  -diabetic nephropathy?  -repeat labs with Cr 1.63, cont to monitor, renally dose meds  -OP follow up with nephrology, consider inpatient consult if renal function worsens    Chronic Lymphedema of both lower extremities POA   -appreciate wound care team following  -follows OP in lymphedema clinic    Left ilac artery stenosis  -s/p stent 2021 per patient    Hypertension  -cont BB, CCB  -hold vasotec, HCTZ , monitor BP    Tobacco abuse disorder POA    -X-ray left foot= chronic plantar wound/midfoot with diffuse soft tissue swelling  -CRP >9.5        Morbid Obesity POA  Weight loss recommended        Code Status: Full code as per patient's wishes  Surrogate Decision Maker: Ashley Medrano (significant other) as per patient     DVT Prophylaxis: Subcu heparin  GI Prophylaxis: not indicated     Baseline: Patient lives with significant other at home, independent with ADLs, has been dealing with chronic lymphedema of both legs     Subjective:     Chief Complaint / Reason for Physician Visit  Patient sitting in bed, foot elevated    Discussed with RN events overnight. Review of Systems:  Symptom Y/N Comments  Symptom Y/N Comments   Fever/Chills n   Chest Pain n    Poor Appetite n   Edema y    Cough n   Abdominal Pain n    Sputum n   Joint Pain     SOB/FELIX n   Pruritis/Rash     Nausea/vomit n   Tolerating PT/OT     Diarrhea    Tolerating Diet     Constipation    Other       Could NOT obtain due to:      Objective:     VITALS:   Last 24hrs VS reviewed since prior progress note.  Most recent are:  Patient Vitals for the past 24 hrs:   Temp Pulse Resp BP SpO2   06/23/21 1533 98 °F (36.7 °C) 80 17 (!) 155/63 96 %   06/23/21 1059 98.6 °F (37 °C) 87 18 (!) 142/77 91 %   06/23/21 0906 97.8 °F (36.6 °C) 92 16 (!) 160/90 94 %   06/23/21 0444 98 °F (36.7 °C) 81 16 119/67 93 %   06/23/21 0006 98.3 °F (36.8 °C)  20     06/22/21 2100  72 19 (!) 142/68 96 %   06/22/21 2050 98.5 °F (36.9 °C) 72 21 135/68 97 %   06/22/21 2045  74 19 133/66 96 %   06/22/21 2040  74 21 138/65 96 %   06/22/21 2035  75 22 137/64 95 %   06/22/21 2030  76 22 132/70 96 %   06/22/21 2025  80 12 (!) 114/44 95 %   06/22/21 2023 98.5 °F (36.9 °C) 79 24 (!) 118/44 93 %       Intake/Output Summary (Last 24 hours) at 6/23/2021 1634  Last data filed at 6/22/2021 2220  Gross per 24 hour   Intake 1500 ml   Output 1010 ml   Net 490 ml        I had a face to face encounter and independently examined this patient on 6/23/2021, as outlined below:  PHYSICAL EXAM:  General: WD, WN. Alert, cooperative, no acute distress    EENT:  EOMI. Anicteric sclerae. MMM  Resp:  CTA bilaterally, no wheezing or rales. No accessory muscle use  CV:  Regular  rhythm,  No edema  GI:  Soft, Non distended, Non tender. +Bowel sounds  Neurologic:  Alert and oriented X 3, normal speech,   Psych:   Good insight. Not anxious nor agitated  Skin:  No rashes. No jaundice. MSK:  Lt foot + surgical dressings in place, ACE bandage covering    Reviewed most current lab test results and cultures  YES  Reviewed most current radiology test results   YES  Review and summation of old records today    NO  Reviewed patient's current orders and MAR    YES  PMH/ reviewed - no change compared to H&P  ________________________________________________________________________  Care Plan discussed with:    Comments   Patient x    Family      RN x    Care Manager     Consultant                       x Multidiciplinary team rounds were held today with , nursing, pharmacist and clinical coordinator. Patient's plan of care was discussed; medications were reviewed and discharge planning was addressed. ________________________________________________________________________  Total NON critical care TIME:  35  Minutes    Total CRITICAL CARE TIME Spent:   Minutes non procedure based      Comments   >50% of visit spent in counseling and coordination of care x    ________________________________________________________________________  Fiona Tripathisin, DO     Procedures: see electronic medical records for all procedures/Xrays and details which were not copied into this note but were reviewed prior to creation of Plan. LABS:  I reviewed today's most current labs and imaging studies.   Pertinent labs include:  Recent Labs     06/23/21  0320 06/22/21  0038 06/21/21  1841   WBC 14.6* 11.6* 14.1*   HGB 12.2 11.0* 12.7   HCT 37.6 34.5* 38.8    220 281     Recent Labs     06/23/21  0320 06/22/21  0038 06/21/21  1730   * 136 138   K 5.1 3.9 4.5    105 104   CO2 25 28 31   * 128* 154*   BUN 34* 32* 33*   CREA 1.74* 1.63* 1.86*   CA 8.7 8.7 9.1   ALB  --   --  2.2*   TBILI  --   --  0.2   ALT  --   --  12       Signed: Rubin Parry, DO

## 2021-06-24 LAB
ANION GAP SERPL CALC-SCNC: 5 MMOL/L (ref 5–15)
BUN SERPL-MCNC: 44 MG/DL (ref 6–20)
BUN/CREAT SERPL: 24 (ref 12–20)
CALCIUM SERPL-MCNC: 8 MG/DL (ref 8.5–10.1)
CHLORIDE SERPL-SCNC: 104 MMOL/L (ref 97–108)
CO2 SERPL-SCNC: 26 MMOL/L (ref 21–32)
CREAT SERPL-MCNC: 1.8 MG/DL (ref 0.7–1.3)
DATE LAST DOSE: ABNORMAL
GLUCOSE BLD STRIP.AUTO-MCNC: 202 MG/DL (ref 65–117)
GLUCOSE BLD STRIP.AUTO-MCNC: 265 MG/DL (ref 65–117)
GLUCOSE BLD STRIP.AUTO-MCNC: 312 MG/DL (ref 65–117)
GLUCOSE BLD STRIP.AUTO-MCNC: 351 MG/DL (ref 65–117)
GLUCOSE SERPL-MCNC: 346 MG/DL (ref 65–100)
POTASSIUM SERPL-SCNC: 4.1 MMOL/L (ref 3.5–5.1)
REPORTED DOSE,DOSE: ABNORMAL UNITS
REPORTED DOSE/TIME,TMG: ABNORMAL
SERVICE CMNT-IMP: ABNORMAL
SODIUM SERPL-SCNC: 135 MMOL/L (ref 136–145)
VANCOMYCIN TROUGH SERPL-MCNC: 3.8 UG/ML (ref 5–10)

## 2021-06-24 PROCEDURE — 74011250637 HC RX REV CODE- 250/637: Performed by: PODIATRIST

## 2021-06-24 PROCEDURE — 74011000258 HC RX REV CODE- 258: Performed by: PODIATRIST

## 2021-06-24 PROCEDURE — 74011250636 HC RX REV CODE- 250/636: Performed by: PODIATRIST

## 2021-06-24 PROCEDURE — 65270000029 HC RM PRIVATE

## 2021-06-24 PROCEDURE — 94760 N-INVAS EAR/PLS OXIMETRY 1: CPT

## 2021-06-24 PROCEDURE — 74011636637 HC RX REV CODE- 636/637: Performed by: PODIATRIST

## 2021-06-24 PROCEDURE — 74011636637 HC RX REV CODE- 636/637: Performed by: INTERNAL MEDICINE

## 2021-06-24 PROCEDURE — 77010033678 HC OXYGEN DAILY

## 2021-06-24 PROCEDURE — 74011000250 HC RX REV CODE- 250: Performed by: PODIATRIST

## 2021-06-24 PROCEDURE — 82962 GLUCOSE BLOOD TEST: CPT

## 2021-06-24 PROCEDURE — 74011250637 HC RX REV CODE- 250/637: Performed by: INTERNAL MEDICINE

## 2021-06-24 RX ORDER — INSULIN LISPRO 100 [IU]/ML
15 INJECTION, SOLUTION INTRAVENOUS; SUBCUTANEOUS
Status: DISCONTINUED | OUTPATIENT
Start: 2021-06-24 | End: 2021-06-25

## 2021-06-24 RX ORDER — VANCOMYCIN 1.75 GRAM/500 ML IN 0.9 % SODIUM CHLORIDE INTRAVENOUS
1750 EVERY 12 HOURS
Status: DISCONTINUED | OUTPATIENT
Start: 2021-06-24 | End: 2021-06-25

## 2021-06-24 RX ORDER — INSULIN LISPRO 100 [IU]/ML
20 INJECTION, SOLUTION INTRAVENOUS; SUBCUTANEOUS
Status: DISCONTINUED | OUTPATIENT
Start: 2021-06-24 | End: 2021-06-24

## 2021-06-24 RX ORDER — GLIPIZIDE 5 MG/1
10 TABLET ORAL
Status: DISCONTINUED | OUTPATIENT
Start: 2021-06-24 | End: 2021-06-26 | Stop reason: HOSPADM

## 2021-06-24 RX ADMIN — SODIUM CHLORIDE 10 ML: 9 INJECTION, SOLUTION INTRAMUSCULAR; INTRAVENOUS; SUBCUTANEOUS at 14:19

## 2021-06-24 RX ADMIN — INSULIN LISPRO 4 UNITS: 100 INJECTION, SOLUTION INTRAVENOUS; SUBCUTANEOUS at 09:44

## 2021-06-24 RX ADMIN — VANCOMYCIN HYDROCHLORIDE 1750 MG: 10 INJECTION, POWDER, LYOPHILIZED, FOR SOLUTION INTRAVENOUS at 22:42

## 2021-06-24 RX ADMIN — INSULIN LISPRO 3 UNITS: 100 INJECTION, SOLUTION INTRAVENOUS; SUBCUTANEOUS at 17:49

## 2021-06-24 RX ADMIN — CEFEPIME HYDROCHLORIDE 2 G: 2 INJECTION, POWDER, FOR SOLUTION INTRAVENOUS at 14:12

## 2021-06-24 RX ADMIN — ATENOLOL 100 MG: 50 TABLET ORAL at 09:44

## 2021-06-24 RX ADMIN — INSULIN GLARGINE 74 UNITS: 100 INJECTION, SOLUTION SUBCUTANEOUS at 23:14

## 2021-06-24 RX ADMIN — INSULIN LISPRO 15 UNITS: 100 INJECTION, SOLUTION INTRAVENOUS; SUBCUTANEOUS at 17:48

## 2021-06-24 RX ADMIN — CEFEPIME HYDROCHLORIDE 2 G: 2 INJECTION, POWDER, FOR SOLUTION INTRAVENOUS at 20:55

## 2021-06-24 RX ADMIN — METRONIDAZOLE 500 MG: 500 INJECTION, SOLUTION INTRAVENOUS at 21:15

## 2021-06-24 RX ADMIN — CLOPIDOGREL BISULFATE 75 MG: 75 TABLET ORAL at 09:43

## 2021-06-24 RX ADMIN — ASPIRIN 81 MG: 81 TABLET, CHEWABLE ORAL at 09:43

## 2021-06-24 RX ADMIN — METRONIDAZOLE 500 MG: 500 INJECTION, SOLUTION INTRAVENOUS at 09:49

## 2021-06-24 RX ADMIN — PRAVASTATIN SODIUM 80 MG: 40 TABLET ORAL at 20:56

## 2021-06-24 RX ADMIN — SODIUM CHLORIDE 10 ML: 9 INJECTION, SOLUTION INTRAMUSCULAR; INTRAVENOUS; SUBCUTANEOUS at 20:57

## 2021-06-24 RX ADMIN — INSULIN LISPRO 2 UNITS: 100 INJECTION, SOLUTION INTRAVENOUS; SUBCUTANEOUS at 23:14

## 2021-06-24 RX ADMIN — INSULIN LISPRO 15 UNITS: 100 INJECTION, SOLUTION INTRAVENOUS; SUBCUTANEOUS at 14:11

## 2021-06-24 RX ADMIN — GLIPIZIDE 5 MG: 5 TABLET ORAL at 09:43

## 2021-06-24 RX ADMIN — INSULIN LISPRO 4 UNITS: 100 INJECTION, SOLUTION INTRAVENOUS; SUBCUTANEOUS at 14:12

## 2021-06-24 RX ADMIN — GLIPIZIDE 10 MG: 5 TABLET ORAL at 17:49

## 2021-06-24 RX ADMIN — AMLODIPINE BESYLATE 10 MG: 5 TABLET ORAL at 09:43

## 2021-06-24 RX ADMIN — CEFEPIME HYDROCHLORIDE 2 G: 2 INJECTION, POWDER, FOR SOLUTION INTRAVENOUS at 05:13

## 2021-06-24 RX ADMIN — SODIUM CHLORIDE 10 ML: 9 INJECTION, SOLUTION INTRAMUSCULAR; INTRAVENOUS; SUBCUTANEOUS at 05:13

## 2021-06-24 RX ADMIN — VANCOMYCIN HYDROCHLORIDE 1750 MG: 10 INJECTION, POWDER, LYOPHILIZED, FOR SOLUTION INTRAVENOUS at 14:58

## 2021-06-24 RX ADMIN — INSULIN LISPRO 15 UNITS: 100 INJECTION, SOLUTION INTRAVENOUS; SUBCUTANEOUS at 09:44

## 2021-06-24 NOTE — PROGRESS NOTES
Received notification from bedside RN about patient with regards to: , needs Lispro coverage order    Intervention given: Lispro 3 units SQ x 1 dose ordered

## 2021-06-24 NOTE — PROGRESS NOTES
Pharmacy Antimicrobial Kinetic Dosing    Indication for Antimicrobials: SSTI     Current Regimen of Each Antimicrobial:  Cefepime 2GM IV q 8hr    (Start Date   PM; Day # 3 of 5 )  Metronidazole 500 mg IV q 12HR  (Start Date   PM; Day # 3 of 5 )  Vancomycin = 2000mg IV q24h    (Start Date  ; Day # 3 of 5 )    Previous Antimicrobial Therapy:      Goal Level: VANCOMYCIN TROUGH GOAL RANGE    Vancomycin Trough: 10 - 15 mcg/mL    Date Dose & Interval Measured (mcg/mL) Extrapolated (mcg/mL)    @ 23:29 2000mg IV q24h 3.8 4.08    @ 0000              Date & time of next level:  at 00:00    Significant Positive Cultures:    Wound- NG so far, pending    Conditions for Dosing Consideration: None    Labs:  Recent Labs     219 21  0320 21  0038   CREA 1.80* 1.74* 1.63*   BUN 44* 34* 32*     Recent Labs     21  0320 21  0038 21  1841   WBC 14.6* 11.6* 14.1*     Temp (24hrs), Av.2 °F (36.8 °C), Min:97.8 °F (36.6 °C), Max:98.8 °F (37.1 °C)        Creatinine Clearance (mL/min):   CrCl (Ideal Body Weight): 47.3   If actual weight < IBW: CrCl (Actual Body Weight) 91.6    Impression/Plan:   Extrapolated trough subtherapeutic at 4 mcg/mL, although this was drawn after just one dose of Vancomycin as the prior dose had not been administered (and patient was not given a loading dose). Nevertheless, due to extremely low level, will increase Vancomycin dose to 1750mg IV q12h for a calculated trough of 13.6 mcg/mL and AUC of 475  Continue Cefepime and Metronidazole at current doses  BMP daily  Antimicrobial stop date 5 days     Pharmacy will follow daily and adjust medications as appropriate for renal function and/or serum levels.     Thank you,  Amaris Mantilla    Vancomycin Dosing Document    Documents located on pharmacy Teams site: Clinical Practice -> Antimicrobial Stewardship -> Antibiotics_Vancomycin     Aminoglycoside Dosing Document    Documents located on pharmacy Teams site: Clinical Practice -> Antimicrobial Stewardship -> Antibiotics_Aminoglycosides

## 2021-06-24 NOTE — OP NOTES
[address]  OPERATIVE REPORT    Name:  Steve Lomax  MR#:  [id number]  :  1960  ACCOUNT #:  [de-identified]  DATE OF SERVICE:  2021    PREOPERATIVE DIAGNOSIS:  Diabetic foot infection, left. POSTOPERATIVE DIAGNOSIS:  Diabetic foot infection, left with a large abscess on the medial heel. PROCEDURE PERFORMED:  Incision and drainage, left foot. SURGEON:  Marce Harada, DPM    ASSISTANT:  None. ANESTHESIA:  General.    COMPLICATIONS:  None. SPECIMENS REMOVED:  Deep cultures were taken after draining abscess. IMPLANTS:  None. ESTIMATED BLOOD LOSS:  Less than 50 mL. DRAINS:  Iodoform packing. PROCEDURE:  The patient was brought into OR and stayed in bed for anesthesia and procedure. Due to the large size of the patient, the OR table was not used. General anesthesia was performed as per Dr. Lei Huber and Venancio KAMARA. The local infiltration of 0.5% Marcaine plain was injected and tourniquet was placed with Webril padding at the ankle tourniquet and set at 250 mmHg, and the left foot was prepped and draped in the usual sterile manner. After Anesthesia check, an incision was made on the plantar medial aspect of the ulceration, which was mostly on the plantar aspect of the midfoot. The incision was made over the area where the erythema was noted. Incision was deepened into the subcutaneous tissue to the level of a plantar fascia and extended along the plantar medial proximal area into the edvin pedis area along the medial heel where a large abscess was noted. The sharp and blunt dissection was carried down removing any devitalized necrotic tissues. The debridement of the ulcer was also performed using a sharp and blunt dissection, excising the necrotic fascial element and plantar fat pad as well as medial abductor muscle area. The excision was performed into the muscular level. The entire contents of the large abscess, which was found on the medial heel. It was opened and removed of the contents. Thorough care with the thyroid investigation was done to remove any further abscesses, which were not extended beyond the medial abscess. This was completely excised and opened with deep into the muscular layer removing all the devitalized tissue and emptying the contents. A copious irrigation of Irrisept was performed. The debridement was done using a #10 sterile blade, rongeur as well as the Surf Canyononix ultrasonic debrider. This achieved excellent debridement removing all the devitalized tissue to a good vascular hemorrhagic bed with viable tissue base. The area was then thoroughly and copiously irrigated with Irrisept followed by normal saline and once again, the toe inspection was done to eliminate any devitalized tissue or tracts or abscesses. Cultures were taken before the irrigation from the purulence, which was found on the medial abscess. After irrigation, the 1-inch iodoform packing was placed in the entire abscess area and dissected area traveling and exiting at the plantar wound site. The incision was then reapproximated using a 2-0 nylon mattress sutures and simple sutures. The small vessels were cauterized before the closure and a mild compressive bandage was applied. Tourniquet was deflated noting hyperemia and mild hemorrhage was noted through the incision site. The hemorrhage was then controlled with compressive bandage. The patient tolerated the procedure and sterilely without any complications and sent back to medical floor for medical management and continuing IV antibiotics. We will follow up with the patient when the cultures come back for continued care, may need second debridement if the infection signs are not subsided.         PAIGE [id number]  D:  06/24/2021 13:58  T:  06/24/2021 19:12  JOB #:  [id number]

## 2021-06-24 NOTE — PROGRESS NOTES
Hospitalist Progress Note    NAME: Christine Reed   :  1960   MRN:  [id number]       Assessment / Plan:  Left foot cellulitis POA  With chronic left foot diabetic ulcer-nonhealing POA  -appreciate podiatry and vascular surgery consults  -s/p I&D of Lt foot wound   -medial heel/mid foot abscess explored with release of purulent fluid per DPM  -follow surgical cultures, no growth so far  -cont IV abx with cefepime, vanc, flagyl for now  -wound care and pain control as per podiatry  -repeat CBC in am, leukocytosis noted  -no role for vascular surg intervention now, high risk for amputation  -CRP 9.5    MRI Lt fot :  1. Soft tissue wound of the plantar aspect of the midfoot with an underlying  fluid collection extending medially in the subcutaneous tissues. 2. Nonspecific extensive diffuse subcutaneous edema. 3. Small metallic foreign body adjacent to the fifth metatarsal head and  subcutaneous tissues. This is not adjacent to the wound. 4. Severe degenerative changes in the midfoot with edema likely related to  Charcot arthropathy. Septic arthritis can have a similar appearance, but is  unlikely given that the fluid collection does not extend to the osseous  structures of the midfoot and there is no significant joint effusion. DM2 with hyperglycemia  -cont glipizide at reduced dose 5mg bid, hold metformin  -cont lantus 74u qhs. Increase mealtime insulin to 20u tid w meals as per home regimen  -SSI with POCs  -last A1C 5.9 21  -holding home Ozempic q.  Weekly    SHELBY vs CKD POA  -Cr 1.8 on admission, no prior Cr on file  -diabetic nephropathy?  -repeat labs with Cr 1.63, cont to monitor, renally dose meds  -OP follow up with nephrology, consider inpatient consult if renal function worsens    Chronic Lymphedema of both lower extremities POA   -appreciate wound care team following  -follows OP in lymphedema clinic    Left ilac artery stenosis  -s/p stent 2021 per patient    Hypertension  -cont BB, CCB  -hold vasotec, HCTZ , monitor BP    Tobacco abuse disorder POA    -X-ray left foot= chronic plantar wound/midfoot with diffuse soft tissue swelling  -CRP >9.5        Morbid Obesity POA  Weight loss recommended        Code Status: Full code as per patient's wishes  Surrogate Decision Maker: Ryan Gennydestiney (significant other) as per patient     DVT Prophylaxis: Subcu heparin  GI Prophylaxis: not indicated     Baseline: Patient lives with significant other at home, independent with ADLs, has been dealing with chronic lymphedema of both legs     Subjective:     Chief Complaint / Reason for Physician Visit  No new complaints. Denies foot pain, denies fevers/chills    Discussed with RN events overnight. Review of Systems:  Symptom Y/N Comments  Symptom Y/N Comments   Fever/Chills n   Chest Pain n    Poor Appetite n   Edema y    Cough n   Abdominal Pain n    Sputum n   Joint Pain     SOB/FELIX n   Pruritis/Rash     Nausea/vomit n   Tolerating PT/OT     Diarrhea    Tolerating Diet     Constipation    Other       Could NOT obtain due to:      Objective:     VITALS:   Last 24hrs VS reviewed since prior progress note. Most recent are:  Patient Vitals for the past 24 hrs:   Temp Pulse Resp BP SpO2   06/24/21 1029 98.2 °F (36.8 °C) 97 18 (!) 148/68 98 %   06/24/21 0724 98 °F (36.7 °C) 77 18 (!) 164/75 97 %   06/24/21 0232 98 °F (36.7 °C) 88 18 (!) 165/74 94 %   06/23/21 2130 98.8 °F (37.1 °C) 84 18 (!) 153/60 97 %   06/23/21 1533 98 °F (36.7 °C) 80 17 (!) 155/63 96 %       Intake/Output Summary (Last 24 hours) at 6/24/2021 1415  Last data filed at 6/23/2021 2340  Gross per 24 hour   Intake 700 ml   Output    Net 700 ml        I had a face to face encounter and independently examined this patient on 6/24/2021, as outlined below:  PHYSICAL EXAM:  General: WD, WN. Alert, cooperative, no acute distress    EENT:  EOMI. Anicteric sclerae. MMM  Resp:  CTA bilaterally, no wheezing or rales.   No accessory muscle use  CV:  Regular  rhythm,  No edema  GI:  Soft, Non distended, Non tender. +Bowel sounds  Neurologic:  Alert and oriented X 3, normal speech,   Psych:   Good insight. Not anxious nor agitated  Skin:  No rashes. No jaundice. MSK:  Lt foot + surgical dressings in place, ACE bandage covering    Reviewed most current lab test results and cultures  YES  Reviewed most current radiology test results   YES  Review and summation of old records today    NO  Reviewed patient's current orders and MAR    YES  PMH/SH reviewed - no change compared to H&P  ________________________________________________________________________  Care Plan discussed with:    Comments   Patient x    Family      RN x    Care Manager     Consultant                       x Multidiciplinary team rounds were held today with , nursing, pharmacist and clinical coordinator. Patient's plan of care was discussed; medications were reviewed and discharge planning was addressed. ________________________________________________________________________  Total NON critical care TIME:  35  Minutes    Total CRITICAL CARE TIME Spent:   Minutes non procedure based      Comments   >50% of visit spent in counseling and coordination of care x    ________________________________________________________________________  Arline Chapman DO     Procedures: see electronic medical records for all procedures/Xrays and details which were not copied into this note but were reviewed prior to creation of Plan. LABS:  I reviewed today's most current labs and imaging studies.   Pertinent labs include:  Recent Labs     06/23/21  0320 06/22/21  0038 06/21/21  1841   WBC 14.6* 11.6* 14.1*   HGB 12.2 11.0* 12.7   HCT 37.6 34.5* 38.8    220 281     Recent Labs     06/23/21  2329 06/23/21  0320 06/22/21  0038 06/21/21  1730 06/21/21  1730   * 135* 136   < > 138   K 4.1 5.1 3.9   < > 4.5    104 105   < > 104   CO2 26 25 28 < > 31   * 268* 128*   < > 154*   BUN 44* 34* 32*   < > 33*   CREA 1.80* 1.74* 1.63*   < > 1.86*   CA 8.0* 8.7 8.7   < > 9.1   ALB  --   --   --   --  2.2*   TBILI  --   --   --   --  0.2   ALT  --   --   --   --  12    < > = values in this interval not displayed.        Signed: Janet Valdes, DO

## 2021-06-24 NOTE — PROGRESS NOTES
Problem: Falls - Risk of  Goal: *Absence of Falls  Description: Document Shade Reveles Fall Risk and appropriate interventions in the flowsheet. Outcome: Progressing Towards Goal  Note: Fall Risk Interventions:  Mobility Interventions: Patient to call before getting OOB         Medication Interventions: Assess postural VS orthostatic hypotension, Patient to call before getting OOB                   Problem: Patient Education: Go to Patient Education Activity  Goal: Patient/Family Education  Outcome: Progressing Towards Goal     Problem: Diabetes Self-Management  Goal: *Disease process and treatment process  Description: Define diabetes and identify own type of diabetes; list 3 options for treating diabetes. Outcome: Progressing Towards Goal  Goal: *Incorporating nutritional management into lifestyle  Description: Describe effect of type, amount and timing of food on blood glucose; list 3 methods for planning meals. Outcome: Progressing Towards Goal  Goal: *Incorporating physical activity into lifestyle  Description: State effect of exercise on blood glucose levels. Outcome: Progressing Towards Goal  Goal: *Developing strategies to promote health/change behavior  Description: Define the ABC's of diabetes; identify appropriate screenings, schedule and personal plan for screenings. Outcome: Progressing Towards Goal  Goal: *Using medications safely  Description: State effect of diabetes medications on diabetes; name diabetes medication taking, action and side effects. Outcome: Progressing Towards Goal  Goal: *Monitoring blood glucose, interpreting and using results  Description: Identify recommended blood glucose targets  and personal targets. Outcome: Progressing Towards Goal  Goal: *Prevention, detection, treatment of acute complications  Description: List symptoms of hyper- and hypoglycemia; describe how to treat low blood sugar and actions for lowering  high blood glucose level.   Outcome: Progressing Towards Goal  Goal: *Prevention, detection and treatment of chronic complications  Description: Define the natural course of diabetes and describe the relationship of blood glucose levels to long term complications of diabetes. Outcome: Progressing Towards Goal  Goal: *Developing strategies to address psychosocial issues  Description: Describe feelings about living with diabetes; identify support needed and support network  Outcome: Progressing Towards Goal     Problem: Patient Education: Go to Patient Education Activity  Goal: Patient/Family Education  Outcome: Progressing Towards Goal     Problem: Lower Extremity Wound Care  Goal: *Non-infected wound: Improvement of existing wound, absence of infection, and maintenance of skin integrity  Outcome: Progressing Towards Goal  Goal: *Infected Wound: Prevention of further infection and promotion of healing  Description: Infection control procedures (eg: clean dressings, clean gloves, hand washing, precautions to isolate wound from contamination, sterile instruments used for wound debridement) should be implemented.   Outcome: Progressing Towards Goal  Goal: Interventions  Outcome: Progressing Towards Goal     Problem: Patient Education: Go to Patient Education Activity  Goal: Patient/Family Education  Outcome: Progressing Towards Goal

## 2021-06-24 NOTE — PROGRESS NOTES
Physician Progress Note      PATIENT:               Jacqueline Moreno  CSN #:                  [id number]  :                       1960  ADMIT DATE:       2021 6:42 PM  DISCH DATE:  RESPONDING  PROVIDER #:        Caleb Rodrigues DO          QUERY TEXT:    Pt admitted with Left foot cellulitis with chronic left foot diabetic ulcer-nonhealing. Pt noted to have Type 2 diabetes with  hyperglycemia. If possible, please document in progress notes and discharge summary the relationship, if any, between cellulitis and DM. The medical record reflects the following:  Risk Factors: 62 yo M admitted with Left foot cellulitis with chronic left foot diabetic ulcer-nonhealing  Clinical Indicators: Pt has Type 2 diabetes with  hyperglycemia  Treatment: SSI, IV vancomycin, cefepime,  Flagyl  Options provided:  -- Left Foot  cellulitis associated with Diabetes  -- Left foot  cellulitis unrelated to Diabetes  -- Other - I will add my own diagnosis  -- Disagree - Not applicable / Not valid  -- Disagree - Clinically unable to determine / Unknown  -- Refer to Clinical Documentation Reviewer    PROVIDER RESPONSE TEXT:    Left foot cellulitis associated with Diabetes.     Query created by: Gracy Vale on 2021 11:50 AM      Electronically signed by:  Caleb Rodrigues DO 2021 11:07 PM

## 2021-06-25 ENCOUNTER — ANESTHESIA EVENT (OUTPATIENT)
Dept: SURGERY | Age: 61
DRG: 623 | End: 2021-06-25
Payer: COMMERCIAL

## 2021-06-25 LAB
ANION GAP SERPL CALC-SCNC: 4 MMOL/L (ref 5–15)
BASOPHILS # BLD: 0.1 K/UL (ref 0–0.1)
BASOPHILS NFR BLD: 1 % (ref 0–1)
BUN SERPL-MCNC: 27 MG/DL (ref 6–20)
BUN/CREAT SERPL: 22 (ref 12–20)
CALCIUM SERPL-MCNC: 8.7 MG/DL (ref 8.5–10.1)
CHLORIDE SERPL-SCNC: 104 MMOL/L (ref 97–108)
CO2 SERPL-SCNC: 28 MMOL/L (ref 21–32)
CREAT SERPL-MCNC: 1.24 MG/DL (ref 0.7–1.3)
DATE LAST DOSE: ABNORMAL
DIFFERENTIAL METHOD BLD: ABNORMAL
EOSINOPHIL # BLD: 0.4 K/UL (ref 0–0.4)
EOSINOPHIL NFR BLD: 4 % (ref 0–7)
ERYTHROCYTE [DISTWIDTH] IN BLOOD BY AUTOMATED COUNT: 13 % (ref 11.5–14.5)
GLUCOSE BLD STRIP.AUTO-MCNC: 143 MG/DL (ref 65–117)
GLUCOSE BLD STRIP.AUTO-MCNC: 175 MG/DL (ref 65–117)
GLUCOSE BLD STRIP.AUTO-MCNC: 210 MG/DL (ref 65–117)
GLUCOSE BLD STRIP.AUTO-MCNC: 91 MG/DL (ref 65–117)
GLUCOSE SERPL-MCNC: 185 MG/DL (ref 65–100)
HCT VFR BLD AUTO: 40.4 % (ref 36.6–50.3)
HGB BLD-MCNC: 13.2 G/DL (ref 12.1–17)
IMM GRANULOCYTES # BLD AUTO: 0.1 K/UL (ref 0–0.04)
IMM GRANULOCYTES NFR BLD AUTO: 1 % (ref 0–0.5)
LYMPHOCYTES # BLD: 1.7 K/UL (ref 0.8–3.5)
LYMPHOCYTES NFR BLD: 17 % (ref 12–49)
MCH RBC QN AUTO: 28.4 PG (ref 26–34)
MCHC RBC AUTO-ENTMCNC: 32.7 G/DL (ref 30–36.5)
MCV RBC AUTO: 86.9 FL (ref 80–99)
MONOCYTES # BLD: 0.8 K/UL (ref 0–1)
MONOCYTES NFR BLD: 8 % (ref 5–13)
NEUTS SEG # BLD: 7.2 K/UL (ref 1.8–8)
NEUTS SEG NFR BLD: 69 % (ref 32–75)
NRBC # BLD: 0 K/UL (ref 0–0.01)
NRBC BLD-RTO: 0 PER 100 WBC
PLATELET # BLD AUTO: 283 K/UL (ref 150–400)
PMV BLD AUTO: 9.5 FL (ref 8.9–12.9)
POTASSIUM SERPL-SCNC: 4.7 MMOL/L (ref 3.5–5.1)
RBC # BLD AUTO: 4.65 M/UL (ref 4.1–5.7)
REPORTED DOSE,DOSE: ABNORMAL UNITS
REPORTED DOSE/TIME,TMG: ABNORMAL
SERVICE CMNT-IMP: ABNORMAL
SERVICE CMNT-IMP: NORMAL
SODIUM SERPL-SCNC: 136 MMOL/L (ref 136–145)
VANCOMYCIN TROUGH SERPL-MCNC: 22.1 UG/ML (ref 5–10)
WBC # BLD AUTO: 10.3 K/UL (ref 4.1–11.1)

## 2021-06-25 PROCEDURE — 74011250636 HC RX REV CODE- 250/636: Performed by: PODIATRIST

## 2021-06-25 PROCEDURE — 74011250637 HC RX REV CODE- 250/637: Performed by: PODIATRIST

## 2021-06-25 PROCEDURE — 65270000029 HC RM PRIVATE

## 2021-06-25 PROCEDURE — 77010033678 HC OXYGEN DAILY

## 2021-06-25 PROCEDURE — 80202 ASSAY OF VANCOMYCIN: CPT

## 2021-06-25 PROCEDURE — 74011636637 HC RX REV CODE- 636/637: Performed by: PODIATRIST

## 2021-06-25 PROCEDURE — 82962 GLUCOSE BLOOD TEST: CPT

## 2021-06-25 PROCEDURE — 85025 COMPLETE CBC W/AUTO DIFF WBC: CPT

## 2021-06-25 PROCEDURE — 94760 N-INVAS EAR/PLS OXIMETRY 1: CPT

## 2021-06-25 PROCEDURE — 74011636637 HC RX REV CODE- 636/637: Performed by: INTERNAL MEDICINE

## 2021-06-25 PROCEDURE — 74011000258 HC RX REV CODE- 258: Performed by: PODIATRIST

## 2021-06-25 PROCEDURE — 36415 COLL VENOUS BLD VENIPUNCTURE: CPT

## 2021-06-25 PROCEDURE — 74011250637 HC RX REV CODE- 250/637: Performed by: INTERNAL MEDICINE

## 2021-06-25 PROCEDURE — 80048 BASIC METABOLIC PNL TOTAL CA: CPT

## 2021-06-25 RX ORDER — INSULIN LISPRO 100 [IU]/ML
20 INJECTION, SOLUTION INTRAVENOUS; SUBCUTANEOUS
Status: DISCONTINUED | OUTPATIENT
Start: 2021-06-25 | End: 2021-06-26 | Stop reason: HOSPADM

## 2021-06-25 RX ADMIN — METRONIDAZOLE 500 MG: 500 INJECTION, SOLUTION INTRAVENOUS at 21:33

## 2021-06-25 RX ADMIN — SODIUM CHLORIDE 10 ML: 9 INJECTION, SOLUTION INTRAMUSCULAR; INTRAVENOUS; SUBCUTANEOUS at 21:35

## 2021-06-25 RX ADMIN — ATENOLOL 100 MG: 50 TABLET ORAL at 08:25

## 2021-06-25 RX ADMIN — INSULIN GLARGINE 74 UNITS: 100 INJECTION, SOLUTION SUBCUTANEOUS at 22:01

## 2021-06-25 RX ADMIN — VANCOMYCIN HYDROCHLORIDE 1750 MG: 10 INJECTION, POWDER, LYOPHILIZED, FOR SOLUTION INTRAVENOUS at 12:36

## 2021-06-25 RX ADMIN — INSULIN LISPRO 20 UNITS: 100 INJECTION, SOLUTION INTRAVENOUS; SUBCUTANEOUS at 12:39

## 2021-06-25 RX ADMIN — GLIPIZIDE 10 MG: 5 TABLET ORAL at 08:25

## 2021-06-25 RX ADMIN — CEFEPIME HYDROCHLORIDE 2 G: 2 INJECTION, POWDER, FOR SOLUTION INTRAVENOUS at 02:52

## 2021-06-25 RX ADMIN — PRAVASTATIN SODIUM 80 MG: 40 TABLET ORAL at 21:34

## 2021-06-25 RX ADMIN — CLOPIDOGREL BISULFATE 75 MG: 75 TABLET ORAL at 08:26

## 2021-06-25 RX ADMIN — CEFEPIME HYDROCHLORIDE 2 G: 2 INJECTION, POWDER, FOR SOLUTION INTRAVENOUS at 21:34

## 2021-06-25 RX ADMIN — METRONIDAZOLE 500 MG: 500 INJECTION, SOLUTION INTRAVENOUS at 08:26

## 2021-06-25 RX ADMIN — Medication 10 ML: at 12:00

## 2021-06-25 RX ADMIN — AMLODIPINE BESYLATE 10 MG: 5 TABLET ORAL at 08:26

## 2021-06-25 RX ADMIN — INSULIN LISPRO 2 UNITS: 100 INJECTION, SOLUTION INTRAVENOUS; SUBCUTANEOUS at 12:38

## 2021-06-25 RX ADMIN — INSULIN LISPRO 20 UNITS: 100 INJECTION, SOLUTION INTRAVENOUS; SUBCUTANEOUS at 18:48

## 2021-06-25 RX ADMIN — SODIUM CHLORIDE 10 ML: 9 INJECTION, SOLUTION INTRAMUSCULAR; INTRAVENOUS; SUBCUTANEOUS at 12:38

## 2021-06-25 RX ADMIN — SODIUM CHLORIDE 10 ML: 9 INJECTION, SOLUTION INTRAMUSCULAR; INTRAVENOUS; SUBCUTANEOUS at 02:51

## 2021-06-25 RX ADMIN — GLIPIZIDE 10 MG: 5 TABLET ORAL at 21:34

## 2021-06-25 RX ADMIN — ASPIRIN 81 MG: 81 TABLET, CHEWABLE ORAL at 08:25

## 2021-06-25 RX ADMIN — CEFEPIME HYDROCHLORIDE 2 G: 2 INJECTION, POWDER, FOR SOLUTION INTRAVENOUS at 15:31

## 2021-06-25 RX ADMIN — INSULIN LISPRO 20 UNITS: 100 INJECTION, SOLUTION INTRAVENOUS; SUBCUTANEOUS at 08:28

## 2021-06-25 NOTE — PROGRESS NOTES
1900--bedside report received from hung davison pt siting up in bed, oriented x 4, has no complaints at this time call bell in reach assessment as noted    2300--pt denies pain, dressing to left foot dry intact    0300--am labs drawn and sen to lab, denies pain at this time    0445--received call from lab, chemistry clotted, pt informed rn may be having surgery Saturday, will ask PICC team to place new iv as pt is also receiving multiple ABx and current # 22 is weaning, and have chemistry drawn at iv placement, pt still denies pain at this time    0600--message left for PICC team for new iv and chemistry, lab bag and label on computer in room    0700--bedside report given to Destin Hill rn who is assuming care of pt

## 2021-06-25 NOTE — ANESTHESIA PREPROCEDURE EVALUATION
Relevant Problems   CARDIOVASCULAR   (+) Iliac artery stenosis, left (HCC)      ENDOCRINE   (+) Obesity, morbid (HCC)   (+) Type 2 diabetes mellitus with hyperglycemia, with long-term current use of insulin (HCC)       Anesthetic History   No history of anesthetic complications            Review of Systems / Medical History  Patient summary reviewed, nursing notes reviewed and pertinent labs reviewed    Pulmonary                Comments: Former smoker   Neuro/Psych   Within defined limits           Cardiovascular    Hypertension          PAD    Exercise tolerance: >4 METS  Comments: Chronic Lymphedema     No ECG on file   GI/Hepatic/Renal         Renal disease: CRI       Endo/Other    Diabetes: poorly controlled, type 2, using insulin    Morbid obesity     Other Findings   Comments: Infected left diabetic foot ulcer         Physical Exam    Airway  Mallampati: II  TM Distance: 4 - 6 cm  Neck ROM: normal range of motion   Mouth opening: Normal     Cardiovascular  Regular rate and rhythm,  S1 and S2 normal,  no murmur, click, rub, or gallop             Dental  No notable dental hx       Pulmonary  Breath sounds clear to auscultation               Abdominal  GI exam deferred       Other Findings            Anesthetic Plan    ASA: 3  Anesthesia type: MAC and total IV anesthesia    Monitoring Plan: BIS      Induction: Intravenous  Anesthetic plan and risks discussed with: Patient

## 2021-06-25 NOTE — PROGRESS NOTES
Reviewed the results and data and appreciated    scheduled for wound closure and application of the Stravix graft tomorrow AM    Can be discharged on Sunday if medically stable

## 2021-06-25 NOTE — PROGRESS NOTES
Transition of Care Plan:     RUR:10%  Disposition:Home w/girlfriend  Follow up appointments:  DME needed:TBD  Transportation at [address] or means to access home:  yes      IM Medicare letter:n/a  Caregiver Contact:Yudi canchola Warner [phone]  Discharge Caregiver contacted prior to discharge? Patient to have procedure tomorrow. CM will continue to follow.     Kvng Jain  Ext [phone]

## 2021-06-25 NOTE — PROGRESS NOTES
End of Shift Note    Bedside shift change report given to JACE Molina (oncoming nurse) by Tiim Warner RN (offgoing nurse). Report included the following information SBAR, Kardex, Intake/Output, MAR and Recent Results    Shift worked:  7a to 7p     Shift summary and any significant changes:     NPO after midnight for foot surgery to close wound, Vanc trough elevated, vanc dose adjusted. Probable D/C on Sunday. Concerns for physician to address:       Zone phone for oncoming shift:   [phone]       Activity:  Activity Level: Up ad andrew  Number times ambulated in hallways past shift: up a dlib  Number of times OOB to chair past shift: up ad andrew    Cardiac:   Cardiac Monitoring: No      Cardiac Rhythm: Sinus Rhythm    Access:   Current line(s): PIV     Genitourinary:   Urinary status: voiding    Respiratory:   O2 Device: None (Room air)  Chronic home O2 use?: NO  Incentive spirometer at bedside: YES     GI:  Last Bowel Movement Date: 06/24/21  Current diet:  ADULT DIET Regular; 4 carb choices (60 gm/meal)  DIET NPO  Passing flatus: YES  Tolerating current diet: YES       Pain Management:   Patient states pain is manageable on current regimen: YES    Skin:  Eliceo Score: 23  Interventions: increase time out of bed    Patient Safety:  Fall Score:  Total Score: 1  Interventions: gripper socks       Length of Stay:  Expected LOS: 5d 7h  Actual LOS: [address] RN resolved; d/t viral URI; ambulating w/ O2 sats > 92% on RA; no need for steroids; can have PFTs as outpatient if pulmonary sx persist or worsen

## 2021-06-25 NOTE — PROGRESS NOTES
Problem: Falls - Risk of  Goal: *Absence of Falls  Description: Document Kari Anderson Fall Risk and appropriate interventions in the flowsheet.   Outcome: Progressing Towards Goal  Note: Fall Risk Interventions:  Mobility Interventions: Patient to call before getting OOB         Medication Interventions: Assess postural VS orthostatic hypotension

## 2021-06-25 NOTE — PROGRESS NOTES
1312 Lab notified RN of critical lab value Vanc trough of 22.1,  Message sent to Dr. Verna Hudson re: above via perfect serve and Pharmacist,Bal Spencer notified as well. Told to finish dose that is hanging and next dose time will be adjusted.

## 2021-06-25 NOTE — PROGRESS NOTES
Pharmacy Antimicrobial Kinetic Dosing    Indication for Antimicrobials: SSTI     Current Regimen of Each Antimicrobial:  Cefepime 2GM IV q 8hr    (Start Date   PM; Day # 4 of 5 )  Metronidazole 500 mg IV q 12HR  (Start Date   PM; Day # 4 of 5 )  Vancomycin = now 1750mg IV q12h    (Start Date  ; Day # 4 of 5 )    Previous Antimicrobial Therapy:      Goal Level: VANCOMYCIN TROUGH GOAL RANGE    Vancomycin Trough: 10 - 15 mcg/mL    Date Dose & Interval Measured (mcg/mL) Extrapolated (mcg/mL)    @ 23:29 2000mg IV q24h 3.8 4.08    @ 1200   1750 mg Q12H 22.1 25.7           Date & time of next level: will do  @ 1200    Significant Positive Cultures:    Wound- NG so far, pending    Conditions for Dosing Consideration: Morbid Obesity    Labs:  Recent Labs     21  1201 21  2329 21  0320   CREA 1.24 1.80* 1.74*   BUN 27* 44* 34*     Recent Labs     21  0249 21  0320   WBC 10.3 14.6*     Temp (24hrs), Av.3 °F (36.8 °C), Min:98 °F (36.7 °C), Max:98.6 °F (37 °C)        Creatinine Clearance (mL/min):   CrCl (Ideal Body Weight): 68.7   If actual weight < IBW: CrCl (Actual Body Weight) 133.0    Impression/Plan:   Continue Cefepime and Metronidazole at current doses  Trough 22.1  Dose adjusted to 1000 mg Q12H with anticipated level of 14.7 mcg/ml   BMP daily  Antimicrobial stop date 5 days     Pharmacy will follow daily and adjust medications as appropriate for renal function and/or serum levels.     Thank you,  Americo Dhaliwal [facility]    Vancomycin Dosing Document    Documents located on pharmacy Teams site: Clinical Practice -> Antimicrobial Stewardship -> Antibiotics_Vancomycin     Aminoglycoside Dosing Document    Documents located on pharmacy Teams site: Clinical Practice -> Antimicrobial Stewardship -> Antibiotics_Aminoglycosides

## 2021-06-25 NOTE — PROGRESS NOTES
Hospitalist Progress Note    NAME: Allen Ahuja   :  1960   MRN:  [id number]       Assessment / Plan:  Left foot cellulitis POA  With chronic left foot diabetic ulcer-nonhealing POA  -appreciate podiatry and vascular surgery consults  -s/p I&D of Lt foot wound   -medial heel/mid foot abscess explored with release of purulent fluid per DPM  -follow surgical cultures, no growth so far  -cont IV abx with cefepime, vanc, flagyl for now  -wound care and pain control as per podiatry  -appreciate podiatry, plan for wound closure  noted  -wound cultures  Gneg rods on thio broth    MRI Lt fot :  1. Soft tissue wound of the plantar aspect of the midfoot with an underlying  fluid collection extending medially in the subcutaneous tissues. 2. Nonspecific extensive diffuse subcutaneous edema. 3. Small metallic foreign body adjacent to the fifth metatarsal head and  subcutaneous tissues. This is not adjacent to the wound. 4. Severe degenerative changes in the midfoot with edema likely related to  Charcot arthropathy. Septic arthritis can have a similar appearance, but is  unlikely given that the fluid collection does not extend to the osseous  structures of the midfoot and there is no significant joint effusion. DM2 with hyperglycemia  -cont glipizide at reduced dose 5mg bid, hold metformin  -cont lantus 74u qhs. Increase mealtime insulin to 20u tid w meals as per home regimen  -SSI with POCs  -last A1C 5.9 21  -holding home Ozempic q.  Weekly    SHELBY vs CKD POA  -Cr 1.8 on admission, no prior Cr on file  -diabetic nephropathy?  -repeat labs with Cr 1.63, cont to monitor, renally dose meds  -OP follow up with nephrology, consider inpatient consult if renal function worsens    Chronic Lymphedema of both lower extremities POA   -appreciate wound care team following  -follows OP in lymphedema clinic    Left ilac artery stenosis  -s/p stent 2021 per patient    Hypertension  -cont BB, CCB  -hold vasotec, HCTZ , monitor BP    Tobacco abuse disorder POA    -X-ray left foot= chronic plantar wound/midfoot with diffuse soft tissue swelling  -CRP >9.5        Morbid Obesity POA  Weight loss recommended        Code Status: Full code as per patient's wishes  Surrogate Decision Maker: Ramana Webb (significant other) as per patient    Anticipated DC 6/27  Barriers to DC: awaiting cultures     DVT Prophylaxis: Subcu heparin  GI Prophylaxis: not indicated     Baseline: Patient lives with significant other at home, independent with ADLs, has been dealing with chronic lymphedema of both legs     Subjective:     Chief Complaint / Reason for Physician Visit  Patient resting in bed. No acute distress    Discussed with RN events overnight. Review of Systems:  Symptom Y/N Comments  Symptom Y/N Comments   Fever/Chills n   Chest Pain n    Poor Appetite n   Edema y    Cough n   Abdominal Pain n    Sputum n   Joint Pain     SOB/FELIX n   Pruritis/Rash     Nausea/vomit n   Tolerating PT/OT     Diarrhea    Tolerating Diet     Constipation    Other       Could NOT obtain due to:      Objective:     VITALS:   Last 24hrs VS reviewed since prior progress note. Most recent are:  Patient Vitals for the past 24 hrs:   Temp Pulse Resp BP SpO2   06/25/21 1225 98.4 °F (36.9 °C) 70 17 (!) 170/73 97 %   06/25/21 0751 98 °F (36.7 °C) 75 18 (!) 183/77 97 %   06/25/21 0307 98.6 °F (37 °C) 74 18 (!) 159/88 98 %   06/25/21 0000 98.2 °F (36.8 °C) 75 18 (!) 147/70 98 %   06/24/21 2007 98.2 °F (36.8 °C) 74 18 (!) 152/66 98 %   06/24/21 1530 98.1 °F (36.7 °C) 73 18 (!) 158/70 98 %       Intake/Output Summary (Last 24 hours) at 6/25/2021 1231  Last data filed at 6/25/2021 1641  Gross per 24 hour   Intake 2100 ml   Output 500 ml   Net 1600 ml        I had a face to face encounter and independently examined this patient on 6/25/2021, as outlined below:  PHYSICAL EXAM:  General: WD, WN. Alert, cooperative, no acute distress    EENT:  EOMI.  Anicteric sclerae. MMM  Resp:  CTA bilaterally, no wheezing or rales. No accessory muscle use  CV:  Regular  rhythm,  No edema  GI:  Soft, Non distended, Non tender. +Bowel sounds  Neurologic:  Alert and oriented X 3, normal speech,   Psych:   Good insight. Not anxious nor agitated  Skin:  No rashes. No jaundice. MSK:  Lt foot + surgical dressings in place, ACE bandage covering    Reviewed most current lab test results and cultures  YES  Reviewed most current radiology test results   YES  Review and summation of old records today    NO  Reviewed patient's current orders and MAR    YES  PMH/SH reviewed - no change compared to H&P  ________________________________________________________________________  Care Plan discussed with:    Comments   Patient x    Family      RN x    Care Manager     Consultant                       x Multidiciplinary team rounds were held today with , nursing, pharmacist and clinical coordinator. Patient's plan of care was discussed; medications were reviewed and discharge planning was addressed. ________________________________________________________________________  Total NON critical care TIME:  35  Minutes    Total CRITICAL CARE TIME Spent:   Minutes non procedure based      Comments   >50% of visit spent in counseling and coordination of care x    ________________________________________________________________________  Yuliana Pompaing,      Procedures: see electronic medical records for all procedures/Xrays and details which were not copied into this note but were reviewed prior to creation of Plan. LABS:  I reviewed today's most current labs and imaging studies.   Pertinent labs include:  Recent Labs     06/25/21  0249 06/23/21  0320   WBC 10.3 14.6*   HGB 13.2 12.2   HCT 40.4 37.6    276     Recent Labs     06/23/21  2329 06/23/21 0320   * 135*   K 4.1 5.1    104   CO2 26 25   * 268*   BUN 44* 34*   CREA 1.80* 1.74*   CA 8.0* 8.7 Signed: Rubin Parry DO

## 2021-06-25 NOTE — PROGRESS NOTES
Problem: Falls - Risk of  Goal: *Absence of Falls  Description: Document Onalee Gum Fall Risk and appropriate interventions in the flowsheet.   Outcome: Progressing Towards Goal  Note: Fall Risk Interventions:  Mobility Interventions: Patient to call before getting OOB         Medication Interventions: Teach patient to arise slowly

## 2021-06-26 ENCOUNTER — ANESTHESIA (OUTPATIENT)
Dept: SURGERY | Age: 61
DRG: 623 | End: 2021-06-26
Payer: COMMERCIAL

## 2021-06-26 VITALS
OXYGEN SATURATION: 100 % | BODY MASS INDEX: 42.66 KG/M2 | WEIGHT: 315 LBS | DIASTOLIC BLOOD PRESSURE: 70 MMHG | HEART RATE: 70 BPM | RESPIRATION RATE: 18 BRPM | HEIGHT: 72 IN | TEMPERATURE: 98 F | SYSTOLIC BLOOD PRESSURE: 145 MMHG

## 2021-06-26 LAB
ANION GAP SERPL CALC-SCNC: 5 MMOL/L (ref 5–15)
BACTERIA SPEC CULT: ABNORMAL
BACTERIA SPEC CULT: ABNORMAL
BUN SERPL-MCNC: 22 MG/DL (ref 6–20)
BUN/CREAT SERPL: 19 (ref 12–20)
CALCIUM SERPL-MCNC: 8.7 MG/DL (ref 8.5–10.1)
CHLORIDE SERPL-SCNC: 106 MMOL/L (ref 97–108)
CO2 SERPL-SCNC: 27 MMOL/L (ref 21–32)
CREAT SERPL-MCNC: 1.18 MG/DL (ref 0.7–1.3)
GLUCOSE BLD STRIP.AUTO-MCNC: 120 MG/DL (ref 65–117)
GLUCOSE BLD STRIP.AUTO-MCNC: 126 MG/DL (ref 65–117)
GLUCOSE BLD STRIP.AUTO-MCNC: 136 MG/DL (ref 65–117)
GLUCOSE BLD STRIP.AUTO-MCNC: 138 MG/DL (ref 65–117)
GLUCOSE SERPL-MCNC: 111 MG/DL (ref 65–100)
GRAM STN SPEC: ABNORMAL
POTASSIUM SERPL-SCNC: 4.2 MMOL/L (ref 3.5–5.1)
SERVICE CMNT-IMP: ABNORMAL
SODIUM SERPL-SCNC: 138 MMOL/L (ref 136–145)

## 2021-06-26 PROCEDURE — 82962 GLUCOSE BLOOD TEST: CPT

## 2021-06-26 PROCEDURE — 74011000250 HC RX REV CODE- 250: Performed by: PODIATRIST

## 2021-06-26 PROCEDURE — 74011000250 HC RX REV CODE- 250: Performed by: NURSE ANESTHETIST, CERTIFIED REGISTERED

## 2021-06-26 PROCEDURE — 77030036687 HC SHOE PSTOP S2SG -A

## 2021-06-26 PROCEDURE — 74011250637 HC RX REV CODE- 250/637: Performed by: PODIATRIST

## 2021-06-26 PROCEDURE — 74011250636 HC RX REV CODE- 250/636: Performed by: NURSE ANESTHETIST, CERTIFIED REGISTERED

## 2021-06-26 PROCEDURE — 36415 COLL VENOUS BLD VENIPUNCTURE: CPT

## 2021-06-26 PROCEDURE — 74011250636 HC RX REV CODE- 250/636: Performed by: PODIATRIST

## 2021-06-26 PROCEDURE — 80048 BASIC METABOLIC PNL TOTAL CA: CPT

## 2021-06-26 PROCEDURE — 0HRNXK3 REPLACEMENT OF LEFT FOOT SKIN WITH NONAUTOLOGOUS TISSUE SUBSTITUTE, FULL THICKNESS, EXTERNAL APPROACH: ICD-10-PCS | Performed by: PODIATRIST

## 2021-06-26 PROCEDURE — 2709999900 HC NON-CHARGEABLE SUPPLY: Performed by: PODIATRIST

## 2021-06-26 PROCEDURE — C1758 CATHETER, URETERAL: HCPCS | Performed by: PODIATRIST

## 2021-06-26 PROCEDURE — 74011636637 HC RX REV CODE- 636/637: Performed by: PODIATRIST

## 2021-06-26 PROCEDURE — 76210000006 HC OR PH I REC 0.5 TO 1 HR: Performed by: PODIATRIST

## 2021-06-26 PROCEDURE — 94760 N-INVAS EAR/PLS OXIMETRY 1: CPT

## 2021-06-26 PROCEDURE — 87075 CULTR BACTERIA EXCEPT BLOOD: CPT

## 2021-06-26 PROCEDURE — 77030019927 HC TBNG IRR CYSTO BAXT -A: Performed by: PODIATRIST

## 2021-06-26 PROCEDURE — C1769 GUIDE WIRE: HCPCS | Performed by: PODIATRIST

## 2021-06-26 PROCEDURE — 76010000138 HC OR TIME 0.5 TO 1 HR: Performed by: PODIATRIST

## 2021-06-26 PROCEDURE — C1889 IMPLANT/INSERT DEVICE, NOC: HCPCS | Performed by: PODIATRIST

## 2021-06-26 PROCEDURE — 77030018832 HC SOL IRR H20 ICUM -A: Performed by: PODIATRIST

## 2021-06-26 PROCEDURE — 77030031139 HC SUT VCRL2 J&J -A: Performed by: PODIATRIST

## 2021-06-26 PROCEDURE — 74011000258 HC RX REV CODE- 258: Performed by: PODIATRIST

## 2021-06-26 PROCEDURE — 87205 SMEAR GRAM STAIN: CPT

## 2021-06-26 PROCEDURE — 76060000032 HC ANESTHESIA 0.5 TO 1 HR: Performed by: PODIATRIST

## 2021-06-26 PROCEDURE — 74011250636 HC RX REV CODE- 250/636: Performed by: INTERNAL MEDICINE

## 2021-06-26 DEVICE — GRAFT HUM TISS W3XL6CM CRYOPRESERVED PLCNTA TISS UMB AMNION: Type: IMPLANTABLE DEVICE | Site: FOOT | Status: FUNCTIONAL

## 2021-06-26 RX ORDER — GUAIFENESIN 100 MG/5ML
81 LIQUID (ML) ORAL DAILY
Qty: 30 TABLET | Refills: 2 | Status: SHIPPED | OUTPATIENT
Start: 2021-06-27

## 2021-06-26 RX ORDER — FENTANYL CITRATE 50 UG/ML
INJECTION, SOLUTION INTRAMUSCULAR; INTRAVENOUS AS NEEDED
Status: DISCONTINUED | OUTPATIENT
Start: 2021-06-26 | End: 2021-06-26 | Stop reason: HOSPADM

## 2021-06-26 RX ORDER — SODIUM CHLORIDE 0.9 % (FLUSH) 0.9 %
5-40 SYRINGE (ML) INJECTION EVERY 8 HOURS
Status: DISCONTINUED | OUTPATIENT
Start: 2021-06-26 | End: 2021-06-26 | Stop reason: HOSPADM

## 2021-06-26 RX ORDER — ONDANSETRON 2 MG/ML
INJECTION INTRAMUSCULAR; INTRAVENOUS AS NEEDED
Status: DISCONTINUED | OUTPATIENT
Start: 2021-06-26 | End: 2021-06-26 | Stop reason: HOSPADM

## 2021-06-26 RX ORDER — CLOPIDOGREL BISULFATE 75 MG/1
75 TABLET ORAL DAILY
Qty: 30 TABLET | Refills: 2 | Status: SHIPPED | OUTPATIENT
Start: 2021-06-27

## 2021-06-26 RX ORDER — MIDAZOLAM HYDROCHLORIDE 1 MG/ML
INJECTION, SOLUTION INTRAMUSCULAR; INTRAVENOUS AS NEEDED
Status: DISCONTINUED | OUTPATIENT
Start: 2021-06-26 | End: 2021-06-26 | Stop reason: HOSPADM

## 2021-06-26 RX ORDER — BUPIVACAINE HYDROCHLORIDE 5 MG/ML
INJECTION, SOLUTION EPIDURAL; INTRACAUDAL AS NEEDED
Status: DISCONTINUED | OUTPATIENT
Start: 2021-06-26 | End: 2021-06-26 | Stop reason: HOSPADM

## 2021-06-26 RX ORDER — DEXMEDETOMIDINE HYDROCHLORIDE 100 UG/ML
INJECTION, SOLUTION INTRAVENOUS AS NEEDED
Status: DISCONTINUED | OUTPATIENT
Start: 2021-06-26 | End: 2021-06-26 | Stop reason: HOSPADM

## 2021-06-26 RX ORDER — PROPOFOL 10 MG/ML
INJECTION, EMULSION INTRAVENOUS
Status: DISCONTINUED | OUTPATIENT
Start: 2021-06-26 | End: 2021-06-26 | Stop reason: HOSPADM

## 2021-06-26 RX ORDER — ONDANSETRON 2 MG/ML
4 INJECTION INTRAMUSCULAR; INTRAVENOUS AS NEEDED
Status: DISCONTINUED | OUTPATIENT
Start: 2021-06-26 | End: 2021-06-26 | Stop reason: HOSPADM

## 2021-06-26 RX ORDER — SODIUM CHLORIDE 0.9 % (FLUSH) 0.9 %
5-40 SYRINGE (ML) INJECTION AS NEEDED
Status: DISCONTINUED | OUTPATIENT
Start: 2021-06-26 | End: 2021-06-26 | Stop reason: HOSPADM

## 2021-06-26 RX ORDER — BUPIVACAINE HYDROCHLORIDE AND EPINEPHRINE 5; 5 MG/ML; UG/ML
INJECTION, SOLUTION EPIDURAL; INTRACAUDAL; PERINEURAL AS NEEDED
Status: DISCONTINUED | OUTPATIENT
Start: 2021-06-26 | End: 2021-06-26 | Stop reason: HOSPADM

## 2021-06-26 RX ORDER — AMOXICILLIN AND CLAVULANATE POTASSIUM 875; 125 MG/1; MG/1
1 TABLET, FILM COATED ORAL 2 TIMES DAILY
Qty: 28 TABLET | Refills: 0 | Status: SHIPPED | OUTPATIENT
Start: 2021-06-26 | End: 2021-07-10

## 2021-06-26 RX ORDER — HYDROCODONE BITARTRATE AND ACETAMINOPHEN 7.5; 325 MG/1; MG/1
1 TABLET ORAL
Qty: 12 TABLET | Refills: 0 | Status: SHIPPED | OUTPATIENT
Start: 2021-06-26 | End: 2021-06-29

## 2021-06-26 RX ORDER — FENTANYL CITRATE 50 UG/ML
25 INJECTION, SOLUTION INTRAMUSCULAR; INTRAVENOUS
Status: DISCONTINUED | OUTPATIENT
Start: 2021-06-26 | End: 2021-06-26 | Stop reason: HOSPADM

## 2021-06-26 RX ADMIN — CEFEPIME HYDROCHLORIDE 2 G: 2 INJECTION, POWDER, FOR SOLUTION INTRAVENOUS at 13:07

## 2021-06-26 RX ADMIN — SODIUM CHLORIDE 10 ML: 9 INJECTION, SOLUTION INTRAMUSCULAR; INTRAVENOUS; SUBCUTANEOUS at 05:55

## 2021-06-26 RX ADMIN — CEFEPIME HYDROCHLORIDE 2 G: 2 INJECTION, POWDER, FOR SOLUTION INTRAVENOUS at 05:55

## 2021-06-26 RX ADMIN — AMLODIPINE BESYLATE 10 MG: 5 TABLET ORAL at 10:53

## 2021-06-26 RX ADMIN — Medication 3 AMPULE: at 07:55

## 2021-06-26 RX ADMIN — FENTANYL CITRATE 50 MCG: 50 INJECTION, SOLUTION INTRAMUSCULAR; INTRAVENOUS at 08:06

## 2021-06-26 RX ADMIN — ATENOLOL 100 MG: 50 TABLET ORAL at 10:52

## 2021-06-26 RX ADMIN — FENTANYL CITRATE 50 MCG: 50 INJECTION, SOLUTION INTRAMUSCULAR; INTRAVENOUS at 08:14

## 2021-06-26 RX ADMIN — CLOPIDOGREL BISULFATE 75 MG: 75 TABLET ORAL at 10:53

## 2021-06-26 RX ADMIN — SODIUM CHLORIDE 10 ML: 9 INJECTION, SOLUTION INTRAMUSCULAR; INTRAVENOUS; SUBCUTANEOUS at 13:08

## 2021-06-26 RX ADMIN — VANCOMYCIN HYDROCHLORIDE 1000 MG: 1 INJECTION, POWDER, LYOPHILIZED, FOR SOLUTION INTRAVENOUS at 06:24

## 2021-06-26 RX ADMIN — ONDANSETRON HYDROCHLORIDE 4 MG: 2 INJECTION, SOLUTION INTRAMUSCULAR; INTRAVENOUS at 08:14

## 2021-06-26 RX ADMIN — GLIPIZIDE 10 MG: 5 TABLET ORAL at 10:52

## 2021-06-26 RX ADMIN — METRONIDAZOLE 500 MG: 500 INJECTION, SOLUTION INTRAVENOUS at 10:48

## 2021-06-26 RX ADMIN — PROPOFOL 75 MCG/KG/MIN: 10 INJECTION, EMULSION INTRAVENOUS at 08:09

## 2021-06-26 RX ADMIN — INSULIN LISPRO 20 UNITS: 100 INJECTION, SOLUTION INTRAVENOUS; SUBCUTANEOUS at 11:32

## 2021-06-26 RX ADMIN — MIDAZOLAM HYDROCHLORIDE 1 MG: 1 INJECTION, SOLUTION INTRAMUSCULAR; INTRAVENOUS at 08:06

## 2021-06-26 RX ADMIN — ASPIRIN 81 MG: 81 TABLET, CHEWABLE ORAL at 10:52

## 2021-06-26 RX ADMIN — MIDAZOLAM HYDROCHLORIDE 1 MG: 1 INJECTION, SOLUTION INTRAMUSCULAR; INTRAVENOUS at 08:02

## 2021-06-26 RX ADMIN — DEXMEDETOMIDINE HYDROCHLORIDE 10 MCG: 100 INJECTION, SOLUTION, CONCENTRATE INTRAVENOUS at 08:14

## 2021-06-26 NOTE — BRIEF OP NOTE
Brief Postoperative Note    Patient: Ryan Read  YOB: 1960  MRN: [id number]    Date of Procedure: 6/26/2021     Pre-Op Diagnosis: Abscess and Diabetic ulcer left foot    Post-Op Diagnosis: Same as preoperative diagnosis. Procedure(s):  WOUND CLOSURE LEFT FOOT (URGENT)    Surgeon(s):  Sherri Gonzalez DPM    Surgical Assistant: None    Anesthesia: MAC     Estimated Blood Loss (mL): less than 608     Complications: None    Specimens:   ID Type Source Tests Collected by Time Destination   1 : LEFT FOOT WOUND Wound Foot, left CULTURE, ANAEROBIC, CULTURE, WOUND [facility] 6/26/2021 0838 Microbiology        Implants:   Implant Name Type Inv.  Item Serial No.  Lot No. LRB No. Used Action   [facility]   [id number] [employer]  Left 1 Implanted       Drains: * No LDAs found *    Findings: excellent viable hemorrhagic base no non viable tissue    Electronically Signed by Jimbo Culver DPM on 6/26/2021 at 9:06 AM

## 2021-06-26 NOTE — PROGRESS NOTES
Reviewed discharge instructions with patient with verbalized understanding. Prescriptions sent to pharmacy, pt is aware. Awaiting completion of last IV antibiotic.

## 2021-06-26 NOTE — PROGRESS NOTES
Patient scheduled to discharge home. Aware of follow up appointment's. Clear to discharge from SW standpoint.          Bianca Cazares MSW

## 2021-06-26 NOTE — PROGRESS NOTES
Patient can be discharged when medically stable and with following instructions    1. Keep the bandage clean dry and intact  2. Ambulation on the heel with a post op shoe limited  3. Follow up with Dr. Peter Hall office in one week call [phone] in my [place] office  4.  Can go home with a broad spectrum oral antibiotic like Augmentin for 14 days    Please call with any other questions to my cell [phone] or on perfect serve

## 2021-06-26 NOTE — PERIOP NOTES
TRANSFER - IN REPORT:    Verbal report received from JACE Molina (name) on Monica Boucher  being received from 2123(unit) for ordered procedure      Report consisted of patients Situation, Background, Assessment and   Recommendations(SBAR). Information from the following report(s) SBAR, Kardex, ED Summary, OR Summary, Procedure Summary, Intake/Output, MAR, Accordion, Recent Results, Med Rec Status, Alarm Parameters , Pre Procedure Checklist, Procedure Verification and Quality Measures was reviewed with the receiving nurse. Opportunity for questions and clarification was provided. Assessment completed upon patients arrival to unit and care assumed.

## 2021-06-26 NOTE — DISCHARGE INSTRUCTIONS
HOSPITALIST DISCHARGE INSTRUCTIONS    NAME: Vernon Monte   :  1960   MRN:  [id number]     Date/Time:  2021 11:08 AM    ADMIT DATE: 2021     DISCHARGE DATE: 2021     DISCHARGE DIAGNOSIS:  Infected Diabetic foot ulcer    MEDICATIONS:  · It is important that you take the medication exactly as they are prescribed. · Keep your medication in the bottles provided by the pharmacist and keep a list of the medication names, dosages, and times to be taken in your wallet. · Do not take other medications without consulting your doctor. Pain Management: per above medications    What to do at Home    Recommended diet:  Diabetic Diet    Recommended activity: Activity as tolerated    If you have questions regarding the hospital related prescriptions or hospital related issues [facility] [phone]. If you experience any of the following symptoms then please call your primary care physician or return to the emergency room if you cannot get hold of your doctor:  Fever, chills, nausea, vomiting, diarrhea, change in mentation, falling, bleeding, shortness of breath. Follow Up:  Dr. Adriana Kramer MD  you are to call and set up an appointment to see them in 7-10 days. D/C instructions from Dr. Hemalatha Zuniga:   1. Keep the bandage clean dry and intact  2. Ambulation on the heel with a post op shoe limited  3. Follow up with Dr. Judith Yusuf office in one week call [phone] in my [place] office  4. Can go home with a broad spectrum oral antibiotic like Augmentin for 14 days      Information obtained by :  I understand that if any problems occur once I am at home I am to contact my physician. I understand and acknowledge receipt of the instructions indicated above.                                                                                                                                            Physician's or R.N.'s Signature Date/Time                                                                                                                                              Patient or Representative Signature                                                          Date/Time

## 2021-06-26 NOTE — PROGRESS NOTES
Problem: Falls - Risk of  Goal: *Absence of Falls  Description: Document Devere [place] Fall Risk and appropriate interventions in the flowsheet.   Outcome: Progressing Towards Goal  Note: Fall Risk Interventions:  Mobility Interventions: Patient to call before getting OOB         Medication Interventions: Patient to call before getting OOB

## 2021-06-26 NOTE — DISCHARGE SUMMARY
Hospitalist Discharge Summary     Patient ID:  Ingrid Allan  [id number]  47 y.o.  1960  6/21/2021    PCP on record: Yuly Jackson MD    Admit date: 6/21/2021  Discharge date and time: 6/26/2021    DISCHARGE DIAGNOSIS:    Left foot cellulitis POA  With chronic left foot diabetic ulcer-nonhealing POA  DM2 with hyperglycemia  SHELBY vs CKD POA  Chronic Lymphedema of both lower extremities POA   Left ilac artery stenosis  Hypertension  Tobacco abuse disorder POA        Morbid Obesity POA  Weight loss recommended    CONSULTATIONS:  IP CONSULT TO VASCULAR SURGERY  IP CONSULT TO PODIATRY    Excerpted HPI from H&P of Madelin Smart MD:  Ingrid Allan is a 63 y.o.  male who presents with above complaints from home at the instructions of Dr. Margi Joseph for wound check. Patient present with chief complaint of nonhealing left foot plantar wound for the past 2 to 3 weeks. History of having left iliac artery stenosis status post stent placed by vascular surgery Dr. Margi Joseph in January 2021. Patient has history of chronic lymphedema of both legs at baseline. Patient was found to have elevated WBC count at 14.1 with elevated CRP and x-ray left foot showing diffuse soft tissue swelling with a chronic plantar/midfoot wound on the work-up in the ED.     We were asked to admit for work up and evaluation of the above problems.        ______________________________________________________________________  DISCHARGE SUMMARY/HOSPITAL COURSE:  for full details see H&P, daily progress notes, labs, consult notes.        Left foot cellulitis POA  With chronic left foot diabetic ulcer-nonhealing POA  -appreciate podiatry and vascular surgery consults  -s/p I&D of Lt foot wound 6/22 and wound closure on 6/26  -medial heel/mid foot abscess explored with release of purulent fluid per DPM  -Cultures grew Gram positive cocci in clusters and few gram negative rods  -Pt received VANCO/CEFEPIME and Flagyl for one week  Transition to oral augmentin for 2 weeks per podiatry recommendation  MRI Lt fot 6/21:  1. Soft tissue wound of the plantar aspect of the midfoot with an underlying  fluid collection extending medially in the subcutaneous tissues. 2. Nonspecific extensive diffuse subcutaneous edema. 3. Small metallic foreign body adjacent to the fifth metatarsal head and  subcutaneous tissues. This is not adjacent to the wound. 4. Severe degenerative changes in the midfoot with edema likely related to  Charcot arthropathy. Septic arthritis can have a similar appearance, but is  unlikely given that the fluid collection does not extend to the osseous  structures of the midfoot and there is no significant joint effusion.        DM2 with hyperglycemia    -cont lantus 74u qhs. Increase mealtime insulin to 20u tid w meals as per home regimen  -last A1C 5.9 6/11/21  -Resume home meds   SHELBY vs CKD POA  -Cr 1.8 on admission, no prior Cr on file  -diabetic nephropathy?  -repeat labs with Cr 1.1, cont to monitor, renally dose meds       Chronic Lymphedema of both lower extremities POA   -appreciate wound care team following  -follows OP in lymphedema clinic     Left ilac artery stenosis  -s/p stent 1/2021 per patient  C/w aspirin and plavix     Hypertension  -cont BB, CCB  -resume vasotec, HCTZ    Tobacco abuse disorder POA     -X-ray left foot= chronic plantar wound/midfoot with diffuse soft tissue swelling  -CRP >9.5        Morbid Obesity POA  Weight loss recommended      _______________________________________________________________________  Patient seen and examined by me on discharge day. Pertinent Findings:  Gen:    Not in distress  Chest: Clear lungs  CVS:   Regular rhythm.   No edema  Abd:  Soft, not distended, not tender  Neuro:  Alert, oriented x4  _______________________________________________________________________  DISCHARGE MEDICATIONS:   Current Discharge Medication List      START taking these medications    Details aspirin 81 mg chewable tablet Take 1 Tablet by mouth daily. Qty: 30 Tablet, Refills: 2  Start date: 6/27/2021      clopidogreL (PLAVIX) 75 mg tab Take 1 Tablet by mouth daily. Qty: 30 Tablet, Refills: 2  Start date: 6/27/2021      HYDROcodone-acetaminophen (NORCO) 7.5-325 mg per tablet Take 1 Tablet by mouth every six (6) hours as needed for Pain for up to 3 days. Max Daily Amount: 4 Tablets. Indications: pain, left foot pain  Qty: 12 Tablet, Refills: 0  Start date: 6/26/2021, End date: 6/29/2021    Associated Diagnoses: Non-healing ulcer of left foot with necrosis of muscle ([facility])      amoxicillin-clavulanate (Augmentin) 875-125 mg per tablet Take 1 Tablet by mouth two (2) times a day for 14 days. Qty: 28 Tablet, Refills: 0  Start date: 6/26/2021, End date: 7/10/2021         CONTINUE these medications which have NOT CHANGED    Details   !! semaglutide (Ozempic) 0.25 mg/0.2 mL (2 mg/1.5 mL) sub-q pen 0.25 mg by SubCUTAneous route every seven (7) days. Qty: 1 Pen, Refills: 3    Comments: 3 months supply please  Associated Diagnoses: Type 2 diabetes mellitus with microalbuminuria, with long-term current use of insulin ([facility])      insulin lispro (HumaLOG KwikPen Insulin) 100 unit/mL kwikpen INJECT 20 UNITS SUBCUTANEOUSLY 3 TIMES A DAY WITH MEALS  Qty: 20 Adjustable Dose Pre-filled Pen Syringe, Refills: 3      furosemide (LASIX) 20 mg tablet Take 1 Tab by mouth daily. Indications: visible water retention  Qty: 90 Tab, Refills: 3    Associated Diagnoses: Type 2 diabetes mellitus with microalbuminuria, with long-term current use of insulin ([address]      ! ! semaglutide (Ozempic) 0.25 mg/0.2 mL (2 mg/1.5 mL) sub-q pen 0.25 mg by SubCUTAneous route every seven (7) days.   Qty: 1 Pen, Refills: 2    Associated Diagnoses: Type 2 diabetes mellitus with microalbuminuria, with long-term current use of insulin ([facility])      OneTouch Ultra Blue Test Strip strip CHECK BLOOD SUGAR ONCE DAILY      amLODIPine (NORVASC) 10 mg tablet Take 10 mg by mouth daily. Refills: 1      atenolol (TENORMIN) 100 mg tablet Take 100 mg by mouth daily. Refills: 1      enalapril (VASOTEC) 20 mg tablet Take 20 mg by mouth daily. Refills: 0      glipiZIDE SR (GLUCOTROL XL) 10 mg CR tablet Take 10 mg by mouth two (2) times a day. Refills: 0      hydroCHLOROthiazide (HYDRODIURIL) 25 mg tablet take 1 tablet by mouth once daily  Refills: 0      BASAGLAR KWIKPEN U-100 INSULIN 100 unit/mL (3 mL) inpn 74 Units by SubCUTAneous route daily. Refills: 0      metFORMIN (GLUCOPHAGE) 1,000 mg tablet 1,000 mg two (2) times a day. Refills: 0      pravastatin (PRAVACHOL) 80 mg tablet Take 80 mg by mouth nightly. Refills: 0       !! - Potential duplicate medications found. Please discuss with provider. Patient Follow Up Instructions:    Activity: Activity as tolerated  Diet: Diabetic Diet  Wound Care: Keep wound clean and dry    Follow-up with PCP and podiatry in one we   Follow-up tests/labs none  Follow-up Information     Follow up With Specialties Details Why Contact Info    Andres Plaza MD Family Medicine In 1 week [facility] follow-up [address] [address] [phone]      [facility], [address] [facility], Orthopedic Surgery In 1 week  [address]  [phone]          ________________________________________________________________    Risk of deterioration: High    Condition at Discharge:  Stable  __________________________________________________________________    Disposition  Home with family, no needs    ____________________________________________________________________    Code Status: Full Code  ___________________________________________________________________      Total time in minutes spent coordinating this discharge (includes going over instructions, follow-up, prescriptions, and preparing report for sign off to her PCP) :  40 minutes    Signed:  Griffin Baxter MD

## 2021-06-26 NOTE — PERIOP NOTES
Handoff Report from Operating Room to PACU    Report received from Jason De Jesus CRNA and EMILIA David RN regarding Donnie Hale. Surgeon(s):  Lainey Khan DPM  And Procedure(s) (LRB):  WOUND CLOSURE LEFT FOOT (URGENT) (Left)  confirmed   with allergies and dressings discussed. Anesthesia type, drugs, patient history, complications, estimated blood loss, vital signs, intake and output, and last pain medication, lines, reversal medications and temperature were reviewed. Verbal sign-out received by Dr. Paul Cruz. TRANSFER - OUT REPORT:    Verbal report given to Fernando Houston RN (name) on Donnie Hale  being transferred to [facility] (unit) for routine post - op       Report consisted of patients Situation, Background, Assessment and   Recommendations(SBAR). Information from the following report(s) SBAR, Kardex, OR Summary, Procedure Summary, Intake/Output, MAR, Recent Results, Med Rec Status and Cardiac Rhythm NSR was reviewed with the receiving nurse. Lines:   Peripheral IV 06/25/21 Right Forearm (Active)   Site Assessment Clean, dry, & intact 06/26/21 0902   Phlebitis Assessment 0 06/26/21 0902   Infiltration Assessment 0 06/26/21 0902   Dressing Status Clean, dry, & intact 06/26/21 0902   Dressing Type Tape;Transparent 06/26/21 0902   Hub Color/Line Status Pink;Flushed; Infusing 06/26/21 0902   Action Taken Other (comment) 06/25/21 1209   Alcohol Cap Used Yes 06/25/21 1241        Opportunity for questions and clarification was provided.       Patient transported with:   Registered Nurse  Tech, socks returned to patient

## 2021-06-26 NOTE — PROGRESS NOTES
1900--bedside report received from Clay Jacob rn pt resting in bed oriented x 4, has no complaints at this time call bell in reach assessment as noted    2200--pt sitting up in bed taking pills with juice, reminded of npo status after midnight, call bell in reach     0200--am labs drawn and sent to lab per orders, call bell in reach'    0500--CHG bath complete, remains NPO    0700--bedside report given to hung paulino, preop holding,     0700--bedside report given to Clay Jacob rn who is assuming care of pt

## 2021-06-26 NOTE — ANESTHESIA POSTPROCEDURE EVALUATION
Procedure(s):  WOUND CLOSURE LEFT FOOT (URGENT). MAC, total IV anesthesia    Anesthesia Post Evaluation        Patient location during evaluation: PACU  Note status: Adequate. Level of consciousness: responsive to verbal stimuli and sleepy but conscious  Pain management: satisfactory to patient  Airway patency: patent  Anesthetic complications: no  Cardiovascular status: acceptable  Respiratory status: acceptable  Hydration status: acceptable  Comments: +Post-Anesthesia Evaluation and Assessment    Patient: Kati Munson MRN: [id number]  SSN: [id number]   YOB: 1960  Age: 63 y.o. Sex: male      Cardiovascular Function/Vital Signs    BP (!) 159/78   Pulse 69   Temp 36.6 °C (97.9 °F)   Resp 16   Ht 6' (1.829 m)   Wt 150.3 kg (331 lb 5.6 oz)   SpO2 96%   BMI 44.94 kg/m²     Patient is status post Procedure(s):  WOUND CLOSURE LEFT FOOT (URGENT). Nausea/Vomiting: Controlled. Postoperative hydration reviewed and adequate. Pain:  Pain Scale 1: Numeric (0 - 10) (06/26/21 0949)  Pain Intensity 1: 0 (06/26/21 0949)   Managed. Neurological Status:   Neuro (WDL): Exceptions to WDL (06/26/21 0902)   At baseline. Mental Status and Level of Consciousness: Arousable. Pulmonary Status:   O2 Device: None (Room air) (06/26/21 0949)   Adequate oxygenation and airway patent. Complications related to anesthesia: None    Post-anesthesia assessment completed. No concerns.     Signed By: Anthony Kessler MD    6/26/2021  Post anesthesia nausea and vomiting:  controlled      INITIAL Post-op Vital signs:   Vitals Value Taken Time   /78 06/26/21 0949   Temp 36.6 °C (97.9 °F) 06/26/21 0949   Pulse 69 06/26/21 0949   Resp 16 06/26/21 0949   SpO2 96 % 06/26/21 0949

## 2021-06-26 NOTE — PROGRESS NOTES
Problem: Falls - Risk of  Goal: *Absence of Falls  Description: Document Joy Puckett Fall Risk and appropriate interventions in the flowsheet.   Outcome: Progressing Towards Goal  Note: Fall Risk Interventions:  Mobility Interventions: Patient to call before getting OOB         Medication Interventions: Teach patient to arise slowly

## 2021-06-27 LAB
BACTERIA SPEC CULT: ABNORMAL
SERVICE CMNT-IMP: ABNORMAL

## 2021-06-28 NOTE — OP NOTES
[address]  OPERATIVE REPORT    Name:  Sue Mejia  MR#:  [id number]  :  1960  ACCOUNT #:  [de-identified]  DATE OF SERVICE:  2021    PREOPERATIVE DIAGNOSIS:  Abscess, diabetic ulcer, left foot. POSTOPERATIVE DIAGNOSIS:  Abscess, diabetic ulcer, left foot. PROCEDURE PERFORMED:   Wound closure, left foot. SURGEON:  Kt Funez DPM    ASSISTANT:  None. ANESTHESIA:  MAC.    COMPLICATIONS:  None. SPECIMENS REMOVED:  Aerobic, anaerobic and Gram stain cultures taken. IMPLANTS:  Stravix graft was implanted in the wound bed. ESTIMATED BLOOD LOSS:  Less than 100 mL. DRAINS:  None. PROCEDURE:  The patient was brought into the OR and placed on the OR table in supine position. IV sedation was performed as per CRNA and the local infiltration of 0.5% Marcaine plain injected 10 mL and intraop 8 mL of 0.5% Marcaine with epinephrine was injected to control the hemorrhage. The left foot was prepped and draped in the usual sterile manner. After anesthesia check, the attention was directed to the plantar left foot wound and the incision which was partially left open from the previous debridement and I and D. The retention sutures were removed and the area was cleaned off macerated devitalized tissue and skin using sharp and blunt dissection with #15 sterile blade and a rongeur. A curette was used to debride the deeper tracking areas and removed any diseased nonviable tissue as well as to stimulate hemorrhage into the wound bed. At this time, deep cultures were taken before the debridement started and area was copiously irrigated with Irrisept and normal saline after removal of the necrotic and nonviable tissue.   At this time, the Stravix graft was implanted into the wound bed into the tracking areas as well and sutured in with 4-0 Vicryl sutures and the deep layers of the plantar fat pad was also sutured with 4-0 Vicryl and skin was closed with a 2-0 nylon mattress sutures. The patient tolerated anesthesia well without any complications. The mild compressive bandage was applied to control the hemorrhage and the patient was given discharge instructions to be sent back to floor and discharged to home when medically stable.         PAIGE [email or web address]  D:  06/28/2021 13:26  T:  06/28/2021 14:46  JOB #:  [id number]

## 2021-06-29 LAB
BACTERIA SPEC CULT: ABNORMAL
BACTERIA SPEC CULT: ABNORMAL
BACTERIA SPEC CULT: NORMAL
GRAM STN SPEC: ABNORMAL
GRAM STN SPEC: ABNORMAL
SERVICE CMNT-IMP: ABNORMAL
SERVICE CMNT-IMP: NORMAL

## 2021-07-01 NOTE — ADT AUTH CERT NOTES
Cellulitis - Care Day 5 (6/25/2021) by Erica Rushing RN       Review Status Review Entered   Completed 6/30/2021 14:55      Criteria Review      Care Day: 5 Care Date: 6/25/2021 Level of Care: Inpatient Floor    Guideline Day 3    Clinical Status    (X) * Hemodynamic stability    6/30/2021 14:55:43 EDT by Johan Witt      Pulse 70  Resp 17  /73  O2 sat 97% RA    (X) * Afebrile or fever improved    6/30/2021 14:55:43 EDT by Johan Witt      Temp 98.4    ( ) * Skin exam stable or improved    (X) * Mental status at baseline    6/30/2021 14:55:43 EDT by Johan Witt      Neurologic:       Alert and oriented X 3, normal speech,   Psych:   Good insight.  Not anxious nor agitated    ( ) * Antibiotic treatment needs appropriate for next level of care    (X) * Pain absent or manageable at next level of care    6/30/2021 14:55:43 EDT by Johan Witt      none noted    ( ) * Discharge plans and education understood    Activity    (X) * Ambulatory or acceptable for next level of care    6/30/2021 14:55:43 EDT by Johan Witt      Up ad andrew    Routes    (X) * Oral hydration    6/30/2021 14:55:43 EDT by Johan Witt      Regular 3 carb choices (45 gm/meal) diet    (X) * Oral medications or regimen acceptable for next level of care    6/30/2021 14:55:43 EDT by Johan Witt      Norvasc 10mg po q day  ASA 81mg po q day  Tenormin 100mg po q day  Plavix 75mg po q day  Glucotrol 10 mg po 2x/day  Pravachol 80mg po q hs    (X) * Oral diet or acceptable for next level of care    6/30/2021 14:55:43 EDT by Johan Witt      Regular 3 carb choices (45 gm/meal) diet    Interventions    (X) WBC    6/30/2021 14:55:43 EDT by Johan Witt      WBC10.3    Medications    (X) Parenteral or oral antibiotics    6/30/2021 14:55:43 EDT by Johan Witt      Maxipime 2 gm IV q 8hrs  Flagyl 500mg IV q 12hrs  Vanco 1,750mg IV q 12hrs    * Milestone   Additional Notes   6/25/21   Hospitalist Progress Note:   Patient resting in bed. No acute distress          PHYSICAL EXAM:   General:          WD, WN. Alert, cooperative, no acute distress     EENT:              EOMI. Anicteric sclerae. MMM   Resp:               CTA bilaterally, no wheezing or rales.  No accessory muscle use   CV:                  Regular  rhythm,  No edema   GI:                   Soft, Non distended, Non tender.  +Bowel sounds   Skin:                No rashes.  No jaundice. MSK:               Lt foot + surgical dressings in place, ACE bandage covering          Assessment / Plan:   Left foot cellulitis POA   With chronic left foot diabetic ulcer-nonhealing POA   -appreciate podiatry and vascular surgery consults   -s/p I&D of Lt foot wound 6/22   -medial heel/mid foot abscess explored with release of purulent fluid per DPM   -follow surgical cultures, no growth so far   -cont IV abx with cefepime, vanc, flagyl for now   -wound care and pain control as per podiatry   -appreciate podiatry, plan for wound closure 6/25 noted   -wound cultures 6/22 Gneg rods on thio broth       MRI Lt fot 6/21:   1. Soft tissue wound of the plantar aspect of the midfoot with an underlying   fluid collection extending medially in the subcutaneous tissues. 2. Nonspecific extensive diffuse subcutaneous edema. 3. Small metallic foreign body adjacent to the fifth metatarsal head and   subcutaneous tissues. This is not adjacent to the wound. 4. Severe degenerative changes in the midfoot with edema likely related to   Charcot arthropathy. Septic arthritis can have a similar appearance, but is   unlikely given that the fluid collection does not extend to the osseous   structures of the midfoot and there is no significant joint effusion.           DM2 with hyperglycemia   -cont glipizide at reduced dose 5mg bid, hold metformin   -cont lantus 74u qhs. Increase mealtime insulin to 20u tid w meals as per home regimen   -SSI with POCs   -last A1C 5.9 6/11/21   -holding home Ozempic q. Weekly       SHELBY vs CKD POA   -Cr 1.8 on admission, no prior Cr on file   -diabetic nephropathy?   -repeat labs with Cr 1.63, cont to monitor, renally dose meds   -OP follow up with nephrology, consider inpatient consult if renal function worsens       Chronic Lymphedema of both lower extremities POA    -appreciate wound care team following   -follows OP in lymphedema clinic       Left ilac artery stenosis   -s/p stent 1/2021 per patient       Hypertension   -cont BB, CCB   -hold vasotec, HCTZ , monitor BP       Tobacco abuse disorder POA       -X-ray left foot= chronic plantar wound/midfoot with diffuse soft tissue swelling   -CRP >9.5           Morbid Obesity POA   Weight loss recommended           Code Status: Full code as per patient's wishes   Surrogate Decision Maker: Miryam  (significant other) as per patient       Anticipated DC 6/27   Barriers to DC: awaiting cultures       DVT Prophylaxis: Subcu heparin   GI Prophylaxis: not indicated       Baseline: Patient lives with significant other at home, independent with ADLs, has been dealing with chronic lymphedema of both legs          Podiatry Progress Note:      Reviewed the results and data and appreciated       scheduled for wound closure and application of the Stravix graft tomorrow AM       Can be discharged on Sunday if medically stable          GLUCOSE,FAST -  (H) 210 (H) 91 143 (H)         WBC 10.3    HGB 13.2    HCT 40.4           *    K 4.1        CO2 26    *    BUN 44*    CREA 1.80*    CA 8.0*          No imaging      Orders   Lantus 74 units SQ q hs   SSI   Humalog 20 units SQ 3x/day   Wound care:     Change the dressing and remove packing    Please take a picture and if needed repack the wound and apply Opticell, 4/4, kirlex, and ace wrap   SCDs         Cellulitis - Care Day 4 (6/24/2021) by Hamlet Cortes, RN       Review Status Review Entered   Completed 6/30/2021 14:44      Criteria Review      Care Day: 4 Care Date: 6/24/2021 Level of Care: Inpatient Floor    Guideline Day 3    Clinical Status    (X) * Hemodynamic stability    6/30/2021 14:44:34 EDT by Bradly Conner      Pulse 97  Resp 18  /68  O2 sat 98% RA    (X) * Afebrile or fever improved    6/30/2021 14:44:34 EDT by Bradly Conner      Temp 98.2    ( ) * Skin exam stable or improved    (X) * Mental status at baseline    6/30/2021 14:44:34 EDT by Bradly Conner      Neurologic:       Alert and oriented X 3, normal speech,   Psych:   Good insight. Not anxious nor agitated    ( ) * Antibiotic treatment needs appropriate for next level of care    (X) * Pain absent or manageable at next level of care    6/30/2021 14:44:34 EDT by Ha Morrow Denies foot pain    ( ) * Discharge plans and education understood    Activity    (X) * Ambulatory or acceptable for next level of care    6/30/2021 14:44:34 EDT by Bradly Conner      Up ad andrew    Routes    (X) * Oral hydration    6/30/2021 14:44:34 EDT by Bradly Conner      Regular 3 carb choices (45 gm/meal) diet    (X) * Oral medications or regimen acceptable for next level of care    6/30/2021 14:44:34 EDT by Bradly Conner      Norvasc 10mg po q day  ASA 81mg po q day  Tenormin 100mg po q day  Plavix 75mg po q day  Glucotrol 10 mg po 2x/day  Pravachol 80mg po q hs    (X) * Oral diet or acceptable for next level of care    6/30/2021 14:44:34 EDT by Bradly Conner      Regular 3 carb choices (45 gm/meal) diet    Medications    (X) Parenteral or oral antibiotics    6/30/2021 14:44:34 EDT by Bradly Conner      Maxipime 2 gm IV q 8hrs  Flagyl 500mg IV q 12hrs  Vanco 1,750mg IV q 12hrs    * Milestone   Additional Notes   6/24/21   Hospitalist Progress Note      No new complaints. Denies foot pain, denies fevers/chills      PHYSICAL EXAM:   General:          WD, WN. Alert, cooperative, no acute distress     EENT:              EOMI. Anicteric sclerae.  MMM   Resp:               CTA bilaterally, no wheezing or rales.  No accessory muscle use   CV:                  Regular  rhythm,  No edema   GI:                   Soft, Non distended, Non tender.  +Bowel sounds   Skin:                No rashes.  No jaundice. MSK:               Lt foot + surgical dressings in place, ACE bandage covering          Assessment / Plan:   Left foot cellulitis POA   With chronic left foot diabetic ulcer-nonhealing POA   -appreciate podiatry and vascular surgery consults   -s/p I&D of Lt foot wound 6/22   -medial heel/mid foot abscess explored with release of purulent fluid per DPM   -follow surgical cultures, no growth so far   -cont IV abx with cefepime, vanc, flagyl for now   -wound care and pain control as per podiatry   -repeat CBC in am, leukocytosis noted   -no role for vascular surg intervention now, high risk for amputation   -CRP 9.5       MRI Lt fot 6/21:   1. Soft tissue wound of the plantar aspect of the midfoot with an underlying   fluid collection extending medially in the subcutaneous tissues. 2. Nonspecific extensive diffuse subcutaneous edema. 3. Small metallic foreign body adjacent to the fifth metatarsal head and   subcutaneous tissues. This is not adjacent to the wound. 4. Severe degenerative changes in the midfoot with edema likely related to   Charcot arthropathy. Septic arthritis can have a similar appearance, but is   unlikely given that the fluid collection does not extend to the osseous   structures of the midfoot and there is no significant joint effusion.           DM2 with hyperglycemia   -cont glipizide at reduced dose 5mg bid, hold metformin   -cont lantus 74u qhs. Increase mealtime insulin to 20u tid w meals as per home regimen   -SSI with POCs   -last A1C 5.9 6/11/21   -holding home Ozempic q.  Weekly       SHELBY vs CKD POA   -Cr 1.8 on admission, no prior Cr on file   -diabetic nephropathy?   -repeat labs with Cr 1.63, cont to monitor, renally dose meds   -OP follow up with nephrology, consider inpatient consult if renal function worsens       Chronic Lymphedema of both lower extremities POA    -appreciate wound care team following   -follows OP in lymphedema clinic       Left ilac artery stenosis   -s/p stent 1/2021 per patient       Hypertension   -cont BB, CCB   -hold vasotec, HCTZ , monitor BP       Tobacco abuse disorder POA       -X-ray left foot= chronic plantar wound/midfoot with diffuse soft tissue swelling   -CRP >9.5           Morbid Obesity POA   Weight loss recommended           Code Status: Full code as per patient's wishes   Surrogate Decision Maker: Emelina Dexter (significant other) as per patient       DVT Prophylaxis: Subcu heparin   GI Prophylaxis: not indicated       Baseline: Patient lives with significant other at home, independent with ADLs, has been dealing with chronic lymphedema of both legs          GLUCOSE,FAST -  (H) 351 (H) 265 (H) 202 (H)      No imaging      Orders   Lantus 74 units SQ q hs   SSI   Humalog 15 units SQ 3x/day   Wound care:     Change the dressing and remove packing    Please take a picture and if needed repack the wound and apply Opticell, 4/4, kirlex, and ace wrap   SCDs         Cellulitis - Care Day 3 (6/23/2021) by Goyo Molina RN       Review Status Review Entered   Completed 6/30/2021 14:33      Criteria Review      Care Day: 3 Care Date: 6/23/2021 Level of Care: Inpatient Floor    Guideline Day 3    Clinical Status    (X) * Hemodynamic stability    6/30/2021 14:33:51 EDT by Yordan Coleman      Pulse 80  Resp 17  /63  O2 sat 96% RA    (X) * Afebrile or fever improved    6/30/2021 14:33:51 EDT by Yordan Coleman      Temp 98.0    ( ) * Skin exam stable or improved    (X) * Mental status at baseline    6/30/2021 14:33:51 EDT by Yordan Coleman      Neurologic:       Alert and oriented X 3, normal speech    ( ) * Antibiotic treatment needs appropriate for next level of care    (X) * Pain absent or manageable at next level of care    6/30/2021 14:33:52 EDT by Eduardo Camarena      none noted    ( ) * Discharge plans and education understood    Activity    (X) * Ambulatory or acceptable for next level of care    6/30/2021 14:33:52 EDT by Eduardo Camarena      Up ad andrew    Routes    (X) * Oral hydration    6/30/2021 14:33:52 EDT by Eduardo Camarena      Regular diet    (X) * Oral medications or regimen acceptable for next level of care    (X) * Oral diet or acceptable for next level of care    6/30/2021 14:33:52 EDT by Eduardo Camarena      Regular diet    Interventions    (X) WBC    6/30/2021 14:33:52 EDT by Eduardo Camarena      USR08.6*    Medications    (X) Parenteral or oral antibiotics    6/30/2021 14:33:52 EDT by Eduardo Camarena      Maxipime 2 gm IV q 8hrs  Flagyl 500mg IV q 12hrs  Vanco 2,000 mg IV q 24hrs    * Milestone   Additional Notes   6/23/21   Hospitalist Progress Note      Patient sitting in bed, foot elevated       PHYSICAL EXAM:   General:          WD, WN. Alert, cooperative, no acute distress     EENT:              EOMI. Anicteric sclerae. MMM   Resp:               CTA bilaterally, no wheezing or rales.  No accessory muscle use   CV:                  Regular  rhythm,  No edema   GI:                   Soft, Non distended, Non tender.  +Bowel sounds   Neurologic:       Alert and oriented X 3, normal speech,    Psych:   Good insight. Not anxious nor agitated   Skin:                No rashes.  No jaundice.    MSK:               Lt foot + surgical dressings in place, ACE bandage covering          Assessment / Plan:   Left foot cellulitis POA   With chronic left foot diabetic ulcer-nonhealing POA   -appreciate podiatry and vascular surgery consults   -s/p I&D of Lt foot wound last night (6/22)   -medial heel/mid foot abscess explored with release of purulent fluid per DPM   -follow surgical cultures   -cont IV abx with cefepime, vanc, flagyl for now   -await surgical cultures, adjust abx based on cultures   -wound care   -pain currently adequately controlled   -no role for vascular surg intervention now, high risk for amputation   -CRP 9.5   -WBC 14.1 POA--> 11.6   -Afebrile       MRI Lt fot 6/21:   1. Soft tissue wound of the plantar aspect of the midfoot with an underlying   fluid collection extending medially in the subcutaneous tissues. 2. Nonspecific extensive diffuse subcutaneous edema. 3. Small metallic foreign body adjacent to the fifth metatarsal head and   subcutaneous tissues. This is not adjacent to the wound. 4. Severe degenerative changes in the midfoot with edema likely related to   Charcot arthropathy. Septic arthritis can have a similar appearance, but is   unlikely given that the fluid collection does not extend to the osseous   structures of the midfoot and there is no significant joint effusion.           DM2 with hyperglycemia   -cont glipizide at reduced dose 5mg bid, hold metformin   -cont lantus 74u qhs and SSI with POCs   -last A1C 5.9 6/11/21   -holding home Ozempic q.  Weekly       SHELBY vs CKD POA   -Cr 1.8 on admission, no prior Cr on file   -diabetic nephropathy?   -repeat labs with Cr 1.63, cont to monitor, renally dose meds   -OP follow up with nephrology, consider inpatient consult if renal function worsens       Chronic Lymphedema of both lower extremities POA    -appreciate wound care team following   -follows OP in lymphedema clinic       Left ilac artery stenosis   -s/p stent 1/2021 per patient       Hypertension   -cont BB, CCB   -hold vasotec, HCTZ , monitor BP       Tobacco abuse disorder POA       -X-ray left foot= chronic plantar wound/midfoot with diffuse soft tissue swelling   -CRP >9.5           Morbid Obesity POA   Weight loss recommended           Code Status: Full code as per patient's wishes   Surrogate Decision Maker: Laure Casas (significant other) as per patient       DVT Prophylaxis: Subcu heparin   GI Prophylaxis: not indicated       Baseline: Patient lives with significant other at home, independent with ADLs, has been dealing with chronic lymphedema of both legs          Hospitalist Progress Note:      Received notification from bedside RN about patient with regards to: , needs Lispro coverage order       Intervention given: Lispro 3 units SQ x 1 dose ordered      GLUCOSE,FAST -  (H) 390 (H) 275 (H) 368 (H)      WBC 14.6*    HGB 12.2    HCT 37.6           *    K 5.1        CO2 25    *    BUN 34*    CREA 1.74*    CA 8.7          No imaging      Orders   Humalog 3 units SQ x 1 dose   Norvasc 10mg po q day   ASA 81mg po q day   Tenormin 100mg po q day   Plavix 75mg po q day   Glucotrol 5mg po 2x/day   Lantus 74 units SQ q hs   SSI   Humalog 15 units SQ 3x/day   Pravachol 80mg po q hs   Wound care:  Keep bandage clean dry and intact reinforce if needed   SCDs

## 2021-07-23 DIAGNOSIS — Z79.4 TYPE 2 DIABETES MELLITUS WITH MICROALBUMINURIA, WITH LONG-TERM CURRENT USE OF INSULIN (HCC): ICD-10-CM

## 2021-07-23 DIAGNOSIS — R80.9 TYPE 2 DIABETES MELLITUS WITH MICROALBUMINURIA, WITH LONG-TERM CURRENT USE OF INSULIN (HCC): ICD-10-CM

## 2021-07-23 DIAGNOSIS — E11.29 TYPE 2 DIABETES MELLITUS WITH MICROALBUMINURIA, WITH LONG-TERM CURRENT USE OF INSULIN (HCC): ICD-10-CM

## 2021-07-23 RX ORDER — FUROSEMIDE 20 MG/1
TABLET ORAL
Qty: 90 TABLET | Refills: 3 | Status: SHIPPED | OUTPATIENT
Start: 2021-07-23

## 2021-08-03 ENCOUNTER — HOSPITAL ENCOUNTER (OUTPATIENT)
Dept: WOUND CARE | Age: 61
Discharge: HOME | End: 2021-08-03
Attending: PODIATRIST
Payer: COMMERCIAL

## 2021-08-03 VITALS
SYSTOLIC BLOOD PRESSURE: 150 MMHG | RESPIRATION RATE: 18 BRPM | OXYGEN SATURATION: 100 % | DIASTOLIC BLOOD PRESSURE: 76 MMHG | HEART RATE: 66 BPM | TEMPERATURE: 98.1 F

## 2021-08-03 PROCEDURE — 11042 DBRDMT SUBQ TIS 1ST 20SQCM/<: CPT

## 2021-08-03 PROCEDURE — 11043 DBRDMT MUSC&/FSCA 1ST 20/<: CPT

## 2021-08-03 NOTE — WOUND CARE
a  [address]   Progress Note and Procedure Note     [address] [facility] [place] [facility]ORD NUMBER:  [id number]  AGE: 63 y.o. RACE WHITE/NON-  GENDER: male  : 1960  EPISODE DATE:  8/3/2021    Subjective:     Chief Complaint   Patient presents with    Wound Check    patient is known to my practice has been surgically treated with I&D at [address] referred here for wound care      HISTORY of PRESENT ILLNESS HPI    [email or web address] Verna Allan is a 63 y.o. male who presents today for wound/ulcer evaluation. History of Wound Context: diabetic and post I&D abscess also has Charcot foot left foot  Wound/Ulcer Pain Timing/Severity: none  Quality of pain:   Severity:  0 / 10   Modifying Factors: None  Associated Signs/Symptoms: none    Ulcer Identification:  Ulcer Type: diabetic and neuropathic    Contributing Factors: edema, venous stasis, lymphedema, diabetes, obesity and charcot foot deformity     Wound: full thickness with deep into the muscle layer across the plantar mid foot         PAST MEDICAL HISTORY    Past Medical History:   Diagnosis Date    Cellulitis of left foot     DM (diabetes mellitus) ([facility] [address]     HTN (hypertension)     Iliac artery stenosis, left (HCC)     Lymphedema of both lower extremities     Smoker         PAST SURGICAL HISTORY    No past surgical history on file. FAMILY HISTORY    No family history on file. SOCIAL HISTORY    Social History     Tobacco Use    Smoking status: Current Every Day Smoker     Packs/day: 1.00     Years: 30.00     Pack years: 30.00    Smokeless tobacco: Never Used   Substance Use Topics    Alcohol use:  Yes     Alcohol/week: 4.0 standard drinks     Types: 4 Cans of beer per week     Comment: social    Drug use: Never       ALLERGIES    No Known Allergies    MEDICATIONS    Current Outpatient Medications on File Prior to Encounter   Medication Sig Dispense Refill    furosemide (LASIX) 20 mg tablet TAKE 1 TABLET BY MOUTH DAILY 90 Tablet 3    aspirin 81 mg chewable tablet Take 1 Tablet by mouth daily. 30 Tablet 2    clopidogreL (PLAVIX) 75 mg tab Take 1 Tablet by mouth daily. 30 Tablet 2    semaglutide (Ozempic) 0.25 mg/0.2 mL (2 mg/1.5 mL) sub-q pen 0.25 mg by SubCUTAneous route every seven (7) days. 1 Pen 3    insulin lispro (HumaLOG KwikPen Insulin) 100 unit/mL kwikpen INJECT 20 UNITS SUBCUTANEOUSLY 3 TIMES A DAY WITH MEALS 20 Adjustable Dose Pre-filled Pen Syringe 3    semaglutide (Ozempic) 0.25 mg/0.2 mL (2 mg/1.5 mL) sub-q pen 0.25 mg by SubCUTAneous route every seven (7) days. 1 Pen 2    OneTouch Ultra Blue Test Strip strip CHECK BLOOD SUGAR ONCE DAILY      amLODIPine (NORVASC) 10 mg tablet Take 10 mg by mouth daily. 1    atenolol (TENORMIN) 100 mg tablet Take 100 mg by mouth daily. 1    enalapril (VASOTEC) 20 mg tablet Take 20 mg by mouth daily. 0    glipiZIDE SR (GLUCOTROL XL) 10 mg CR tablet Take 10 mg by mouth two (2) times a day.  0    hydroCHLOROthiazide (HYDRODIURIL) 25 mg tablet take 1 tablet by mouth once daily  0    BASAGLAR KWIKPEN U-100 INSULIN 100 unit/mL (3 mL) inpn 74 Units by SubCUTAneous route daily. 0    metFORMIN (GLUCOPHAGE) 1,000 mg tablet 1,000 mg two (2) times a day.  0    pravastatin (PRAVACHOL) 80 mg tablet Take 80 mg by mouth nightly. 0     No current facility-administered medications on file prior to encounter. REVIEW OF SYSTEMS    Pertinent items are noted in HPI. Objective:     Visit Vitals  BP (!) 150/76 (BP 1 Location: Left upper arm)   Pulse 66   Temp 98.1 °F (36.7 °C)   Resp 18   SpO2 100%       Wt Readings from Last 3 Encounters:   06/21/21 150.3 kg (331 lb 5.6 oz)   03/31/21 149.8 kg (330 lb 3.2 oz)   12/11/19 144.3 kg (318 lb 3.2 oz)       PHYSICAL EXAM    Pertinent items are noted in HPI.     Assessment:      Problem List Items Addressed This Visit     None          POP IN PROCEDURE TYPE (DEBRIDEMENT, BIOPSY, I&D, SKIN SUB, CHEMICAL CAUERTY)     Plan:     Treatment Note please see attached Discharge Instructions    Written patient dismissal instructions given to patient or POA. Electronically signed by Bharath Rowley DPM on 8/3/2021 at 7:13 PM              Debridement Wound Care        Problem List Items Addressed This Visit     None          Procedure Note  Indications:  Based on my examination of this patient's wound(s)/ulcer(s) today, debridement is required to promote healing and evaluate the wound base.     Performed by: Bharath Rowley DPM    Consent obtained: Yes    Time out taken: Yes    Debridement: Excisional    Using curette, tissue nippers and # 10 blade scalpel the wound(s)/ulcer(s) was/were sharply debrided down through and including the removal of    epidermis, dermis, subcutaneous tissue and muscle/fascia    Devitalized Tissue Debrided: fibrin, biofilm, slough, necrotic/eschar, exudate and callus    Pre Debridement Measurements:  Are located in the [place]  Documentation Flow Sheet    Wound/Ulcer #: 1    Post Debridement Measurements:  Wound/Ulcer Descriptions are Pre Debridement except measurements:Diabetic ulcer, muscle necrosis    Wound Foot Plantar (Active)   Wound Etiology Diabetic 08/03/21 1458   Wound Length (cm) 4 cm 08/03/21 1458   Wound Width (cm) 9 cm 08/03/21 1458   Wound Depth (cm) 0.3 cm 08/03/21 1458   Wound Surface Area (cm^2) 36 cm^2 08/03/21 1458   Wound Volume (cm^3) 10.8 cm^3 08/03/21 1458   Number of days: 0       Incision 06/26/21 Foot Left (Active)   Number of days: 38        Percent of Wound(s)/Ulcer(s) Debrided: 100%    Total Surface Area Debrided:  approx 36 sq cm see RN's note    Diabetic/Pressure/Non Pressure Ulcers only:  Ulcer:     Estimated Blood Loss:  Estimated amt of blood loss is 10 ml    Hemostasis Achieved: Pressure    Procedural Pain: 0 / 10     Post Procedural Pain: 0 / 10     Response to treatment: Well tolerated by patient

## 2021-08-03 NOTE — ADDENDUM NOTE
Addendum  created 08/03/21 1157 by Faby Palomares MD    Attestation recorded in [address] [place] [address], [facility] 97 filed

## 2021-08-06 NOTE — DISCHARGE INSTRUCTIONS
Discharge Instructions for  Johann0 Pamella Chavez  [address], [facility]  [phone] Fax [phone]    NAME:  Jackie Yao  YOB: 1960  MEDICAL RECORD NUMBER:  [id number]  DATE:  8/3/2021    Wound Cleansing:   Do not scrub or use excessive force. Topical Treatments:  Do not apply lotions, creams, or ointments to wound bed unless directed. Dressings:           Wound Location foot   [] Apply Primary Dressing:       [] MediHoney Gel [] Alginate with Silver [] Alginate   [] Collagen [] Collagen with Silver   [] Santyl with Moisten saline gauze     [] Hydrocolloid   [] MediHoney Alginate [] Foam with Silver   [] Foam   [] Hydrofera Blue    [] Mepilex Border    [] Moisten with Saline [] Hydrogel [] Mepitel     [] Bactroban/Mupirocin [] Polysporin  [] Other:    [] Pack wound loosely with  [] Iodoform   [] Plain Packing  [] Other   [] Cover and Secure with:     [] Gauze [] Leonidas [] Kerlix   [] Ace Wrap [] Cover Roll Tape [] ABD     [] Other:    Avoid contact of tape with skin. [] Change dressing: [] Daily    [] Every Other Day [] Three times per week   [] Once a week [x] Do Not Change Dressing   [] Other:       Edema Control:  Apply: [] Compression Stocking []Right Leg []Left Leg   [] Tubigrip []Right Leg Double Layer []Left Leg Double Layer       []Right Leg Single Layer []Left Leg Single Layer   [] SpandaGrip []Right Leg  []Left Leg      []Low compression 5-10 mm/Hg      []Medium compression 10-20 mm/Hg     []High compression  20-30 mm/Hg   every morning immediately when getting up should be applied to affected leg(s) from mid foot to knee making sure to cover the heel. Remove every night before going to bed. [x] Elevate leg(s) above the level of the heart when sitting. [] Avoid prolonged standing in one place.    [] Elevate arm/hand above the level of the heart []RightArm []LeftArm     Dietary:  [x] Diet as tolerated: [] Calorie Diabetic Diet: [] No Added Salt:  [] Increase Protein: [] Other:   Activity:  [x] Activity as tolerated:  [] Patient has no activity restrictions     [] Strict Bedrest: [] Remain off Work:     [] May return to full duty work:                                   [] Return to work with restrictions:             Return Appointment:  [] Wound and dressing supply provider:   [] ECF or Home Healthcare:  [] Wound Assessment: [] Physician or NP scheduled for Wound Assessment:   [x] Return Appointment: With MD  in  1 Week(s)  [] Ordered tests:       [address] [place] [address] Information: Should you experience any significant changes in your wound(s) or have questions about your wound care, please contact the [address] [place] [address] at [address] [place] [address] 8:00 am - 4:30. If you need help with your wound outside these hours and cannot wait until we are again available, contact your PCP or go to the hospital emergency room. PLEASE NOTE: IF YOU ARE UNABLE TO OBTAIN WOUND SUPPLIES, CONTINUE TO USE THE SUPPLIES YOU HAVE AVAILABLE UNTIL YOU ARE ABLE TO REACH US. IT IS MOST IMPORTANT TO KEEP THE WOUND COVERED AT ALL TIMES.

## 2021-08-06 NOTE — WOUND CARE
08/03/21 1458   Wound Foot Plantar   Date First Assessed/Time First Assessed: 08/03/21 1457   Present on Hospital Admission: Yes  Primary Wound Type: Diabetic Ulcer  Location: Foot  Wound Location Orientation: Plantar   Wound Image     Wound Etiology Diabetic   Dressing Status Breakthrough drainage noted   Cleansed Cleansed with saline; Wound cleanser   Dressing/Treatment Collagen with Ag;Alginate   Offloading for Diabetic Foot Ulcers Felt or foam   Dressing Change Due 08/10/21   Wound Length (cm) 4 cm   Wound Width (cm) 9 cm   Wound Depth (cm) 0.3 cm   Wound Surface Area (cm^2) 36 cm^2   Wound Volume (cm^3) 10.8 cm^3   Post-Procedure Length (cm) 9.5 cm   Post-Procedure Width (cm) 0.6 cm   Post-Procedure Surface Area (cm^2) 5.7 cm^2   Wound Assessment Slough;Granulation tissue   Drainage Amount Moderate   Drainage Description Serosanguinous   Wound Odor None   Tamika-Wound/Incision Assessment Hyperkeratosis (Callous)   Edges Defined edges   Wound Thickness Description Full thickness     Unna Boot Application   Below Knee    NAME:  Fitz Lema  YOB: 1960  MEDICAL RECORD NUMBER:  [id number]  DATE:  8/3/2021    Remove old Unna Boot or Boots. Applied moisturizing agent to dry skin as needed. Appied primary and secondary dressing as ordered. Applied Unna roll from toes to knee overlapping each time. Applied ace wrap or coban from toes to below the knee. Secured with tape and/or metal clips covered with tape. Instructed patient/caregiver to keep dressing dry and intact. DO NOT REMOVE DRESSING. Instructed pt/family/caregiver to report excessive draining, loose bandage, wet dressing, severe pain or tingling in toes. Applied [employer] dressing below the knee to left lower leg. Unna Boot(s) were applied per  Guidelines.     Response to treatment: Well tolerated by patient      Electronically signed by Gela Kennedy RN on 8/6/2021 at 3:34 PM

## 2021-08-10 ENCOUNTER — HOSPITAL ENCOUNTER (OUTPATIENT)
Dept: WOUND CARE | Age: 61
Discharge: HOME | End: 2021-08-10
Attending: PODIATRIST
Payer: COMMERCIAL

## 2021-08-10 PROCEDURE — 11043 DBRDMT MUSC&/FSCA 1ST 20/<: CPT

## 2021-08-10 PROCEDURE — 11042 DBRDMT SUBQ TIS 1ST 20SQCM/<: CPT

## 2021-08-10 NOTE — WOUND CARE
Debridement Wound Care        Problem List Items Addressed This Visit     None          Procedure Note  Indications:  Based on my examination of this patient's wound(s)/ulcer(s) today, debridement is required to promote healing and evaluate the wound base. Performed by: Evelyn Combs DPM    Consent obtained: Yes    Time out taken: Yes    Debridement: Excisional    Using curette, tissue nippers, # 10 blade scalpel and # 15 blade scalpel the wound(s)/ulcer(s) was/were sharply debrided down through and including the removal of    epidermis, dermis, subcutaneous tissue and muscle/fascia    Devitalized Tissue Debrided: fibrin, biofilm, slough, exudate and callus    Pre Debridement Measurements:  Are located in the Wound/Ulcer Documentation Flow Sheet    Wound/Ulcer #: 1    Post Debridement Measurements:  Wound/Ulcer Descriptions are Pre Debridement except measurements:Diabetic ulcer, muscle necrosis    Wound Foot Plantar (Active)   Wound Image    08/03/21 1458   Wound Etiology Diabetic 08/03/21 1458   Dressing Status Breakthrough drainage noted 08/03/21 1458   Cleansed Cleansed with saline; Wound cleanser 08/03/21 1458   Dressing/Treatment Collagen with Ag;Alginate 08/03/21 1458   Offloading for Diabetic Foot Ulcers Felt or foam 08/03/21 1458   Dressing Change Due 08/10/21 08/03/21 1458   Wound Length (cm) 4 cm 08/03/21 1458   Wound Width (cm) 9 cm 08/03/21 1458   Wound Depth (cm) 0.3 cm 08/03/21 1458   Wound Surface Area (cm^2) 36 cm^2 08/03/21 1458   Wound Volume (cm^3) 10.8 cm^3 08/03/21 1458   Post-Procedure Length (cm) 9.5 cm 08/03/21 1458   Post-Procedure Width (cm) 0.6 cm 08/03/21 1458   Post-Procedure Surface Area (cm^2) 5.7 cm^2 08/03/21 1458   Wound Assessment Slough;Granulation tissue 08/03/21 1458   Drainage Amount Moderate 08/03/21 1458   Drainage Description Serosanguinous 08/03/21 1458   Wound Odor None 08/03/21 1458   Tamika-Wound/Incision Assessment Hyperkeratosis (Callous) 08/03/21 1458   Edges Defined edges 08/03/21 1458   Wound Thickness Description Full thickness 08/03/21 1458   Number of days: 7       Incision 06/26/21 Foot Left (Active)   Number of days: 45        Percent of Wound(s)/Ulcer(s) Debrided: 100%    Total Surface Area Debrided:  approx 12 sq cm see RN's note    Diabetic/Pressure/Non Pressure Ulcers only:  Ulcer:     Estimated Blood Loss:  Estimated amt of blood loss is 10 ml    Hemostasis Achieved: Pressure    Procedural Pain: 0 / 10     Post Procedural Pain: 0 / 10     Response to treatment: Well tolerated by patient    Will continue TCC treatment

## 2021-08-17 ENCOUNTER — HOSPITAL ENCOUNTER (OUTPATIENT)
Dept: WOUND CARE | Age: 61
Discharge: HOME | End: 2021-08-17
Attending: PODIATRIST
Payer: COMMERCIAL

## 2021-08-17 VITALS
RESPIRATION RATE: 18 BRPM | SYSTOLIC BLOOD PRESSURE: 151 MMHG | TEMPERATURE: 98.5 F | OXYGEN SATURATION: 97 % | HEART RATE: 79 BPM | DIASTOLIC BLOOD PRESSURE: 65 MMHG

## 2021-08-17 PROCEDURE — 11043 DBRDMT MUSC&/FSCA 1ST 20/<: CPT

## 2021-08-17 PROCEDURE — 11042 DBRDMT SUBQ TIS 1ST 20SQCM/<: CPT

## 2021-08-19 NOTE — WOUND CARE
Debridement Wound Care        Problem List Items Addressed This Visit     None          Procedure Note  Indications:  Based on my examination of this patient's wound(s)/ulcer(s) today, debridement is required to promote healing and evaluate the wound base.     Performed by: Mulugeta Dexter DPM    Consent obtained: Yes    Time out taken: Yes    Debridement: Excisional    Using curette, tissue nippers and # 10 blade scalpel the wound(s)/ulcer(s) was/were sharply debrided down through and including the removal of    epidermis, dermis, subcutaneous tissue and muscle/fascia    Devitalized Tissue Debrided: fibrin, biofilm, slough, exudate and callus    Pre Debridement Measurements:  Are located in the [place]  Documentation Flow Sheet    Wound/Ulcer #: 1    Post Debridement Measurements:  Wound/Ulcer Descriptions are Pre Debridement except measurements:Diabetic ulcer, muscle necrosis    Wound Foot Plantar (Active)   Wound Image    08/17/21 1440   Wound Etiology Diabetic 08/17/21 1440   Dressing Status Breakthrough drainage noted 08/03/21 1458   Cleansed Cleansed with saline 08/17/21 1440   Dressing/Treatment Collagen with Ag;Alginate 08/03/21 1458   Offloading for Diabetic Foot Ulcers Felt or foam 08/03/21 1458   Dressing Change Due 08/10/21 08/03/21 1458   Wound Length (cm) 1.5 cm 08/17/21 1440   Wound Width (cm) 8 cm 08/17/21 1440   Wound Depth (cm) 0.3 cm 08/17/21 1440   Wound Surface Area (cm^2) 12 cm^2 08/17/21 1440   Change in Wound Size % 66.67 08/17/21 1440   Wound Volume (cm^3) 3.6 cm^3 08/17/21 1440   Wound Healing % 67 08/17/21 1440   Post-Procedure Length (cm) 9.5 cm 08/03/21 1458   Post-Procedure Width (cm) 0.6 cm 08/03/21 1458   Post-Procedure Surface Area (cm^2) 5.7 cm^2 08/03/21 1458   Wound Assessment Slough;Granulation tissue 08/03/21 1458   Drainage Amount Moderate 08/03/21 1458   Drainage Description Serosanguinous 08/03/21 1458   Wound Odor None 08/03/21 1458   Tamika-Wound/Incision Assessment Hyperkeratosis (Callous) 08/03/21 1458   Edges Defined edges 08/03/21 1458   Wound Thickness Description Full thickness 08/03/21 1458   Number of days: 16       Incision 06/26/21 Foot Left (Active)   Number of days: 54        Percent of Wound(s)/Ulcer(s) Debrided: 100%    Total Surface Area Debrided:  approx 12 sq cm see RN's note    Diabetic/Pressure/Non Pressure Ulcers only:  Ulcer:     Estimated Blood Loss:  Minimal     Hemostasis Achieved: Pressure    Procedural Pain: 0 / 10     Post Procedural Pain: 0 / 10     Response to treatment: Well tolerated by patient

## 2021-08-23 VITALS
TEMPERATURE: 98.5 F | HEART RATE: 77 BPM | DIASTOLIC BLOOD PRESSURE: 66 MMHG | OXYGEN SATURATION: 98 % | RESPIRATION RATE: 18 BRPM | SYSTOLIC BLOOD PRESSURE: 149 MMHG

## 2021-08-23 NOTE — WOUND CARE
08/10/21 1146   Wound Foot Plantar   Date First Assessed/Time First Assessed: 08/03/21 1457   Present on Hospital Admission: Yes  Primary Wound Type: Diabetic Ulcer  Location: Foot  Wound Location Orientation: Plantar   Wound Image     Wound Etiology Diabetic   Dressing Status Breakthrough drainage noted   Cleansed Wound cleanser   Dressing/Treatment Collagen   Offloading for Diabetic Foot Ulcers Total contact cast   Dressing Change Due 08/17/21   Wound Length (cm) 2 cm   Wound Width (cm) 8.5 cm   Wound Depth (cm) 0.3 cm   Wound Surface Area (cm^2) 17 cm^2   Change in Wound Size % 52.78   Wound Volume (cm^3) 5.1 cm^3   Wound Healing % 53   Post-Procedure Length (cm) 2.3 cm   Post-Procedure Width (cm) 8.6 cm   Post-Procedure Depth (cm) 0.3 cm   Post-Procedure Surface Area (cm^2) 19.78 cm^2   Post-Procedure Volume (cm^3) 5.934 cm^3   Wound Assessment Slough;Pale granulation tissue   Drainage Amount Moderate   Drainage Description Serosanguinous   Wound Odor Mild   Tamika-Wound/Incision Assessment Hyperkeratosis (Callous)   Edges Defined edges   Wound Thickness Description Full thickness     Multilayer Compression Wrap   (Not Unna) Below the Knee    NAME:  Alejandro Jones  YOB: 1960  MEDICAL RECORD NUMBER:  [id number]  DATE:  8/10/2021    Removed old Multilayer wrap if indicated and wash leg with mild soap/water. Applied moisturizing agent to dry skin as needed. Applied primary and secondary dressing as ordered. Applied multilayered dressing below the knee to left lower leg. Instructed patient/caregiver not to remove dressing and to keep it clean and dry. Instructed patient/caregiver on complications to report to provider, such as pain, numbness in toes, heavy drainage, and slippage of dressing. Instructed patient on purpose of compression dressing and on activity and exercise recommendations.     Response to treatment: Well tolerated by patient       Electronically signed by Marcelo Hooker RN on 8/23/2021 at 11:49 AM    Total Contact Cast    NAME:  Rosemary Cash  YOB: 1960  MEDICAL RECORD NUMBER:  [id number]  DATE:  8/10/2021    Goal:  Patient will maintain integrity of cast, avoid mobility hazards, and report complications that may occur (foul odor, pain, numbness, cracked cast). Removed old contact cast if indicated and wash extremity with soap and water. Applied ordered dressing. Applied per MD  Applied in clinic to left lower leg. Allow cast to dry. Instructed patient to report to health care provider, including wound care center, any back pain, hip pain, or leg pain, numbness of toes, or any odor coming from the cast.   Instructed patient not to stick any foreign objects down into cast.  Instructed patient to utilize assistive devices(crutches, cane or walker) as ordered. Instructed patient to continue offloading as directed.      Applied cast per  Guidelines  Response to treatment: Well tolerated by patient     Electronically signed by Sherley Cottrell RN on 8/23/2021 at 11:49 AM

## 2021-08-23 NOTE — WOUND CARE
08/17/21 1440   Wound Foot Plantar   Date First Assessed/Time First Assessed: 08/03/21 1457   Present on Hospital Admission: Yes  Primary Wound Type: Diabetic Ulcer  Location: Foot  Wound Location Orientation: Plantar   Wound Image     Wound Etiology Diabetic   Dressing Status Breakthrough drainage noted   Cleansed Cleansed with saline   Dressing/Treatment Collagen;Cast padding   Offloading for Diabetic Foot Ulcers Total contact cast   Dressing Change Due 08/24/21   Wound Length (cm) 1.5 cm   Wound Width (cm) 8 cm   Wound Depth (cm) 0.3 cm   Wound Surface Area (cm^2) 12 cm^2   Change in Wound Size % 66.67   Wound Volume (cm^3) 3.6 cm^3   Wound Healing % 67   Post-Procedure Length (cm) 2 cm   Post-Procedure Width (cm) 8.5 cm   Post-Procedure Depth (cm) 0.3 cm   Post-Procedure Surface Area (cm^2) 17 cm^2   Post-Procedure Volume (cm^3) 5.1 cm^3   Wound Assessment Granulation tissue   Drainage Amount Moderate   Drainage Description Serosanguinous   Wound Odor Mild   Tamika-Wound/Incision Assessment Hyperkeratosis (Callous)   Edges Defined edges   Wound Thickness Description Full thickness     Total Contact Cast    NAME:  Valencia Pierre  YOB: 1960  MEDICAL RECORD NUMBER:  [id number]  DATE:  8/17/2021    Goal:  Patient will maintain integrity of cast, avoid mobility hazards, and report complications that may occur (foul odor, pain, numbness, cracked cast). Removed old contact cast if indicated and wash extremity with soap and water. Applied ordered dressing. Applied per MD  Applied in clinic to left lower leg. Allow cast to dry. Instructed patient to report to health care provider, including wound care center, any back pain, hip pain, or leg pain, numbness of toes, or any odor coming from the cast.   Instructed patient not to stick any foreign objects down into cast.  Instructed patient to utilize assistive devices(crutches, cane or walker) as ordered.   Instructed patient to continue offloading as directed.      Applied cast per  Guidelines  Response to treatment: Well tolerated by patient     Electronically signed by Lary Hendrix RN on 8/23/2021 at 3:16 PM

## 2021-08-24 ENCOUNTER — APPOINTMENT (OUTPATIENT)
Dept: URBAN - NONMETROPOLITAN AREA CLINIC 45 | Age: 61
Setting detail: DERMATOLOGY
End: 2021-08-24

## 2021-08-24 ENCOUNTER — HOSPITAL ENCOUNTER (OUTPATIENT)
Dept: WOUND CARE | Age: 61
Discharge: HOME | End: 2021-08-24
Attending: PODIATRIST
Payer: COMMERCIAL

## 2021-08-24 DIAGNOSIS — Z80.8 FAMILY HISTORY OF MALIGNANT NEOPLASM OF OTHER ORGANS OR SYSTEMS: ICD-10-CM

## 2021-08-24 DIAGNOSIS — L82.1 OTHER SEBORRHEIC KERATOSIS: ICD-10-CM

## 2021-08-24 DIAGNOSIS — L81.4 OTHER MELANIN HYPERPIGMENTATION: ICD-10-CM

## 2021-08-24 DIAGNOSIS — B07.8 OTHER VIRAL WARTS: ICD-10-CM

## 2021-08-24 PROBLEM — I10 ESSENTIAL (PRIMARY) HYPERTENSION: Status: ACTIVE | Noted: 2021-08-24

## 2021-08-24 PROBLEM — L23.7 ALLERGIC CONTACT DERMATITIS DUE TO PLANTS, EXCEPT FOOD: Status: ACTIVE | Noted: 2021-08-24

## 2021-08-24 PROBLEM — J30.1 ALLERGIC RHINITIS DUE TO POLLEN: Status: ACTIVE | Noted: 2021-08-24

## 2021-08-24 PROBLEM — L85.3 XEROSIS CUTIS: Status: ACTIVE | Noted: 2021-08-24

## 2021-08-24 PROCEDURE — OTHER TREATMENT REGIMEN: OTHER

## 2021-08-24 PROCEDURE — OTHER MIPS QUALITY: OTHER

## 2021-08-24 PROCEDURE — OTHER LIQUID NITROGEN: OTHER

## 2021-08-24 PROCEDURE — OTHER COUNSELING: OTHER

## 2021-08-24 PROCEDURE — 99203 OFFICE O/P NEW LOW 30 MIN: CPT | Mod: 25

## 2021-08-24 PROCEDURE — 11043 DBRDMT MUSC&/FSCA 1ST 20/<: CPT

## 2021-08-24 PROCEDURE — 11042 DBRDMT SUBQ TIS 1ST 20SQCM/<: CPT

## 2021-08-24 PROCEDURE — 17111 DESTRUCT LESION 15 OR MORE: CPT

## 2021-08-24 ASSESSMENT — LOCATION ZONE DERM
LOCATION ZONE: TRUNK
LOCATION ZONE: LEG
LOCATION ZONE: NOSE
LOCATION ZONE: NECK
LOCATION ZONE: ARM
LOCATION ZONE: FINGERNAIL
LOCATION ZONE: FINGER
LOCATION ZONE: FACE

## 2021-08-24 ASSESSMENT — LOCATION SIMPLE DESCRIPTION DERM
LOCATION SIMPLE: LEFT INDEX FINGER
LOCATION SIMPLE: RIGHT INDEX FINGER
LOCATION SIMPLE: UPPER BACK
LOCATION SIMPLE: NOSE
LOCATION SIMPLE: RIGHT MIDDLE FINGER
LOCATION SIMPLE: POSTERIOR NECK
LOCATION SIMPLE: LEFT FOREARM
LOCATION SIMPLE: LEFT KNEE
LOCATION SIMPLE: RIGHT FOREHEAD
LOCATION SIMPLE: CHEST
LOCATION SIMPLE: RIGHT THIGH

## 2021-08-24 ASSESSMENT — LOCATION DETAILED DESCRIPTION DERM
LOCATION DETAILED: INFERIOR THORACIC SPINE
LOCATION DETAILED: NASAL ROOT
LOCATION DETAILED: RIGHT FOREHEAD
LOCATION DETAILED: LEFT DISTAL DORSAL FOREARM
LOCATION DETAILED: RIGHT MIDDLE DISTAL INTERPHALANGEAL JOINT
LOCATION DETAILED: LEFT INDEX FINGERNAIL
LOCATION DETAILED: LEFT KNEE
LOCATION DETAILED: RIGHT ANTERIOR DISTAL THIGH
LOCATION DETAILED: LEFT PROXIMAL DORSAL FOREARM
LOCATION DETAILED: RIGHT ANTERIOR LATERAL DISTAL THIGH
LOCATION DETAILED: RIGHT SUPERIOR POSTERIOR NECK
LOCATION DETAILED: RIGHT MIDDLE FINGERNAIL
LOCATION DETAILED: LEFT LATERAL SUPERIOR CHEST
LOCATION DETAILED: RIGHT INDEX FINGERNAIL

## 2021-08-24 NOTE — PROCEDURE: LIQUID NITROGEN
Consent: The patient's consent was obtained including but not limited to risks of crusting, scabbing, blistering, scarring, darker or lighter pigmentary change, recurrence, incomplete removal and infection.
Show Aperture Variable?: Yes
Add 52 Modifier (Optional): no
Number Of Freeze-Thaw Cycles: 2 freeze-thaw cycles
Medical Necessity Clause: This procedure was medically necessary because the lesions that were treated were:
Medical Necessity Information: It is in your best interest to select a reason for this procedure from the list below. All of these items fulfill various CMS LCD requirements except the new and changing color options.
Detail Level: Detailed
Post-Care Instructions: I reviewed with the patient in detail post-care instructions. Patient is to wear sunprotection, and avoid picking at any of the treated lesions. Pt may apply Vaseline to crusted or scabbing areas.

## 2021-08-24 NOTE — WOUND CARE
Debridement Wound Care        Problem List Items Addressed This Visit     None          Procedure Note  Indications:  Based on my examination of this patient's wound(s)/ulcer(s) today, debridement is required to promote healing and evaluate the wound base.     Performed by: Mady Valentino DPM    Consent obtained: Yes    Time out taken: Yes    Debridement: Excisional    Using curette and # 15 blade scalpel the wound(s)/ulcer(s) was/were sharply debrided down through and including the removal of    epidermis, dermis, subcutaneous tissue and muscle/fascia    Devitalized Tissue Debrided: fibrin, biofilm, slough, exudate and callus    Pre Debridement Measurements:  Are located in the [place]  Documentation Flow Sheet    Wound/Ulcer #: 1    Post Debridement Measurements:  Wound/Ulcer Descriptions are Pre Debridement except measurements:Diabetic ulcer, muscle necrosis    Wound Foot Plantar (Active)   Wound Image   08/24/21 1714   Wound Etiology Diabetic 08/17/21 1440   Dressing Status Breakthrough drainage noted 08/17/21 1440   Cleansed Cleansed with saline 08/17/21 1440   Dressing/Treatment Collagen;Cast padding 08/17/21 1440   Offloading for Diabetic Foot Ulcers Total contact cast 08/17/21 1440   Dressing Change Due 08/24/21 08/17/21 1440   Wound Length (cm) 1.5 cm 08/17/21 1440   Wound Width (cm) 8 cm 08/17/21 1440   Wound Depth (cm) 0.3 cm 08/17/21 1440   Wound Surface Area (cm^2) 12 cm^2 08/17/21 1440   Change in Wound Size % 66.67 08/17/21 1440   Wound Volume (cm^3) 3.6 cm^3 08/17/21 1440   Wound Healing % 67 08/17/21 1440   Post-Procedure Length (cm) 2 cm 08/17/21 1440   Post-Procedure Width (cm) 8.5 cm 08/17/21 1440   Post-Procedure Depth (cm) 0.3 cm 08/17/21 1440   Post-Procedure Surface Area (cm^2) 17 cm^2 08/17/21 1440   Post-Procedure Volume (cm^3) 5.1 cm^3 08/17/21 1440   Wound Assessment Granulation tissue 08/17/21 1440   Drainage Amount Moderate 08/17/21 1440   Drainage Description Serosanguinous 08/17/21 1440   Wound Odor Mild 08/17/21 1440   Tamika-Wound/Incision Assessment Hyperkeratosis (Callous) 08/17/21 1440   Edges Defined edges 08/17/21 1440   Wound Thickness Description Full thickness 08/17/21 1440   Number of days: 21       Incision 06/26/21 Foot Left (Active)   Number of days: 59        Percent of Wound(s)/Ulcer(s) Debrided: 100%    Total Surface Area Debrided:  approx 10 sq cm see RN's note    Diabetic/Pressure/Non Pressure Ulcers only:  Ulcer:     Estimated Blood Loss:  Estimated amt of blood loss is 10 ml    Hemostasis Achieved: Pressure    Procedural Pain: 0 / 10     Post Procedural Pain: 0 / 10     Response to treatment: Well tolerated by patient

## 2021-08-24 NOTE — PROCEDURE: TREATMENT REGIMEN
Detail Level: Simple
Plan: Wartstick  40% salicylic acid apply at night and cover with tape to any residual\\nIf fails wartstick consider compounded cream with salicylic acid and urea\\nRecommend zinc sulfate supplement 220 mg a day x1 month, if tolerates then increase to twice a day

## 2021-08-24 NOTE — HPI: SKIN LESIONS
Is This A New Presentation, Or A Follow-Up?: Skin Lesions
Additional History: Patient has a history of warts

## 2021-08-25 VITALS
SYSTOLIC BLOOD PRESSURE: 160 MMHG | TEMPERATURE: 97.9 F | OXYGEN SATURATION: 99 % | DIASTOLIC BLOOD PRESSURE: 78 MMHG | RESPIRATION RATE: 18 BRPM | HEART RATE: 84 BPM

## 2021-08-25 NOTE — WOUND CARE
08/24/21 1714   Wound Foot Plantar   Date First Assessed/Time First Assessed: 08/03/21 1457   Present on Hospital Admission: Yes  Primary Wound Type: Diabetic Ulcer  Location: Foot  Wound Location Orientation: Plantar   Wound Image    Wound Etiology Diabetic   Dressing Status Breakthrough drainage noted   Cleansed Wound cleanser;Irrigated with saline   Dressing/Treatment Collagen;Alginate with Ag;Gauze dressing/dressing sponge;ABD pad;Roll gauze; Cast  (2 layer wrap under cast)   Offloading for Diabetic Foot Ulcers Total contact cast;Post-op shoe   Dressing Change Due 08/31/21   Wound Length (cm) 1.4 cm   Wound Width (cm) 7.9 cm   Wound Depth (cm) 0.2 cm   Wound Surface Area (cm^2) 11.06 cm^2   Change in Wound Size % 69.28   Wound Volume (cm^3) 2.212 cm^3   Wound Healing % 80   Post-Procedure Length (cm) 1.5 cm   Post-Procedure Width (cm) 8 cm   Post-Procedure Depth (cm) 0.3 cm   Post-Procedure Surface Area (cm^2) 12 cm^2   Post-Procedure Volume (cm^3) 3.6 cm^3   Wound Assessment Pink/red;Pale granulation tissue   Drainage Amount Moderate   Drainage Description Serosanguinous   Wound Odor Mild   Tamika-Wound/Incision Assessment Hyperkeratosis (Callous)   Edges Defined edges   Wound Thickness Description Full thickness

## 2021-08-25 NOTE — WOUND CARE
Total Contact Cast    NAME:  Rona Hylton  YOB: 1960  MEDICAL RECORD NUMBER:  [id number]  DATE:  8/24/2021    Goal:  Patient will maintain integrity of cast, avoid mobility hazards, and report complications that may occur (foul odor, pain, numbness, cracked cast). Removed old contact cast if indicated and wash extremity with soap and water. Applied ordered dressing. Applied per Dr order  Applied in clinic to left lower leg. Allow cast to dry. Instructed patient to report to health care provider, including wound care center, any back pain, hip pain, or leg pain, numbness of toes, or any odor coming from the cast.   Instructed patient not to stick any foreign objects down into cast.  Instructed patient to utilize assistive devices(crutches, cane or walker) as ordered. Instructed patient to continue offloading as directed.      Applied cast per  Guidelines  Response to treatment: Well tolerated by patient     Electronically signed by Minna Saucedo RN on 8/25/2021 at 9:19 AM

## 2021-08-31 ENCOUNTER — HOSPITAL ENCOUNTER (OUTPATIENT)
Dept: WOUND CARE | Age: 61
Discharge: HOME | End: 2021-08-31
Attending: PODIATRIST
Payer: COMMERCIAL

## 2021-08-31 DIAGNOSIS — E11.42 DIABETIC POLYNEUROPATHY ASSOCIATED WITH TYPE 2 DIABETES MELLITUS (HCC): Primary | ICD-10-CM

## 2021-08-31 PROCEDURE — 11042 DBRDMT SUBQ TIS 1ST 20SQCM/<: CPT

## 2021-08-31 PROCEDURE — 11043 DBRDMT MUSC&/FSCA 1ST 20/<: CPT

## 2021-08-31 RX ORDER — GABAPENTIN 100 MG/1
100 CAPSULE ORAL 3 TIMES DAILY
Qty: 90 CAPSULE | Refills: 0 | Status: SHIPPED | OUTPATIENT
Start: 2021-08-31 | End: 2021-09-30

## 2021-09-01 VITALS
RESPIRATION RATE: 18 BRPM | DIASTOLIC BLOOD PRESSURE: 85 MMHG | OXYGEN SATURATION: 100 % | TEMPERATURE: 98.6 F | SYSTOLIC BLOOD PRESSURE: 155 MMHG | HEART RATE: 79 BPM

## 2021-09-01 NOTE — WOUND CARE
08/31/21 4531   Wound Foot Plantar   Date First Assessed/Time First Assessed: 08/03/21 1457   Present on Hospital Admission: Yes  Primary Wound Type: Diabetic Ulcer  Location: Foot  Wound Location Orientation: Plantar   Wound Image     Wound Etiology Diabetic   Dressing Status Breakthrough drainage noted   Cleansed Wound cleanser;Irrigated with saline   Dressing/Treatment Collagen;Alginate with Ag;Gauze dressing/dressing sponge;Roll gauze; Cast  (2 layer wrap)   Offloading for Diabetic Foot Ulcers Total contact cast;Post-op shoe   Dressing Change Due 09/07/21   Wound Length (cm) 1.4 cm   Wound Width (cm) 7.5 cm   Wound Depth (cm) 0.2 cm   Wound Surface Area (cm^2) 10.5 cm^2   Change in Wound Size % 70.83   Wound Volume (cm^3) 2.1 cm^3   Wound Healing % 81   Post-Procedure Length (cm) 1.5 cm   Post-Procedure Width (cm) 7.6 cm   Post-Procedure Depth (cm) 0.3 cm   Post-Procedure Surface Area (cm^2) 11.4 cm^2   Post-Procedure Volume (cm^3) 3.42 cm^3   Wound Assessment Pink/red;Pale granulation tissue   Drainage Amount Moderate   Drainage Description Serosanguinous   Wound Odor Mild   Tamika-Wound/Incision Assessment Hyperkeratosis (Callous)   Edges Defined edges   Wound Thickness Description Full thickness

## 2021-09-07 ENCOUNTER — HOSPITAL ENCOUNTER (OUTPATIENT)
Dept: WOUND CARE | Age: 61
Discharge: HOME | End: 2021-09-07
Attending: PODIATRIST
Payer: COMMERCIAL

## 2021-09-07 PROCEDURE — 11042 DBRDMT SUBQ TIS 1ST 20SQCM/<: CPT

## 2021-09-07 NOTE — WOUND CARE
Debridement Wound Care        Problem List Items Addressed This Visit     None      Has a new wound on the right foot the left foot cast hit the right foot in the bed    Procedure Note  Indications:  Based on my examination of this patient's wound(s)/ulcer(s) today, debridement is required to promote healing and evaluate the wound base.     Performed by: Darwyn Romberg, DPM    Consent obtained: Yes    Time out taken: Yes    Debridement: Excisional    Using curette and tissue nippers the wound(s)/ulcer(s) was/were sharply debrided down through and including the removal of    epidermis, dermis, subcutaneous tissue and muscle/fascia    Devitalized Tissue Debrided: fibrin, biofilm, slough, necrotic/eschar and exudate    Pre Debridement Measurements:  Are located in the Wound/Ulcer Documentation Flow Sheet    Wound/Ulcer #: 2    Post Debridement Measurements:  Wound/Ulcer Descriptions are Pre Debridement except measurements:Diabetic ulcer, muscle necrosis    Wound Foot Plantar (Active)   Number of days: 35       Incision 06/26/21 Foot Left (Active)   Number of days: 73        Percent of Wound(s)/Ulcer(s) Debrided: 100%    Total Surface Area Debrided:  approx  12 sq cm see RN's note    Diabetic/Pressure/Non Pressure Ulcers only:  Ulcer:     Estimated Blood Loss:  Estimated amt of blood loss is 10 ml    Hemostasis Achieved: Pressure    Procedural Pain: 0 / 10     Post Procedural Pain: 0 / 10     Response to treatment: Well tolerated by patient

## 2021-09-08 NOTE — WOUND CARE
Debridement Wound Care        Problem List Items Addressed This Visit     None      Visit Diagnoses     Diabetic polyneuropathy associated with type 2 diabetes mellitus ([facility] [address]    -  Primary    Relevant Medications    gabapentin (NEURONTIN) 100 mg capsule          Procedure Note  Indications:  Based on my examination of this patient's wound(s)/ulcer(s) today, debridement is required to promote healing and evaluate the wound base. Performed by: Mulugeta Dexter DPM    Consent obtained: Yes    Time out taken: Yes    Debridement: Excisional    Using curette and # 10 blade scalpel the wound(s)/ulcer(s) was/were sharply debrided down through and including the removal of    epidermis, dermis, subcutaneous tissue and muscle/fascia    Devitalized Tissue Debrided: fibrin, biofilm, slough and callus    Pre Debridement Measurements:  Are located in the [facility]  Documentation Flow Sheet    Wound/Ulcer #: 1    Post Debridement Measurements:  Wound/Ulcer Descriptions are Pre Debridement except measurements:Diabetic ulcer, muscle necrosis    Wound Foot Plantar (Active)   Wound Image    08/31/21 0938   Wound Etiology Diabetic 08/31/21 0938   Dressing Status Breakthrough drainage noted 08/31/21 0938   Cleansed Wound cleanser;Irrigated with saline 08/31/21 0338   Dressing/Treatment Collagen;Alginate with Ag;Gauze dressing/dressing sponge;Roll gauze; Cast 08/31/21 0938   Offloading for Diabetic Foot Ulcers Total contact cast;Post-op shoe 08/31/21 0938   Dressing Change Due 09/07/21 08/31/21 0938   Wound Length (cm) 1.4 cm 08/31/21 0938   Wound Width (cm) 7.5 cm 08/31/21 0938   Wound Depth (cm) 0.2 cm 08/31/21 0938   Wound Surface Area (cm^2) 10.5 cm^2 08/31/21 0938   Change in Wound Size % 70.83 08/31/21 0938   Wound Volume (cm^3) 2.1 cm^3 08/31/21 0938   Wound Healing % 81 08/31/21 0938   Post-Procedure Length (cm) 1.5 cm 08/31/21 0938   Post-Procedure Width (cm) 7.6 cm 08/31/21 0938   Post-Procedure Depth (cm) 0.3 cm 08/31/21 3463   Post-Procedure Surface Area (cm^2) 11.4 cm^2 08/31/21 0938   Post-Procedure Volume (cm^3) 3.42 cm^3 08/31/21 0938   Wound Assessment Pink/red;Pale granulation tissue 08/31/21 0938   Drainage Amount Moderate 08/31/21 0938   Drainage Description Serosanguinous 08/31/21 0938   Wound Odor Mild 08/31/21 0938   Tamika-Wound/Incision Assessment Hyperkeratosis (Callous) 08/31/21 0938   Edges Defined edges 08/31/21 0938   Wound Thickness Description Full thickness 08/31/21 0938   Number of days: 36       Incision 06/26/21 Foot Left (Active)   Number of days: 74        Percent of Wound(s)/Ulcer(s) Debrided: 100%    Total Surface Area Debrided:  approx 10 sq cm see RN's note    Diabetic/Pressure/Non Pressure Ulcers only:  Ulcer:     Estimated Blood Loss:  Estimated amt of blood loss is 10 ml    Hemostasis Achieved: Pressure    Procedural Pain: 0 / 10     Post Procedural Pain: 0 / 10     Response to treatment: Well tolerated by patient

## 2021-09-14 ENCOUNTER — HOSPITAL ENCOUNTER (OUTPATIENT)
Dept: WOUND CARE | Age: 61
Discharge: HOME | End: 2021-09-14
Attending: PODIATRIST
Payer: COMMERCIAL

## 2021-09-14 PROCEDURE — 29445 APPL RIGID TOT CNTC LEG CAST: CPT

## 2021-09-14 PROCEDURE — 11042 DBRDMT SUBQ TIS 1ST 20SQCM/<: CPT

## 2021-09-14 PROCEDURE — 11043 DBRDMT MUSC&/FSCA 1ST 20/<: CPT

## 2021-09-14 NOTE — WOUND CARE
Debridement Wound Care        Problem List Items Addressed This Visit     None          Procedure Note  Indications:  Based on my examination of this patient's wound(s)/ulcer(s) today, debridement is required to promote healing and evaluate the wound base.     Performed by: Abdulkadir Soliman DPM    Consent obtained: Yes    Time out taken: Yes    Debridement: Excisional    Using curette, tissue nippers and # 10 blade scalpel the wound(s)/ulcer(s) was/were sharply debrided down through and including the removal of    epidermis, dermis, subcutaneous tissue and muscle/fascia    Devitalized Tissue Debrided: fibrin, biofilm, slough, exudate and callus    Pre Debridement Measurements:  Are located in the Wound/Ulcer Documentation Flow Sheet    Wound/Ulcer #: 2    Post Debridement Measurements:  Wound/Ulcer Descriptions are Pre Debridement except measurements:Diabetic ulcer, muscle necrosis    Wound Foot Plantar (Active)   Number of days: 42       Incision 06/26/21 Foot Left (Active)   Number of days: 80        Percent of Wound(s)/Ulcer(s) Debrided: 100%    Total Surface Area Debrided:  approx 8 sq cm see RN's note    Diabetic/Pressure/Non Pressure Ulcers only:  Ulcer:     Estimated Blood Loss:  Estimated amt of blood loss is 10 ml    Hemostasis Achieved: Pressure    Procedural Pain: 0 / 10     Post Procedural Pain: 0 / 10     Response to treatment: Well tolerated by patient

## 2021-09-16 VITALS
HEART RATE: 78 BPM | SYSTOLIC BLOOD PRESSURE: 148 MMHG | OXYGEN SATURATION: 99 % | DIASTOLIC BLOOD PRESSURE: 81 MMHG | TEMPERATURE: 98.6 F | RESPIRATION RATE: 18 BRPM

## 2021-09-16 NOTE — WOUND CARE
09/07/21 1555   Wound Foot Plantar   Date First Assessed/Time First Assessed: 08/03/21 1457   Present on Hospital Admission: Yes  Primary Wound Type: Diabetic Ulcer  Location: Foot  Wound Location Orientation: Plantar   Wound Etiology Diabetic   Dressing Status Breakthrough drainage noted   Cleansed Cleansed with saline   Dressing/Treatment Collagen   Offloading for Diabetic Foot Ulcers Total contact cast   Dressing Change Due 09/07/21   Wound Length (cm) 1 cm   Wound Width (cm) 7 cm   Wound Depth (cm) 0.2 cm   Wound Surface Area (cm^2) 7 cm^2   Change in Wound Size % 80.56   Wound Volume (cm^3) 1.4 cm^3   Wound Healing % 87   Post-Procedure Length (cm) 1.5 cm   Post-Procedure Width (cm) 7.6 cm   Post-Procedure Depth (cm) 0.3 cm   Post-Procedure Surface Area (cm^2) 11.4 cm^2   Post-Procedure Volume (cm^3) 3.42 cm^3   Wound Assessment Pale granulation tissue   Drainage Amount Moderate   Drainage Description Serosanguinous   Wound Odor Mild   Tamika-Wound/Incision Assessment Hyperkeratosis (Callous); Maceration   Edges Epibole (rolled edges)   Wound Thickness Description Full thickness   Wound Foot Right;Medial   Date First Assessed/Time First Assessed: 09/07/21 1607   Present on Hospital Admission: Yes  Primary Wound Type: Diabetic Ulcer  Location: Foot  Wound Location Orientation: Right;Medial   Wound Etiology Diabetic   Cleansed Wound cleanser   Dressing/Treatment Collagen with Ag   Offloading for Diabetic Foot Ulcers Felt or foam   Dressing Change Due 09/07/21   Wound Length (cm) 2 cm   Wound Width (cm) 2 cm   Wound Depth (cm) 0.2 cm   Wound Surface Area (cm^2) 4 cm^2   Wound Volume (cm^3) 0.8 cm^3   Post-Procedure Length (cm) 2.5 cm   Post-Procedure Width (cm) 2.5 cm   Post-Procedure Depth (cm) 0.3 cm   Post-Procedure Surface Area (cm^2) 6.25 cm^2   Post-Procedure Volume (cm^3) 1.875 cm^3   Wound Assessment Pale granulation tissue   Drainage Amount Moderate   Drainage Description Serosanguinous   Wound Odor Mild Tamika-Wound/Incision Assessment Maceration   Edges Epibole (rolled edges)   Wound Thickness Description Full thickness     Total Contact Cast    NAME:  Chao Ford  YOB: 1960  MEDICAL RECORD NUMBER:  [id number]  DATE:  9/7/2021    Goal:  Patient will maintain integrity of cast, avoid mobility hazards, and report complications that may occur (foul odor, pain, numbness, cracked cast). Removed old contact cast if indicated and wash extremity with soap and water. Applied ordered dressing. Applied per  guidelines   Applied in clinic to left lower leg. Allow cast to dry. Instructed patient to report to health care provider, including wound care center, any back pain, hip pain, or leg pain, numbness of toes, or any odor coming from the cast.   Instructed patient not to stick any foreign objects down into cast.  Instructed patient to utilize assistive devices(crutches, cane or walker) as ordered. Instructed patient to continue offloading as directed.      Applied cast per  Guidelines  Response to treatment: Well tolerated by patient                     System error images will not attach to flow sheet

## 2021-09-21 ENCOUNTER — HOSPITAL ENCOUNTER (OUTPATIENT)
Dept: WOUND CARE | Age: 61
Discharge: HOME | End: 2021-09-21
Attending: PODIATRIST
Payer: COMMERCIAL

## 2021-09-21 VITALS
TEMPERATURE: 98.6 F | DIASTOLIC BLOOD PRESSURE: 81 MMHG | HEART RATE: 77 BPM | RESPIRATION RATE: 18 BRPM | SYSTOLIC BLOOD PRESSURE: 136 MMHG | OXYGEN SATURATION: 100 %

## 2021-09-21 PROCEDURE — 29445 APPL RIGID TOT CNTC LEG CAST: CPT

## 2021-09-21 PROCEDURE — 11042 DBRDMT SUBQ TIS 1ST 20SQCM/<: CPT

## 2021-09-21 NOTE — DISCHARGE INSTRUCTIONS
Discharge Instructions for  [address]  [address], [facility]  [phone] Fax [phone]    NAME:  Betty De Los Santos  YOB: 1960  MEDICAL RECORD NUMBER:  [id number]  DATE:  9/21/2021    Wound Cleansing:   Do not scrub or use excessive force. Topical Treatments:  Do not apply lotions, creams, or ointments to wound bed unless directed. Dressings:           Wound Location: BLE   [x] Apply Primary Dressing:       [] MediHoney Gel [x] Alginate with Silver [] Alginate   [] Collagen [x] Collagen with Silver   [] Santyl with Moisten saline gauze     [] Hydrocolloid   [] MediHoney Alginate [] Foam with Silver   [] Foam   [] Hydrofera Blue    [] Mepilex Border    [] Moisten with Saline [] Hydrogel [] Mepitel     [] Bactroban/Mupirocin [] Polysporin  [] Other:    [] Pack wound loosely with  [] Iodoform   [] Plain Packing  [] Other   [] Cover and Secure with:     [] Gauze [x] Leonidas [] Kerlix   [] Ace Wrap [] Cover Roll Tape [] ABD     [] Other:    Avoid contact of tape with skin. [x] Change dressing: [] Daily    [] Every Other Day [] Three times per week   [] Once a week [x] Do Not Change Dressing   [] Other:     Edema Control:  Apply: [] Compression Stocking []Right Leg []Left Leg   [] Tubigrip []Right Leg Double Layer []Left Leg Double Layer       []Right Leg Single Layer []Left Leg Single Layer   [] SpandaGrip []Right Leg  []Left Leg      []Low compression 5-10 mm/Hg      []Medium compression 10-20 mm/Hg     []High compression  20-30 mm/Hg   every morning immediately when getting up should be applied to affected leg(s) from mid foot to knee making sure to cover the heel. Remove every night before going to bed. [x] Elevate leg(s) above the level of the heart when sitting. [] Avoid prolonged standing in one place.    [] Elevate arm/hand above the level of the heart []RightArm []LeftArm     Compression:  Apply: [x] Multilayer Compression Wrap Applied in Clinic [x]RightLeg [x]Left Leg   [] Multi-layer compression. Do not get leg(s) with wrap wet. If wraps become too tight call the center or completely remove the wrap. [] Elevate leg(s) above the level of the heart when sitting. [] Avoid prolonged standing in one place. Dietary:  [x] Diet as tolerated: [] Calorie Diabetic Diet: [] No Added Salt:  [x] Increase Protein: [] Other:     Activity:  [x] Activity as tolerated:  [] Patient has no activity restrictions     [] Strict Bedrest: [] Remain off Work:     [] May return to full duty work:                                   [] Return to work with restrictions:             Return Appointment:  [] Wound and dressing supply provider:   [] ECF or Home Healthcare:  [] Wound Assessment: [] Physician or NP scheduled for Wound Assessment:   [x] Return Appointment: With MD  in  1 Week(s)  [] Ordered tests:      Electronically signed April Eliezer Hoang RN on 9/21/2021 at 2:48 PM     [facility] Information: Should you experience any significant changes in your wound(s) or have questions about your wound care, please contact the [facility] at [address] [place] [address] 8:00 am - 4:30. If you need help with your wound outside these hours and cannot wait until we are again available, contact your PCP or go to the hospital emergency room. PLEASE NOTE: IF YOU ARE UNABLE TO OBTAIN WOUND SUPPLIES, CONTINUE TO USE THE SUPPLIES YOU HAVE AVAILABLE UNTIL YOU ARE ABLE TO REACH US. IT IS MOST IMPORTANT TO KEEP THE WOUND COVERED AT ALL TIMES.

## 2021-09-21 NOTE — WOUND CARE
Debridement Wound Care        Problem List Items Addressed This Visit     None          Procedure Note  Indications:  Based on my examination of this patient's wound(s)/ulcer(s) today, debridement is required to promote healing and evaluate the wound base.     Performed by: Jenna Randolph DPM    Consent obtained: Yes    Time out taken: Yes    Debridement: Excisional    Using curette the wound(s)/ulcer(s) was/were sharply debrided down through and including the removal of    epidermis, dermis, subcutaneous tissue and muscle/fascia    Devitalized Tissue Debrided: fibrin, biofilm, slough, exudate and callus    Pre Debridement Measurements:  Are located in the [place]  Documentation Flow Sheet    Wound/Ulcer #: 2    Post Debridement Measurements:  Wound/Ulcer Descriptions are Pre Debridement except measurements:Diabetic ulcer, muscle necrosis    Wound Foot Plantar (Active)   Wound Image     09/21/21 1439   Wound Etiology Diabetic 09/21/21 1439   Dressing Status Breakthrough drainage noted 09/21/21 1439   Cleansed Wound cleanser 09/21/21 1439   Dressing/Treatment Collagen with Ag 09/21/21 1439   Offloading for Diabetic Foot Ulcers Total contact cast 09/21/21 1439   Dressing Change Due 09/21/21 09/21/21 1439   Wound Length (cm) 1 cm 09/21/21 1439   Wound Width (cm) 2.5 cm 09/21/21 1439   Wound Depth (cm) 0.2 cm 09/21/21 1439   Wound Surface Area (cm^2) 2.5 cm^2 09/21/21 1439   Change in Wound Size % 93.06 09/21/21 1439   Wound Volume (cm^3) 0.5 cm^3 09/21/21 1439   Wound Healing % 95 09/21/21 1439   Post-Procedure Length (cm) 1.1 cm 09/21/21 1439   Post-Procedure Width (cm) 2.6 cm 09/21/21 1439   Post-Procedure Depth (cm) 0.3 cm 09/21/21 1439   Post-Procedure Surface Area (cm^2) 2.86 cm^2 09/21/21 1439   Post-Procedure Volume (cm^3) 0.858 cm^3 09/21/21 1439   Wound Assessment Granulation tissue 09/21/21 1439   Drainage Amount Moderate 09/21/21 1439   Drainage Description Serosanguinous 09/21/21 1439   Wound Odor Mild 09/21/21 1439   Tamika-Wound/Incision Assessment Hyperkeratosis (Callous) 09/21/21 1439   Edges Epibole (rolled edges) 09/21/21 1439   Wound Thickness Description Full thickness 09/21/21 1439   Number of days: 49       Wound Foot Right;Medial (Active)   Wound Image    09/21/21 1439   Wound Etiology Diabetic 09/21/21 1439   Dressing Status Breakthrough drainage noted 09/21/21 1439   Cleansed Wound cleanser 09/21/21 1439   Dressing/Treatment Collagen with Ag 09/21/21 1439   Offloading for Diabetic Foot Ulcers Felt or foam 09/21/21 1439   Dressing Change Due 09/28/21 09/21/21 1439   Wound Length (cm) 1.5 cm 09/21/21 1439   Wound Width (cm) 2 cm 09/21/21 1439   Wound Depth (cm) 0.2 cm 09/21/21 1439   Wound Surface Area (cm^2) 3 cm^2 09/21/21 1439   Change in Wound Size % 25 09/21/21 1439   Wound Volume (cm^3) 0.6 cm^3 09/21/21 1439   Wound Healing % 25 09/21/21 1439   Post-Procedure Length (cm) 1.6 cm 09/21/21 1439   Post-Procedure Width (cm) 2.1 cm 09/21/21 1439   Post-Procedure Depth (cm) 0.3 cm 09/21/21 1439   Post-Procedure Surface Area (cm^2) 3.36 cm^2 09/21/21 1439   Post-Procedure Volume (cm^3) 1.008 cm^3 09/21/21 1439   Wound Assessment Granulation tissue 09/21/21 1439   Drainage Amount Moderate 09/21/21 1439   Drainage Description Serosanguinous 09/21/21 1439   Wound Odor None 09/21/21 1439   Tamika-Wound/Incision Assessment Maceration 09/21/21 1439   Edges Defined edges 09/21/21 1439   Wound Thickness Description Full thickness 09/21/21 1439   Number of days: 14        Percent of Wound(s)/Ulcer(s) Debrided: 100%    Total Surface Area Debrided:  approx 6 sq cm see RN' note    Diabetic/Pressure/Non Pressure Ulcers only:  Ulcer:     Estimated Blood Loss:  Minimal     Hemostasis Achieved: Pressure    Procedural Pain: 0 / 10     Post Procedural Pain: 0 / 10     Response to treatment: Well tolerated by patient

## 2021-09-21 NOTE — WOUND CARE
09/21/21 1439   Wound Foot Right;Medial   Date First Assessed/Time First Assessed: 09/07/21 1607   Present on Hospital Admission: Yes  Primary Wound Type: Diabetic Ulcer  Location: Foot  Wound Location Orientation: Right;Medial   Wound Image     Wound Etiology Diabetic   Dressing Status Breakthrough drainage noted   Cleansed Wound cleanser   Dressing/Treatment Collagen with Ag   Offloading for Diabetic Foot Ulcers Felt or foam   Dressing Change Due 09/28/21   Wound Length (cm) 1.5 cm   Wound Width (cm) 2 cm   Wound Depth (cm) 0.2 cm   Wound Surface Area (cm^2) 3 cm^2   Change in Wound Size % 25   Wound Volume (cm^3) 0.6 cm^3   Wound Healing % 25   Post-Procedure Length (cm) 1.6 cm   Post-Procedure Width (cm) 2.1 cm   Post-Procedure Depth (cm) 0.3 cm   Post-Procedure Surface Area (cm^2) 3.36 cm^2   Post-Procedure Volume (cm^3) 1.008 cm^3   Wound Assessment Granulation tissue   Drainage Amount Moderate   Drainage Description Serosanguinous   Wound Odor None   Tamika-Wound/Incision Assessment Maceration   Edges Defined edges   Wound Thickness Description Full thickness   Wound Foot Plantar   Date First Assessed/Time First Assessed: 08/03/21 1457   Present on Hospital Admission: Yes  Primary Wound Type: Diabetic Ulcer  Location: Foot  Wound Location Orientation: Plantar   Wound Image      Wound Etiology Diabetic   Dressing Status Breakthrough drainage noted   Cleansed Wound cleanser   Dressing/Treatment Collagen with Ag   Offloading for Diabetic Foot Ulcers Total contact cast   Dressing Change Due 09/21/21   Wound Length (cm) 1 cm   Wound Width (cm) 2.5 cm   Wound Depth (cm) 0.2 cm   Wound Surface Area (cm^2) 2.5 cm^2   Change in Wound Size % 93.06   Wound Volume (cm^3) 0.5 cm^3   Wound Healing % 95   Post-Procedure Length (cm) 1.1 cm   Post-Procedure Width (cm) 2.6 cm   Post-Procedure Depth (cm) 0.3 cm   Post-Procedure Surface Area (cm^2) 2.86 cm^2   Post-Procedure Volume (cm^3) 0.858 cm^3   Wound Assessment Granulation tissue   Drainage Amount Moderate   Drainage Description Serosanguinous   Wound Odor Mild   Tamika-Wound/Incision Assessment Hyperkeratosis (Callous)   Edges Epibole (rolled edges)   Wound Thickness Description Full thickness     Multilayer Compression Wrap   (Not Unna) Below the Knee    NAME:  Theodore Steve  YOB: 1960  MEDICAL RECORD NUMBER:  [id number]  DATE:  9/21/2021    Removed old Multilayer wrap if indicated and wash leg with mild soap/water. Applied moisturizing agent to dry skin as needed. Applied primary and secondary dressing as ordered. Applied multilayered dressing below the knee to right lower leg. Applied multilayered dressing below the knee to left lower leg. Instructed patient/caregiver not to remove dressing and to keep it clean and dry. Instructed patient/caregiver on complications to report to provider, such as pain, numbness in toes, heavy drainage, and slippage of dressing. Instructed patient on purpose of compression dressing and on activity and exercise recommendations. Response to treatment: Well tolerated by patient       Electronically signed by Chantel Bose RN on 9/21/2021 at 2:44 PM    Total Contact Cast    NAME:  Theodore Steve  YOB: 1960  MEDICAL RECORD NUMBER:  [id number]  DATE:  9/21/2021    Goal:  Patient will maintain integrity of cast, avoid mobility hazards, and report complications that may occur (foul odor, pain, numbness, cracked cast). Removed old contact cast if indicated and wash extremity with soap and water. Applied ordered dressing. Applied per [employer] in clinic to left lower leg. Allow cast to dry.    Instructed patient to report to health care provider, including wound care center, any back pain, hip pain, or leg pain, numbness of toes, or any odor coming from the cast.   Instructed patient not to stick any foreign objects down into cast.  Instructed patient to utilize assistive devices(crutches, cane or walker) as ordered. Instructed patient to continue offloading as directed.      Applied cast per  Guidelines  Response to treatment: Well tolerated by patient     Electronically signed by Sherley Cottrell RN on 9/21/2021 at 2:44 PM

## 2021-09-23 VITALS
RESPIRATION RATE: 18 BRPM | SYSTOLIC BLOOD PRESSURE: 140 MMHG | HEART RATE: 78 BPM | OXYGEN SATURATION: 100 % | DIASTOLIC BLOOD PRESSURE: 80 MMHG | TEMPERATURE: 98.4 F

## 2021-09-24 NOTE — WOUND CARE
09/14/21 1000   Wound Foot Right;Medial   Date First Assessed/Time First Assessed: 09/07/21 1607   Present on Hospital Admission: Yes  Primary Wound Type: Diabetic Ulcer  Location: Foot  Wound Location Orientation: Right;Medial   Wound Image     Wound Etiology Diabetic   Dressing Status Intact   Cleansed Wound cleanser   Dressing/Treatment Collagen with Ag   Offloading for Diabetic Foot Ulcers Felt or foam   Dressing Change Due 09/21/21   Wound Length (cm) 1.5 cm   Wound Width (cm) 1.2 cm   Wound Depth (cm) 0.2 cm   Wound Surface Area (cm^2) 1.8 cm^2   Change in Wound Size % 55   Wound Volume (cm^3) 0.36 cm^3   Wound Healing % 55   Post-Procedure Length (cm) 1.7 cm   Post-Procedure Width (cm) 1.4 cm   Post-Procedure Depth (cm) 0.3 cm   Post-Procedure Surface Area (cm^2) 2.38 cm^2   Post-Procedure Volume (cm^3) 0.714 cm^3   Wound Assessment Devitalized tissue;Pale granulation tissue   Drainage Amount Moderate   Drainage Description Serosanguinous   Wound Odor None   Tamika-Wound/Incision Assessment Fragile; Intact; Maceration   Edges Epibole (rolled edges)   Wound Foot Plantar   Date First Assessed/Time First Assessed: 08/03/21 1457   Present on Hospital Admission: Yes  Primary Wound Type: Diabetic Ulcer  Location: Foot  Wound Location Orientation: Plantar   Wound Image      Wound Etiology Diabetic   Dressing Status Breakthrough drainage noted   Cleansed Wound cleanser   Dressing/Treatment Collagen   Offloading for Diabetic Foot Ulcers Total contact cast   Dressing Change Due 09/21/21   Wound Length (cm) 1 cm   Wound Width (cm) 1.5 cm   Wound Depth (cm) 0.3 cm   Wound Surface Area (cm^2) 1.5 cm^2   Change in Wound Size % 95.83   Wound Volume (cm^3) 0.45 cm^3   Wound Healing % 96   Post-Procedure Length (cm) 1.2 cm   Post-Procedure Width (cm) 1.7 cm   Post-Procedure Depth (cm) 0.4 cm   Post-Procedure Surface Area (cm^2) 2.04 cm^2   Post-Procedure Volume (cm^3) 0.816 cm^3   Wound Assessment Devitalized tissue;Pale granulation tissue   Drainage Amount Moderate   Drainage Description Serosanguinous   Wound Odor None   Tamika-Wound/Incision Assessment Fragile; Intact; Hyperkeratosis (Callous); Maceration   Edges Epibole (rolled edges)     [facility] Application   Below Knee    NAME:  Doretha Woods  YOB: 1960  MEDICAL RECORD NUMBER:  [id number]  DATE:  9/14/2021    Remove old Unna Boot or Boots. Applied moisturizing agent to dry skin as needed. Appied primary and secondary dressing as ordered. Applied Unna roll from toes to knee overlapping each time. Applied ace wrap or coban from toes to below the knee. Instructed patient/caregiver to keep dressing dry and intact. DO NOT REMOVE DRESSING. Instructed pt/family/caregiver to report excessive draining, loose bandage, wet dressing, severe pain or tingling in toes. Applied [employer] dressing below the knee to bilateral lower legs. Unna Boot(s) were applied per  Guidelines. Response to treatment: Well tolerated by patient       Total Contact Cast    NAME:  Doretha Woods  YOB: 1960  MEDICAL RECORD NUMBER:  [id number]  DATE:  9/14/2021    Goal:  Patient will maintain integrity of cast, avoid mobility hazards, and report complications that may occur (foul odor, pain, numbness, cracked cast). Removed old contact cast if indicated and wash extremity with soap and water. Applied ordered dressing. Applied per [facility]  Applied in clinic to left lower leg. Allow cast to dry. Instructed patient to report to health care provider, including wound care center, any back pain, hip pain, or leg pain, numbness of toes, or any odor coming from the cast.   Instructed patient not to stick any foreign objects down into cast.  Instructed patient to utilize assistive devices(crutches, cane or walker) as ordered. Instructed patient to continue offloading as directed.      Applied cast per  Guidelines  Response to treatment: Well tolerated by patient     Electronically signed by Herlinda Calixto RN

## 2021-09-24 NOTE — DISCHARGE INSTRUCTIONS
Discharge Instructions for  [address]  [address], [facility]  [phone] Fax [phone]    NAME:  Osman So  YOB: 1960  MEDICAL RECORD NUMBER:  [id number]  DATE:  9/14/2021    Compression:  Apply: [x] Multilayer Compression Wrap Applied in Clinic [x]RightLeg [x]Left Leg   [x] Multi-layer compression. Do not get leg(s) with wrap wet. If wraps become too tight call the center or completely remove the wrap. [x] Elevate leg(s) above the level of the heart when sitting. [x] Avoid prolonged standing in one place. Contact Cast:  Apply: [x] Total Contact Cast Applied in Clinic []RightLeg [x]Left Leg   [x] Do not get cast wet. Contact center or go to emergency room if there is a foul odor or becomes uncomfortable due to feeling tight or swelling. Do not use objects inside of cast to scratch. Dietary:  [] Diet as tolerated: [x] Calorie Diabetic Diet: [x] No Added Salt:  [x] Increase Protein: [] Other:   Activity:  [x] Activity as tolerated:  [] Patient has no activity restrictions     [] Strict Bedrest: [] Remain off Work:     [] May return to full duty work:                                   [] Return to work with restrictions:             Return Appointment:  [] Wound and dressing supply provider:   [] ECF or Home Healthcare:  [] Wound Assessment: [] Physician or NP scheduled for Wound Assessment:   [x] Return Appointment: With [employer] in  1 Week(s)  [] Ordered tests:      Electronically signed Espinoza Dozier Information: Should you experience any significant changes in your wound(s) or have questions about your wound care, please contact the [facility] Main at [address] [place] [address] 8:00 am - 4:30. If you need help with your wound outside these hours and cannot wait until we are again available, contact your PCP or go to the hospital emergency room.      PLEASE NOTE: IF YOU ARE UNABLE TO OBTAIN WOUND SUPPLIES, CONTINUE TO USE THE SUPPLIES YOU HAVE AVAILABLE UNTIL YOU ARE ABLE TO REACH US. IT IS MOST IMPORTANT TO KEEP THE WOUND COVERED AT ALL TIMES.      Physician Signature:_______________________    Date: ___________ Time:  ____________

## 2021-09-28 ENCOUNTER — HOSPITAL ENCOUNTER (OUTPATIENT)
Dept: WOUND CARE | Age: 61
Discharge: HOME | End: 2021-09-28
Attending: PODIATRIST
Payer: COMMERCIAL

## 2021-09-28 PROCEDURE — 11042 DBRDMT SUBQ TIS 1ST 20SQCM/<: CPT

## 2021-09-28 PROCEDURE — 29445 APPL RIGID TOT CNTC LEG CAST: CPT

## 2021-09-28 PROCEDURE — 11043 DBRDMT MUSC&/FSCA 1ST 20/<: CPT

## 2021-09-28 NOTE — WOUND CARE
Debridement Wound Care        Problem List Items Addressed This Visit     None          Procedure Note  Indications:  Based on my examination of this patient's wound(s)/ulcer(s) today, debridement is required to promote healing and evaluate the wound base.     Performed by: Amos Lesches, DPM    Consent obtained: Yes    Time out taken: Yes    Debridement: Excisional    Using curette the wound(s)/ulcer(s) was/were sharply debrided down through and including the removal of    epidermis, dermis, subcutaneous tissue and muscle/fascia    Devitalized Tissue Debrided: fibrin, biofilm, slough and callus    Pre Debridement Measurements:  Are located in the Wound/Ulcer Documentation Flow Sheet    Wound/Ulcer #: 2    Post Debridement Measurements:  Wound/Ulcer Descriptions are Pre Debridement except measurements:Diabetic ulcer, muscle necrosis    Wound Foot Plantar (Active)   Wound Image     09/21/21 1439   Wound Etiology Diabetic 09/21/21 1439   Dressing Status Breakthrough drainage noted 09/21/21 1439   Cleansed Wound cleanser 09/21/21 1439   Dressing/Treatment Collagen with Ag 09/21/21 1439   Offloading for Diabetic Foot Ulcers Total contact cast 09/21/21 1439   Dressing Change Due 09/21/21 09/21/21 1439   Wound Length (cm) 1 cm 09/21/21 1439   Wound Width (cm) 2.5 cm 09/21/21 1439   Wound Depth (cm) 0.2 cm 09/21/21 1439   Wound Surface Area (cm^2) 2.5 cm^2 09/21/21 1439   Change in Wound Size % 93.06 09/21/21 1439   Wound Volume (cm^3) 0.5 cm^3 09/21/21 1439   Wound Healing % 95 09/21/21 1439   Post-Procedure Length (cm) 1.1 cm 09/21/21 1439   Post-Procedure Width (cm) 2.6 cm 09/21/21 1439   Post-Procedure Depth (cm) 0.3 cm 09/21/21 1439   Post-Procedure Surface Area (cm^2) 2.86 cm^2 09/21/21 1439   Post-Procedure Volume (cm^3) 0.858 cm^3 09/21/21 1439   Wound Assessment Granulation tissue 09/21/21 1439   Drainage Amount Moderate 09/21/21 1439   Drainage Description Serosanguinous 09/21/21 1439   Wound Odor Mild 09/21/21 1439   Tamika-Wound/Incision Assessment Hyperkeratosis (Callous) 09/21/21 1439   Edges Epibole (rolled edges) 09/21/21 1439   Wound Thickness Description Full thickness 09/21/21 1439   Number of days: 56       Wound Foot Right;Medial (Active)   Wound Image    09/21/21 1439   Wound Etiology Diabetic 09/21/21 1439   Dressing Status Breakthrough drainage noted 09/21/21 1439   Cleansed Wound cleanser 09/21/21 1439   Dressing/Treatment Collagen with Ag 09/21/21 1439   Offloading for Diabetic Foot Ulcers Felt or foam 09/21/21 1439   Dressing Change Due 09/28/21 09/21/21 1439   Wound Length (cm) 1.5 cm 09/21/21 1439   Wound Width (cm) 2 cm 09/21/21 1439   Wound Depth (cm) 0.2 cm 09/21/21 1439   Wound Surface Area (cm^2) 3 cm^2 09/21/21 1439   Change in Wound Size % 25 09/21/21 1439   Wound Volume (cm^3) 0.6 cm^3 09/21/21 1439   Wound Healing % 25 09/21/21 1439   Post-Procedure Length (cm) 1.6 cm 09/21/21 1439   Post-Procedure Width (cm) 2.1 cm 09/21/21 1439   Post-Procedure Depth (cm) 0.3 cm 09/21/21 1439   Post-Procedure Surface Area (cm^2) 3.36 cm^2 09/21/21 1439   Post-Procedure Volume (cm^3) 1.008 cm^3 09/21/21 1439   Wound Assessment Granulation tissue 09/21/21 1439   Drainage Amount Moderate 09/21/21 1439   Drainage Description Serosanguinous 09/21/21 1439   Wound Odor None 09/21/21 1439   Tamika-Wound/Incision Assessment Maceration 09/21/21 1439   Edges Defined edges 09/21/21 1439   Wound Thickness Description Full thickness 09/21/21 1439   Number of days: 21        Percent of Wound(s)/Ulcer(s) Debrided: 100%    Total Surface Area Debrided:  approx 3 sq cm see RN's note    Diabetic/Pressure/Non Pressure Ulcers only:  Ulcer:     Estimated Blood Loss:  Minimal     Hemostasis Achieved: Pressure    Procedural Pain: 0 / 10     Post Procedural Pain: 0 / 10     Response to treatment: Well tolerated by patient

## 2021-09-29 VITALS
OXYGEN SATURATION: 100 % | DIASTOLIC BLOOD PRESSURE: 69 MMHG | SYSTOLIC BLOOD PRESSURE: 144 MMHG | RESPIRATION RATE: 18 BRPM | HEART RATE: 82 BPM | TEMPERATURE: 98.6 F

## 2021-09-29 NOTE — WOUND CARE
Multilayer Compression Wrap   (Not Unna) Below the Knee    NAME:  Betty De Los Santos  YOB: 1960  MEDICAL RECORD NUMBER:  [id number]  DATE:  9/28/2021    Removed old Multilayer wrap if indicated and wash leg with mild soap/water. Applied moisturizing agent to dry skin as needed. Applied primary and secondary dressing as ordered. Applied multilayered dressing below the knee to right lower leg. Applied multilayered dressing below the knee to left lower leg. Instructed patient/caregiver not to remove dressing and to keep it clean and dry. Instructed patient/caregiver on complications to report to provider, such as pain, numbness in toes, heavy drainage, and slippage of dressing. Instructed patient on purpose of compression dressing and on activity and exercise recommendations. Total Contact Cast    NAME:  Betty De Los Santos  YOB: 1960  MEDICAL RECORD NUMBER:  [id number]  DATE:  9/28/2021    Goal:  Patient will maintain integrity of cast, avoid mobility hazards, and report complications that may occur (foul odor, pain, numbness, cracked cast). Removed old contact cast if indicated and wash extremity with soap and water. Applied ordered dressing. Applied per MD order  Applied in clinic to left lower leg. Allow cast to dry. Instructed patient to report to health care provider, including wound care center, any back pain, hip pain, or leg pain, numbness of toes, or any odor coming from the cast.   Instructed patient not to stick any foreign objects down into cast.  Instructed patient to utilize assistive devices(crutches, cane or walker) as ordered. Instructed patient to continue offloading as directed.      Applied cast per  Guidelines  Response to treatment: Well tolerated by patient     Electronically signed by Emmy Hill RN on 9/29/2021 at 11:45 AM    Response to treatment: Well tolerated by patient       Electronically signed by Emmy Hill RN on 9/29/2021 at 11:45 AM

## 2021-09-29 NOTE — WOUND CARE
09/28/21 1500   Wound Foot Plantar   Date First Assessed/Time First Assessed: 08/03/21 1457   Present on Hospital Admission: Yes  Primary Wound Type: Diabetic Ulcer  Location: Foot  Wound Location Orientation: Plantar   Wound Image     Wound Etiology Diabetic   Dressing Status Intact;Breakthrough drainage noted   Cleansed Wound cleanser   Dressing/Treatment Collagen;Alginate with Ag;Gauze dressing/dressing sponge;Roll gauze;Coban/self-adherent bandages; Cast  (2 layer wrap)   Offloading for Diabetic Foot Ulcers Total contact cast   Dressing Change Due 10/05/21   Wound Length (cm) 1 cm   Wound Width (cm) 1.5 cm   Wound Depth (cm) 0.2 cm   Wound Surface Area (cm^2) 1.5 cm^2   Change in Wound Size % 95.83   Wound Volume (cm^3) 0.3 cm^3   Wound Healing % 97   Post-Procedure Length (cm) 1.1 cm   Post-Procedure Width (cm) 1.6 cm   Post-Procedure Depth (cm) 0.3 cm   Post-Procedure Surface Area (cm^2) 1.76 cm^2   Post-Procedure Volume (cm^3) 0.528 cm^3   Wound Assessment Granulation tissue;Pink/red   Drainage Amount Small   Drainage Description Serosanguinous   Wound Odor Mild   Tamika-Wound/Incision Assessment Hyperkeratosis (Callous)   Edges Defined edges   Wound Thickness Description Full thickness   Wound Foot Right;Medial   Date First Assessed/Time First Assessed: 09/07/21 1607   Present on Hospital Admission: Yes  Primary Wound Type: Diabetic Ulcer  Location: Foot  Wound Location Orientation: Right;Medial   Wound Image    Wound Etiology Traumatic   Dressing Status Intact   Cleansed Wound cleanser   Dressing/Treatment Collagen;Alginate with Ag;Gauze dressing/dressing sponge;Roll gauze  (2 layer wrap)   Offloading for Diabetic Foot Ulcers Felt or foam   Dressing Change Due 10/05/21   Wound Length (cm) 0.5 cm   Wound Width (cm) 0.5 cm   Wound Depth (cm) 0.1 cm   Wound Surface Area (cm^2) 0.25 cm^2   Change in Wound Size % 93.75   Wound Volume (cm^3) 0.025 cm^3   Wound Healing % 97   Post-Procedure Length (cm) 0.5 cm Post-Procedure Width (cm) 0.5 cm   Post-Procedure Depth (cm) 0.2 cm   Post-Procedure Surface Area (cm^2) 0.25 cm^2   Post-Procedure Volume (cm^3) 0.05 cm^3   Wound Assessment Granulation tissue   Drainage Amount Scant   Drainage Description Serosanguinous   Wound Odor None   Tamika-Wound/Incision Assessment Hyperkeratosis (Callous)   Edges Defined edges   Wound Thickness Description Full thickness

## 2021-10-05 ENCOUNTER — HOSPITAL ENCOUNTER (OUTPATIENT)
Dept: WOUND CARE | Age: 61
Discharge: HOME | End: 2021-10-05
Attending: PODIATRIST
Payer: COMMERCIAL

## 2021-10-05 PROCEDURE — 11042 DBRDMT SUBQ TIS 1ST 20SQCM/<: CPT

## 2021-10-05 RX ORDER — LIDOCAINE HYDROCHLORIDE 20 MG/ML
JELLY TOPICAL ONCE
Status: CANCELLED | OUTPATIENT
Start: 2021-10-05 | End: 2021-10-05

## 2021-10-05 NOTE — PROGRESS NOTES
Debridement Wound Care        Problem List Items Addressed This Visit     None          Procedure Note  Indications:  Based on my examination of this patient's wound(s)/ulcer(s) today, debridement is required to promote healing and evaluate the wound base.     Performed by: Lisandra Ndiaye DPM    Consent obtained: Yes    Time out taken: Yes    Debridement: Excisional    Using  Curette the wound(s)/ulcer(s) was/were sharply debrided down through and including the removal of    epidermis, dermis, subcutaneous tissue and muscle/fascia    Devitalized Tissue Debrided: fibrin, biofilm, slough and exudate    Pre Debridement Measurements:  Are located in the [place]  Documentation Flow Sheet    Wound/Ulcer #: 1    Post Debridement Measurements:  Wound/Ulcer Descriptions are Pre Debridement except measurements:Diabetic ulcer, muscle necrosis    Wound Foot Plantar (Active)   Wound Length (cm) 1 cm 10/05/21 1049   Wound Width (cm) 1.5 cm 10/05/21 1049   Wound Depth (cm) 0.1 cm 10/05/21 1049   Wound Surface Area (cm^2) 1.5 cm^2 10/05/21 1049   Change in Wound Size % 95.83 10/05/21 1049   Wound Volume (cm^3) 0.15 cm^3 10/05/21 1049   Wound Healing % 99 10/05/21 1049   Post-Procedure Length (cm) 1.5 cm 10/05/21 1049   Post-Procedure Width (cm) 2 cm 10/05/21 1049   Post-Procedure Depth (cm) 0.2 cm 10/05/21 1049   Post-Procedure Surface Area (cm^2) 3 cm^2 10/05/21 1049   Post-Procedure Volume (cm^3) 0.6 cm^3 10/05/21 1049   Number of days: 63       Wound Foot Right;Medial (Active)   Number of days: 28        Percent of Wound(s)/Ulcer(s) Debrided: 100%    Total Surface Area Debrided:  Approx 1.5 sq cm See RN's note    Diabetic/Pressure/Non Pressure Ulcers only:  Ulcer:     Estimated Blood Loss:  Minimal     Hemostasis Achieved: Pressure    Procedural Pain: 0 / 10     Post Procedural Pain: 0 / 10     Response to treatment: Well tolerated by patient

## 2021-10-06 VITALS
SYSTOLIC BLOOD PRESSURE: 136 MMHG | TEMPERATURE: 98.6 F | RESPIRATION RATE: 18 BRPM | HEART RATE: 81 BPM | OXYGEN SATURATION: 100 % | DIASTOLIC BLOOD PRESSURE: 71 MMHG

## 2021-10-06 NOTE — WOUND CARE
10/05/21 1049   Wound Foot Plantar   Date First Assessed/Time First Assessed: 08/03/21 1457   Present on Hospital Admission: Yes  Primary Wound Type: Diabetic Ulcer  Location: Foot  Wound Location Orientation: Plantar   Wound Image     Wound Etiology Diabetic   Dressing Status Intact   Cleansed Wound cleanser   Dressing/Treatment Cast;Cast padding  (narciso, marathon, mepitel, alginate, exudry, arjun)   Offloading for Diabetic Foot Ulcers Total contact cast   Dressing Change Due 10/12/21   Wound Length (cm) 1 cm   Wound Width (cm) 1.5 cm   Wound Depth (cm) 0.1 cm   Wound Surface Area (cm^2) 1.5 cm^2   Change in Wound Size % 95.83   Wound Volume (cm^3) 0.15 cm^3   Wound Healing % 99   Post-Procedure Length (cm) 1.5 cm   Post-Procedure Width (cm) 2 cm   Post-Procedure Depth (cm) 0.2 cm   Post-Procedure Surface Area (cm^2) 3 cm^2   Post-Procedure Volume (cm^3) 0.6 cm^3   Wound Assessment Granulation tissue   Drainage Amount Small   Drainage Description Serosanguinous   Wound Odor Mild   Tamika-Wound/Incision Assessment Hyperkeratosis (Callous)   Edges Defined edges   Wound Thickness Description Full thickness   [REMOVED] Wound Foot Right;Medial   Final Assessment Date/Final Assessment Time: 10/05/21 1045  Date First Assessed/Time First Assessed: 09/07/21 1607   Present on Hospital Admission: Yes  Primary Wound Type: Diabetic Ulcer  Location: Foot  Wound Location Orientation: Right;Medial  [place]. ..    Wound Image    Wound Length (cm) 0 cm   Wound Width (cm) 0 cm   Wound Depth (cm) 0 cm   Wound Surface Area (cm^2) 0 cm^2   Change in Wound Size % 100   Wound Volume (cm^3) 0 cm^3   Wound Healing % 100   Wound Assessment Epithelialization     Total Contact Cast    NAME:  Nyla Hand  YOB: 1960  MEDICAL RECORD NUMBER:  [id number]  DATE:  10/5/2021    Goal:  Patient will maintain integrity of cast, avoid mobility hazards, and report complications that may occur (foul odor, pain, numbness, cracked cast). Removed old contact cast if indicated and wash extremity with soap and water. Applied ordered dressing. Applied per  guidelines   Applied in clinic to left lower leg. Allow cast to dry. Instructed patient to report to health care provider, including wound care center, any back pain, hip pain, or leg pain, numbness of toes, or any odor coming from the cast.   Instructed patient not to stick any foreign objects down into cast.  Instructed patient to utilize assistive devices(crutches, cane or walker) as ordered. Instructed patient to continue offloading as directed. Applied cast per  Guidelines  Response to treatment: Well tolerated by patient     Electronically signed by Carly Pacheco RN on 10/6/2021 at 10:47 AM    Multilayer Compression Wrap   (Not [place]) Below the Knee    NAME:  Annalisa Harris  YOB: 1960  MEDICAL RECORD NUMBER:  [id number]  DATE:  10/5/2021    Removed old Multilayer wrap if indicated and wash leg with mild soap/water. Applied moisturizing agent to dry skin as needed. Applied primary and secondary dressing as ordered. Applied multilayered dressing below the knee to left lower leg. Instructed patient/caregiver not to remove dressing and to keep it clean and dry. Instructed patient/caregiver on complications to report to provider, such as pain, numbness in toes, heavy drainage, and slippage of dressing. Instructed patient on purpose of compression dressing and on activity and exercise recommendations.     Response to treatment: Well tolerated by patient       Electronically signed by Carly Pacheco RN on 10/6/2021 at 10:48 AM

## 2021-10-06 NOTE — DISCHARGE INSTRUCTIONS
Discharge Instructions for  [address]  [address] [place], [place], [address] [facility]  [phone] Fax [phone]    NAME:  Saba Wilkins  YOB: 1960  MEDICAL RECORD NUMBER:  [id number]  DATE:  10/5/2021    Wound Cleansing:   Do not scrub or use excessive force. Topical Treatments:  Do not apply lotions, creams, or ointments to wound bed unless directed. Dressings:           Wound Location LLE   [] Apply Primary Dressing:       [] MediHoney Gel [] Alginate with Silver [x] Alginate   [x] Collagen [] Collagen with Silver   [] Santyl with Moisten saline gauze     [] Hydrocolloid   [] MediHoney Alginate [] Foam with Silver   [] Foam   [] Hydrofera Blue    [] Mepilex Border    [] Moisten with Saline [] Hydrogel [] Mepitel     [] Bactroban/Mupirocin [] Polysporin  [] Other:    [] Pack wound loosely with  [] Iodoform   [] Plain Packing  [] Other   [x] Cover and Secure with:     [] Gauze [x] Leonidas [] Kerlix   [] Ace Wrap [] Cover Roll Tape [] ABD     [] Other:    Avoid contact of tape with skin. [] Change dressing: [] Daily    [] Every Other Day [] Three times per week   [] Once a week [x] Do Not Change Dressing   [] Other:     Compression:  Apply: [x] Multilayer Compression Wrap Applied in Clinic []RightLeg [x]Left Leg   [] Multi-layer compression. Do not get leg(s) with wrap wet. If wraps become too tight call the center or completely remove the wrap. [x] Elevate leg(s) above the level of the heart when sitting. [x] Avoid prolonged standing in one place. Contact Cast:  Apply: [x] Total Contact Cast Applied in Clinic []RightLeg [x]Left Leg   [x] Do not get cast wet. Contact center or go to emergency room if there is a foul odor or becomes uncomfortable due to feeling tight or swelling. Do not use objects inside of cast to scratch.       Dietary:  [] Diet as tolerated: [x] Calorie Diabetic Diet: [] No Added Salt:  [] Increase Protein: [] Other:   Activity:  [x] Activity as tolerated:  [] Patient has no activity restrictions     [] Strict Bedrest: [] Remain off Work:     [] May return to full duty work:                                   [] Return to work with restrictions:             Return Appointment:  [] Wound and dressing supply provider:   [] ECF or Home Healthcare:  [] Wound Assessment: [] Physician or NP scheduled for Wound Assessment:   [x] Return Appointment: With MD  in  1 Week(s)  [] Ordered tests:      Electronically signed April Corby Moscoso RN on 10/6/2021 at 10:49 AM     Lexi Patrick 281: Should you experience any significant changes in your wound(s) or have questions about your wound care, please contact the [facility] at [address] [place] [address] 8:00 am - 4:30. If you need help with your wound outside these hours and cannot wait until we are again available, contact your PCP or go to the hospital emergency room. PLEASE NOTE: IF YOU ARE UNABLE TO OBTAIN WOUND SUPPLIES, CONTINUE TO USE THE SUPPLIES YOU HAVE AVAILABLE UNTIL YOU ARE ABLE TO REACH US. IT IS MOST IMPORTANT TO KEEP THE WOUND COVERED AT ALL TIMES.

## 2021-10-12 ENCOUNTER — HOSPITAL ENCOUNTER (OUTPATIENT)
Dept: WOUND CARE | Age: 61
Discharge: HOME | End: 2021-10-12
Attending: PODIATRIST
Payer: COMMERCIAL

## 2021-10-12 VITALS
SYSTOLIC BLOOD PRESSURE: 141 MMHG | RESPIRATION RATE: 18 BRPM | TEMPERATURE: 98.3 F | HEART RATE: 78 BPM | DIASTOLIC BLOOD PRESSURE: 80 MMHG | OXYGEN SATURATION: 100 %

## 2021-10-12 DIAGNOSIS — L97.523 NON-HEALING ULCER OF LEFT FOOT WITH NECROSIS OF MUSCLE (HCC): Primary | ICD-10-CM

## 2021-10-12 PROCEDURE — 29445 APPL RIGID TOT CNTC LEG CAST: CPT

## 2021-10-12 PROCEDURE — 29581 APPL MULTLAYER CMPRN SYS LEG: CPT

## 2021-10-12 RX ORDER — LIDOCAINE HYDROCHLORIDE 20 MG/ML
JELLY TOPICAL ONCE
Status: CANCELLED | OUTPATIENT
Start: 2021-10-12 | End: 2021-10-12

## 2021-10-12 NOTE — WOUND CARE
10/12/21 1521   Wound Foot Plantar;Left   Date First Assessed/Time First Assessed: 08/03/21 1457   Present on Hospital Admission: Yes  Primary Wound Type: Diabetic Ulcer  Location: Foot  Wound Location Orientation: Plantar;Left   Wound Image    Wound Etiology Diabetic   Dressing Status Intact   Cleansed Wound cleanser   Dressing/Treatment Cast;Alginate with Ag;Collagen;Gauze dressing/dressing sponge;Roll gauze;Coban/self-adherent bandages  (2 layer wrap)   Offloading for Diabetic Foot Ulcers Total contact cast   Dressing Change Due 10/19/21   Wound Length (cm) 1 cm   Wound Width (cm) 1.2 cm   Wound Depth (cm) 0.1 cm   Wound Surface Area (cm^2) 1.2 cm^2   Change in Wound Size % 96.67   Wound Volume (cm^3) 0.12 cm^3   Wound Healing % 99   Wound Assessment Pale granulation tissue   Drainage Amount Small   Drainage Description Serosanguinous   Wound Odor None   Tamika-Wound/Incision Assessment Hyperkeratosis (Callous)   Edges Attached edges; Defined edges   Wound Thickness Description Full thickness

## 2021-10-12 NOTE — WOUND CARE
Multilayer Compression Wrap   (Not Unna) Below the Knee    NAME:  Valarie May  YOB: 1960  MEDICAL RECORD NUMBER:  [id number]  DATE:  10/12/2021    Removed old Multilayer wrap if indicated and wash leg with mild soap/water. Applied moisturizing agent to dry skin as needed. Applied primary and secondary dressing as ordered. Applied multilayered dressing below the knee to left lower leg. Instructed patient/caregiver not to remove dressing and to keep it clean and dry. Instructed patient/caregiver on complications to report to provider, such as pain, numbness in toes, heavy drainage, and slippage of dressing. Instructed patient on purpose of compression dressing and on activity and exercise recommendations. Response to treatment: Well tolerated by patient       Electronically signed by Kelley Ralph RN on 10/12/2021 at 3:23 PM        Total Contact Cast    NAME:  Valarie May  YOB: 1960  MEDICAL RECORD NUMBER:  [id number]  DATE:  10/12/2021    Goal:  Patient will maintain integrity of cast, avoid mobility hazards, and report complications that may occur (foul odor, pain, numbness, cracked cast). Removed old contact cast if indicated and wash extremity with soap and water. Applied ordered dressing. Applied per MD order  Applied in clinic to left lower leg. Allow cast to dry. Instructed patient to report to health care provider, including wound care center, any back pain, hip pain, or leg pain, numbness of toes, or any odor coming from the cast.   Instructed patient not to stick any foreign objects down into cast.  Instructed patient to utilize assistive devices(crutches, cane or walker) as ordered. Instructed patient to continue offloading as directed.      Applied cast per  Guidelines  Response to treatment: Well tolerated by patient     Electronically signed by Kelley Ralph RN on 10/12/2021 at 3:24 PM

## 2021-10-19 ENCOUNTER — HOSPITAL ENCOUNTER (OUTPATIENT)
Dept: WOUND CARE | Age: 61
Discharge: HOME | End: 2021-10-19
Attending: PODIATRIST
Payer: COMMERCIAL

## 2021-10-19 DIAGNOSIS — L97.523 NON-HEALING ULCER OF LEFT FOOT WITH NECROSIS OF MUSCLE (HCC): Primary | ICD-10-CM

## 2021-10-19 PROCEDURE — 99214 OFFICE O/P EST MOD 30 MIN: CPT

## 2021-10-19 NOTE — PROGRESS NOTES
Debridement Wound Care        Problem List Items Addressed This Visit     None      BS this AM was 256    No orders of the defined types were placed in this encounter. Procedure Note  Indications:  Based on my examination of this patient's wound(s)/ulcer(s) today, debridement is required to promote healing and evaluate the wound base.     Performed by: Vikki Villalobos DPM    Consent obtained: Yes    Time out taken: Yes    Debridement: Excisional    Using  Curette the wound(s)/ulcer(s) was/were sharply debrided down through and including the removal of    epidermis, dermis and subcutaneous tissue    Devitalized Tissue Debrided: fibrin, biofilm and slough    Pre Debridement Measurements:  Are located in the [facility]  Documentation Flow Sheet    Wound/Ulcer #: 1    Post Debridement Measurements:  Wound/Ulcer Descriptions are Pre Debridement except measurements: Diabetic ulcer, fat layer exposed    Wound Foot Plantar;Left (Active)   Wound Image   10/12/21 1521   Wound Etiology Diabetic 10/12/21 1521   Dressing Status Intact 10/12/21 1521   Cleansed Wound cleanser 10/12/21 1521   Dressing/Treatment Cast;Alginate with Ag;Collagen;Gauze dressing/dressing sponge;Roll gauze;Coban/self-adherent bandages 10/12/21 1521   Offloading for Diabetic Foot Ulcers Total contact cast 10/12/21 1521   Dressing Change Due 10/19/21 10/12/21 1521   Wound Length (cm) 1 cm 10/12/21 1521   Wound Width (cm) 1.2 cm 10/12/21 1521   Wound Depth (cm) 0.1 cm 10/12/21 1521   Wound Surface Area (cm^2) 1.2 cm^2 10/12/21 1521   Change in Wound Size % 96.67 10/12/21 1521   Wound Volume (cm^3) 0.12 cm^3 10/12/21 1521   Wound Healing % 99 10/12/21 1521   Post-Procedure Length (cm) 1.5 cm 10/05/21 1049   Post-Procedure Width (cm) 2 cm 10/05/21 1049   Post-Procedure Depth (cm) 0.2 cm 10/05/21 1049   Post-Procedure Surface Area (cm^2) 3 cm^2 10/05/21 1049   Post-Procedure Volume (cm^3) 0.6 cm^3 10/05/21 1049   Wound Assessment Pale granulation tissue 10/12/21 1521   Drainage Amount Small 10/12/21 1521   Drainage Description Serosanguinous 10/12/21 1521   Wound Odor None 10/12/21 1521   Tamika-Wound/Incision Assessment Hyperkeratosis (Callous) 10/12/21 1521   Edges Attached edges; Defined edges 10/12/21 1521   Wound Thickness Description Full thickness 10/12/21 1521   Number of days: 77        Percent of Wound(s)/Ulcer(s) Debrided: 100%    Total Surface Area Debrided:  Approx 2 sq cm See RN's note    Diabetic/Pressure/Non Pressure Ulcers only: Diabetic ulcer, fat layer exposed  Ulcer:     Estimated Blood Loss:  Minimal     Hemostasis Achieved: Pressure    Procedural Pain: 0 / 10     Post Procedural Pain: 0 / 10     Response to treatment: Well tolerated by patient    Patient to go to  to get shoe casting for DM shoes

## 2021-10-20 VITALS
RESPIRATION RATE: 18 BRPM | SYSTOLIC BLOOD PRESSURE: 150 MMHG | HEART RATE: 75 BPM | OXYGEN SATURATION: 100 % | DIASTOLIC BLOOD PRESSURE: 88 MMHG | TEMPERATURE: 98.6 F

## 2021-10-20 RX ORDER — LIDOCAINE HYDROCHLORIDE 20 MG/ML
JELLY TOPICAL ONCE
Status: CANCELLED | OUTPATIENT
Start: 2021-10-20 | End: 2021-10-20

## 2021-10-20 NOTE — WOUND CARE
10/19/21 1021   Wound Foot Plantar;Left   Date First Assessed/Time First Assessed: 08/03/21 1457   Present on Hospital Admission: Yes  Primary Wound Type: Diabetic Ulcer  Location: Foot  Wound Location Orientation: Plantar;Left   Wound Image    Wound Etiology Diabetic   Dressing Status Clean;Dry; Intact;Breakthrough drainage noted   Cleansed Wound cleanser   Dressing/Treatment Alginate with Ag;Collagen;Gauze dressing/dressing sponge;Foam;Roll gauze;Coban/self-adherent bandages  (mepitel one)   Offloading for Diabetic Foot Ulcers Post-op shoe   Dressing Change Due 10/26/21   Wound Length (cm) 0.8 cm   Wound Width (cm) 1 cm   Wound Depth (cm) 0.1 cm   Wound Surface Area (cm^2) 0.8 cm^2   Change in Wound Size % 97.78   Wound Volume (cm^3) 0.08 cm^3   Wound Healing % 99   Wound Assessment Pale granulation tissue   Drainage Amount Scant   Drainage Description Serous   Wound Odor Mild   Tamika-Wound/Incision Assessment Hyperkeratosis (Callous)   Edges Attached edges   Wound Thickness Description Full thickness

## 2021-10-26 ENCOUNTER — HOSPITAL ENCOUNTER (OUTPATIENT)
Dept: WOUND CARE | Age: 61
Discharge: HOME | End: 2021-10-26
Attending: PODIATRIST
Payer: COMMERCIAL

## 2021-10-26 DIAGNOSIS — L97.523 NON-HEALING ULCER OF LEFT FOOT WITH NECROSIS OF MUSCLE (HCC): Primary | ICD-10-CM

## 2021-10-26 PROCEDURE — 11042 DBRDMT SUBQ TIS 1ST 20SQCM/<: CPT

## 2021-10-26 RX ORDER — LIDOCAINE HYDROCHLORIDE 20 MG/ML
JELLY TOPICAL ONCE
Status: CANCELLED | OUTPATIENT
Start: 2021-10-26 | End: 2021-10-26

## 2021-10-26 NOTE — PROGRESS NOTES
Debridement Wound Care        Problem List Items Addressed This Visit     None          No orders of the defined types were placed in this encounter. Procedure Note  Indications:  Based on my examination of this patient's wound(s)/ulcer(s) today, debridement is required to promote healing and evaluate the wound base. Performed by: Hamida Mittal DPM    Consent obtained: Yes    Time out taken: Yes    Debridement: Excisional    Using  #15 blade the wound(s)/ulcer(s) was/were sharply debrided down through and including the removal of    epidermis, dermis and subcutaneous tissue    Devitalized Tissue Debrided: fibrin, biofilm, slough and callus    Pre Debridement Measurements:  Are located in the Wound/Ulcer Documentation Flow Sheet    Wound/Ulcer #: 1    Post Debridement Measurements:  Wound/Ulcer Descriptions are Pre Debridement except measurements: Diabetic ulcer, fat layer exposed    Wound Foot Plantar;Left (Active)   Wound Image   10/19/21 1021   Wound Etiology Diabetic 10/19/21 1021   Dressing Status Clean;Dry; Intact;Breakthrough drainage noted 10/19/21 1021   Cleansed Wound cleanser 10/19/21 1021   Dressing/Treatment Alginate with Ag;Collagen;Gauze dressing/dressing sponge;Foam;Roll gauze;Coban/self-adherent bandages 10/19/21 1021   Offloading for Diabetic Foot Ulcers Post-op shoe 10/19/21 1021   Dressing Change Due 10/26/21 10/19/21 1021   Wound Length (cm) 0.8 cm 10/19/21 1021   Wound Width (cm) 1 cm 10/19/21 1021   Wound Depth (cm) 0.1 cm 10/19/21 1021   Wound Surface Area (cm^2) 0.8 cm^2 10/19/21 1021   Change in Wound Size % 97.78 10/19/21 1021   Wound Volume (cm^3) 0.08 cm^3 10/19/21 1021   Wound Healing % 99 10/19/21 1021   Post-Procedure Length (cm) 1.5 cm 10/05/21 1049   Post-Procedure Width (cm) 2 cm 10/05/21 1049   Post-Procedure Depth (cm) 0.2 cm 10/05/21 1049   Post-Procedure Surface Area (cm^2) 3 cm^2 10/05/21 1049   Post-Procedure Volume (cm^3) 0.6 cm^3 10/05/21 1049   Wound Assessment Pale granulation tissue 10/19/21 1021   Drainage Amount Scant 10/19/21 1021   Drainage Description Serous 10/19/21 1021   Wound Odor Mild 10/19/21 1021   Tamika-Wound/Incision Assessment Hyperkeratosis (Callous) 10/19/21 1021   Edges Attached edges 10/19/21 1021   Wound Thickness Description Full thickness 10/19/21 1021   Number of days: 84        Percent of Wound(s)/Ulcer(s) Debrided: 100%    Total Surface Area Debrided:  Approx 2 sq cm See RN's note    Diabetic/Pressure/Non Pressure Ulcers only: Diabetic ulcer, fat layer exposed  Ulcer:     Estimated Blood Loss:  None    Hemostasis Achieved: Pressure    Procedural Pain: 0 / 10     Post Procedural Pain: 0 / 10     Response to treatment: Well tolerated by patient

## 2021-10-27 VITALS
HEART RATE: 78 BPM | RESPIRATION RATE: 18 BRPM | DIASTOLIC BLOOD PRESSURE: 90 MMHG | OXYGEN SATURATION: 100 % | TEMPERATURE: 97.9 F | SYSTOLIC BLOOD PRESSURE: 158 MMHG

## 2021-10-27 NOTE — WOUND CARE
10/26/21 0901   Wound Foot Plantar;Left   Date First Assessed/Time First Assessed: 08/03/21 1457   Present on Hospital Admission: Yes  Primary Wound Type: Diabetic Ulcer  Location: Foot  Wound Location Orientation: Plantar;Left   Wound Image    Wound Etiology Diabetic   Dressing Status Breakthrough drainage noted; Intact   Cleansed Wound cleanser   Dressing/Treatment Alginate with Ag;Collagen;Foam;Gauze dressing/dressing sponge;Roll gauze;Tape/Soft cloth adhesive tape  (2 layer wrap)   Offloading for Diabetic Foot Ulcers Post-op shoe   Dressing Change Due 11/02/21   Wound Length (cm) 0.8 cm   Wound Width (cm) 1 cm   Wound Depth (cm) 0.2 cm   Wound Surface Area (cm^2) 0.8 cm^2   Change in Wound Size % 97.78   Wound Volume (cm^3) 0.16 cm^3   Wound Healing % 99   Post-Procedure Length (cm) 0.9 cm   Post-Procedure Width (cm) 1.1 cm   Post-Procedure Depth (cm) 0.3 cm   Post-Procedure Surface Area (cm^2) 0.99 cm^2   Post-Procedure Volume (cm^3) 0.297 cm^3   Wound Assessment Pink/red;Pale granulation tissue   Drainage Amount Scant   Drainage Description Serous   Wound Odor None   Tamika-Wound/Incision Assessment Hyperkeratosis (Callous)   Edges Attached edges   Wound Thickness Description Full thickness

## 2021-10-27 NOTE — WOUND CARE
Multilayer Compression Wrap   (Not Unna) Below the Knee    NAME:  Ashu Miller  YOB: 1960  MEDICAL RECORD NUMBER:  [id number]  DATE:  10/26/2021    Removed old Multilayer wrap if indicated and wash leg with mild soap/water. Applied moisturizing agent to dry skin as needed. Applied primary and secondary dressing as ordered. Applied multilayered dressing below the knee to left lower leg. Instructed patient/caregiver not to remove dressing and to keep it clean and dry. Instructed patient/caregiver on complications to report to provider, such as pain, numbness in toes, heavy drainage, and slippage of dressing. Instructed patient on purpose of compression dressing and on activity and exercise recommendations.     Response to treatment: Well tolerated by patient       Electronically signed by Doe Mims RN on 10/27/2021 at 9:05 AM

## 2021-11-02 ENCOUNTER — HOSPITAL ENCOUNTER (OUTPATIENT)
Dept: WOUND CARE | Age: 61
Discharge: HOME | End: 2021-11-02
Attending: PODIATRIST
Payer: COMMERCIAL

## 2021-11-02 VITALS
TEMPERATURE: 98.2 F | RESPIRATION RATE: 18 BRPM | DIASTOLIC BLOOD PRESSURE: 85 MMHG | SYSTOLIC BLOOD PRESSURE: 155 MMHG | OXYGEN SATURATION: 100 % | HEART RATE: 79 BPM

## 2021-11-02 DIAGNOSIS — L97.523 NON-HEALING ULCER OF LEFT FOOT WITH NECROSIS OF MUSCLE (HCC): Primary | ICD-10-CM

## 2021-11-02 PROCEDURE — 11042 DBRDMT SUBQ TIS 1ST 20SQCM/<: CPT

## 2021-11-02 RX ORDER — LIDOCAINE HYDROCHLORIDE 20 MG/ML
JELLY TOPICAL ONCE
Status: CANCELLED | OUTPATIENT
Start: 2021-11-02 | End: 2021-11-02

## 2021-11-02 NOTE — WOUND CARE
11/02/21 0920 Wound Foot Plantar;Left Date First Assessed/Time First Assessed: 08/03/21 1457   Present on Hospital Admission: Yes  Primary Wound Type: Diabetic Ulcer  Location: Foot  Wound Location Orientation: Plantar;Left Wound Image Wound Etiology Diabetic Dressing Status Breakthrough drainage noted Cleansed Wound cleanser Dressing/Treatment Collagen Offloading for Diabetic Foot Ulcers Felt or foam  
Dressing Change Due 11/09/21 Wound Length (cm) 0.5 cm Wound Width (cm) 1 cm Wound Depth (cm) 0.2 cm Wound Surface Area (cm^2) 0.5 cm^2 Change in Wound Size % 98.61 Wound Volume (cm^3) 0.1 cm^3 Wound Healing % 99 Post-Procedure Length (cm) 0.7 cm Post-Procedure Width (cm) 1.2 cm Post-Procedure Depth (cm) 0.3 cm Post-Procedure Surface Area (cm^2) 0.84 cm^2 Post-Procedure Volume (cm^3) 0.252 cm^3 Wound Assessment Devitalized tissue;Pink/red Drainage Amount Small Drainage Description Serosanguinous Wound Odor None Tamika-Wound/Incision Assessment Hyperkeratosis (Callous) Edges Attached edges Applied collagen, mepitel one, alginate, felt offload, exudry, arjun and 2 layer. Multilayer Compression Wrap 
 (Not Unna) Below the Knee NAME:  Henrietta Bill YOB: 1960 MEDICAL RECORD NUMBER:  [id number] DATE:  11/2/2021 Removed old Multilayer wrap if indicated and wash leg with mild soap/water. Applied moisturizing agent to dry skin as needed. Applied primary and secondary dressing as ordered. Applied multilayered dressing below the knee to left lower leg. Instructed patient/caregiver not to remove dressing and to keep it clean and dry. Instructed patient/caregiver on complications to report to provider, such as pain, numbness in toes, heavy drainage, and slippage of dressing. Instructed patient on purpose of compression dressing and on activity and exercise recommendations.  
 
Response to treatment: Well tolerated by patient Electronically signed by Sahara Mckeon RN on 11/2/2021

## 2021-11-02 NOTE — PROGRESS NOTES
Debridement Wound Care        Problem List Items Addressed This Visit        Other    Non-healing ulcer of left foot ([address] - Primary    Relevant Orders    INITIATE OUTPATIENT WOUND CARE PROTOCOL          No orders of the defined types were placed in this encounter. Procedure Note  Indications:  Based on my examination of this patient's wound(s)/ulcer(s) today, debridement is required to promote healing and evaluate the wound base.     Performed by: Claude Sanderson DPM    Consent obtained: Yes    Time out taken: Yes    Debridement: Excisional    Using  Curette and #10 blade the wound(s)/ulcer(s) was/were sharply debrided down through and including the removal of    epidermis, dermis and subcutaneous tissue    Devitalized Tissue Debrided: fibrin, biofilm, slough and callus    Pre Debridement Measurements:  Are located in the [facility]  Documentation Flow Sheet    Wound/Ulcer #: 1    Post Debridement Measurements:  Wound/Ulcer Descriptions are Pre Debridement except measurements: Diabetic ulcer, fat layer exposed    Wound Foot Plantar;Left (Active)   Wound Image   11/02/21 0920   Wound Etiology Diabetic 11/02/21 0920   Dressing Status Breakthrough drainage noted 11/02/21 0920   Cleansed Wound cleanser 11/02/21 0920   Dressing/Treatment Collagen 11/02/21 0920   Offloading for Diabetic Foot Ulcers Felt or foam 11/02/21 0920   Dressing Change Due 11/09/21 11/02/21 0920   Wound Length (cm) 0.5 cm 11/02/21 0920   Wound Width (cm) 1 cm 11/02/21 0920   Wound Depth (cm) 0.2 cm 11/02/21 0920   Wound Surface Area (cm^2) 0.5 cm^2 11/02/21 0920   Change in Wound Size % 98.61 11/02/21 0920   Wound Volume (cm^3) 0.1 cm^3 11/02/21 0920   Wound Healing % 99 11/02/21 0920   Post-Procedure Length (cm) 0.9 cm 10/26/21 0901   Post-Procedure Width (cm) 1.1 cm 10/26/21 0901   Post-Procedure Depth (cm) 0.3 cm 10/26/21 0901   Post-Procedure Surface Area (cm^2) 0.99 cm^2 10/26/21 0901   Post-Procedure Volume (cm^3) 0.297 cm^3 10/26/21 0901   Wound Assessment Devitalized tissue;Pink/red 11/02/21 0920   Drainage Amount Small 11/02/21 0920   Drainage Description Serosanguinous 11/02/21 0920   Wound Odor None 11/02/21 0920   Tamika-Wound/Incision Assessment Hyperkeratosis (Callous) 11/02/21 0920   Edges Attached edges 11/02/21 0920   Wound Thickness Description Full thickness 10/26/21 0901   Number of days: 91        Percent of Wound(s)/Ulcer(s) Debrided: 100%    Total Surface Area Debrided:  Approx 4 sq cm See RN's note    Diabetic/Pressure/Non Pressure Ulcers only: Diabetic ulcer, fat layer exposed  Ulcer:     Estimated Blood Loss:  Minimal     Hemostasis Achieved: Pressure    Procedural Pain: 0 / 10     Post Procedural Pain: 0 / 10     Response to treatment: Well tolerated by patient

## 2021-11-02 NOTE — DISCHARGE INSTRUCTIONS
Discharge Instructions for  [address]  [address] [place], [place], [address] [facility]  [phone] Fax [phone]    NAME:  Abby Bernal  YOB: 1960  MEDICAL RECORD NUMBER:  [id number]  DATE:  11/2/2021      Compression:  Apply: [x] Multilayer Compression Wrap Applied in Clinic []RightLeg []Left Leg   [x] Multi-layer compression. Do not get leg(s) with wrap wet. If wraps become too tight call the center or completely remove the wrap. [x] Elevate leg(s) above the level of the heart when sitting. [x] Avoid prolonged standing in one place. Off-Loading:   [x] Off-loading when [x] walking  [] in bed [] sitting  [] Total non-weight bearing  [] Right Leg  [] Left Leg   [] Assistive Device [] Walker [] Cane  [] Wheelchair  [] Crutches   [] Surgical shoe    [] Podus Boot(s)   [] Foam Boot(s)  [] Roll About    [] Cast Boot [] CROW Boot  [] Other:     Dietary:  [] Diet as tolerated: [x] Calorie Diabetic Diet: [x] No Added Salt:  [x] Increase Protein: [] Other:   Activity:  [x] Activity as tolerated:  [] Patient has no activity restrictions     [] Strict Bedrest: [] Remain off Work:     [] May return to full duty work:                                   [] Return to work with restrictions:             Return Appointment:  [] Wound and dressing supply provider:   [] ECF or Home Healthcare:  [] Wound Assessment: [] Physician or NP scheduled for Wound Assessment:   [x] Return Appointment: With [facility]  in  1 Week(s)  [] Ordered tests:      Electronically signed Lyssa Malloy RN on 11/2/2021      [address] [place] [address] Road Information: Should you experience any significant changes in your wound(s) or have questions about your wound care, please contact the [address] [place] [address] at [address] [place] [address] 8:00 am - 4:30.   If you need help with your wound outside these hours and cannot wait until we are again available, contact your PCP or go to the hospital emergency room. PLEASE NOTE: IF YOU ARE UNABLE TO OBTAIN WOUND SUPPLIES, CONTINUE TO USE THE SUPPLIES YOU HAVE AVAILABLE UNTIL YOU ARE ABLE TO REACH US. IT IS MOST IMPORTANT TO KEEP THE WOUND COVERED AT ALL TIMES.      Physician Signature:_______________________    Date: ___________ Time:  ____________

## 2021-11-09 ENCOUNTER — HOSPITAL ENCOUNTER (OUTPATIENT)
Dept: WOUND CARE | Age: 61
Discharge: HOME | End: 2021-11-09
Attending: PODIATRIST
Payer: COMMERCIAL

## 2021-11-09 DIAGNOSIS — L97.523 NON-HEALING ULCER OF LEFT FOOT WITH NECROSIS OF MUSCLE (HCC): Primary | ICD-10-CM

## 2021-11-09 PROCEDURE — 11042 DBRDMT SUBQ TIS 1ST 20SQCM/<: CPT

## 2021-11-09 RX ORDER — LIDOCAINE HYDROCHLORIDE 20 MG/ML
JELLY TOPICAL ONCE
Status: CANCELLED | OUTPATIENT
Start: 2021-11-09 | End: 2021-11-09

## 2021-11-09 RX ORDER — LIDOCAINE HYDROCHLORIDE 20 MG/ML
JELLY TOPICAL ONCE
Status: COMPLETED | OUTPATIENT
Start: 2021-11-09 | End: 2021-11-09

## 2021-11-09 RX ADMIN — LIDOCAINE HYDROCHLORIDE: 20 JELLY TOPICAL at 18:00

## 2021-11-09 NOTE — PROGRESS NOTES
Debridement Wound Care        Problem List Items Addressed This Visit     None          No orders of the defined types were placed in this encounter. Procedure Note  Indications:  Based on my examination of this patient's wound(s)/ulcer(s) today, debridement is required to promote healing and evaluate the wound base.     Performed by: Ihsan Robles DPM    Consent obtained: Yes    Time out taken: Yes    Debridement: Excisional    Using  Curette the wound(s)/ulcer(s) was/were sharply debrided down through and including the removal of    epidermis, dermis and subcutaneous tissue    Devitalized Tissue Debrided: biofilm, slough and callus    Pre Debridement Measurements:  Are located in the Wound/Ulcer Documentation Flow Sheet    Wound/Ulcer #: 1    Post Debridement Measurements:  Wound/Ulcer Descriptions are Pre Debridement except measurements: Diabetic ulcer, fat layer exposed    Wound Foot Plantar;Left (Active)   Wound Image    11/02/21 0920   Wound Etiology Diabetic 11/02/21 0920   Dressing Status Breakthrough drainage noted 11/02/21 0920   Cleansed Wound cleanser 11/02/21 0920   Dressing/Treatment Collagen 11/02/21 0920   Offloading for Diabetic Foot Ulcers Felt or foam 11/02/21 0920   Dressing Change Due 11/09/21 11/02/21 0920   Wound Length (cm) 0.5 cm 11/02/21 0920   Wound Width (cm) 1 cm 11/02/21 0920   Wound Depth (cm) 0.2 cm 11/02/21 0920   Wound Surface Area (cm^2) 0.5 cm^2 11/02/21 0920   Change in Wound Size % 98.61 11/02/21 0920   Wound Volume (cm^3) 0.1 cm^3 11/02/21 0920   Wound Healing % 99 11/02/21 0920   Post-Procedure Length (cm) 0.7 cm 11/02/21 0920   Post-Procedure Width (cm) 1.2 cm 11/02/21 0920   Post-Procedure Depth (cm) 0.3 cm 11/02/21 0920   Post-Procedure Surface Area (cm^2) 0.84 cm^2 11/02/21 0920   Post-Procedure Volume (cm^3) 0.252 cm^3 11/02/21 0920   Wound Assessment Devitalized tissue; Pink/red 11/02/21 0920   Drainage Amount Small 11/02/21 0920   Drainage Description Serosanguinous 11/02/21 0920   Wound Odor None 11/02/21 0920   Tamika-Wound/Incision Assessment Hyperkeratosis (Callous) 11/02/21 0920   Edges Attached edges 11/02/21 0920   Wound Thickness Description Full thickness 10/26/21 0901   Number of days: 98        Percent of Wound(s)/Ulcer(s) Debrided: 100%    Total Surface Area Debrided:  Approx 2 sq cm See RN's note    Diabetic/Pressure/Non Pressure Ulcers only: Diabetic ulcer, fat layer exposed  Ulcer:     Estimated Blood Loss:  Minimal     Hemostasis Achieved: Pressure    Procedural Pain: 0 / 10     Post Procedural Pain: 0 / 10     Response to treatment: Well tolerated by patient

## 2021-11-12 VITALS
HEART RATE: 80 BPM | TEMPERATURE: 98.3 F | OXYGEN SATURATION: 100 % | SYSTOLIC BLOOD PRESSURE: 156 MMHG | DIASTOLIC BLOOD PRESSURE: 84 MMHG | RESPIRATION RATE: 18 BRPM

## 2021-11-12 NOTE — WOUND CARE
11/09/21 1430   Wound Foot Plantar;Left   Date First Assessed/Time First Assessed: 08/03/21 1457   Present on Hospital Admission: Yes  Primary Wound Type: Diabetic Ulcer  Location: Foot  Wound Location Orientation: Plantar;Left   Wound Image     Wound Etiology Diabetic   Dressing Status Breakthrough drainage noted   Cleansed Wound cleanser   Dressing/Treatment Collagen   Offloading for Diabetic Foot Ulcers Felt or foam   Dressing Change Due 11/16/21   Wound Length (cm) 0.3 cm   Wound Width (cm) 0.5 cm   Wound Depth (cm) 0.1 cm   Wound Surface Area (cm^2) 0.15 cm^2   Change in Wound Size % 99.58   Wound Volume (cm^3) 0.015 cm^3   Wound Healing % 100   Post-Procedure Length (cm) 0.5 cm   Post-Procedure Width (cm) 1 cm   Post-Procedure Depth (cm) 0.2 cm   Post-Procedure Surface Area (cm^2) 0.5 cm^2   Post-Procedure Volume (cm^3) 0.1 cm^3   Wound Assessment Devitalized tissue   Drainage Amount Scant   Drainage Description Serosanguinous   Wound Odor None   Tamika-Wound/Incision Assessment Hyperkeratosis (Callous)   Edges Epibole (rolled edges)     Applied collagen, meptiel one, alginate, felt offload,  exudry and 2 layer compression    Multilayer Compression Wrap   (Not Unna) Below the Knee    NAME:  Mackenzie Balbuena  YOB: 1960  MEDICAL RECORD NUMBER:  [id number]  DATE:  11/9/2021    Removed old Multilayer wrap if indicated and wash leg with mild soap/water. Applied moisturizing agent to dry skin as needed. Applied primary and secondary dressing as ordered. Applied multilayered dressing below the knee to left lower leg. Instructed patient/caregiver not to remove dressing and to keep it clean and dry. Instructed patient/caregiver on complications to report to provider, such as pain, numbness in toes, heavy drainage, and slippage of dressing. Instructed patient on purpose of compression dressing and on activity and exercise recommendations.     Response to treatment: Well tolerated by patient Electronically signed by Haja Brown RN

## 2021-11-12 NOTE — DISCHARGE INSTRUCTIONS
Discharge Instructions for  Tim Chavez  [address] [place], [address] [facility]  [phone] Fax [phone]    NAME:  Ronaldo Starr  YOB: 1960  MEDICAL RECORD NUMBER:  [id number]  DATE:  11/9/2021       Compression:  Apply: [x] Multilayer Compression Wrap Applied in Clinic []RightLeg [x]Left Leg   [x] Multi-layer compression. Do not get leg(s) with wrap wet. If wraps become too tight call the center or completely remove the wrap. [x] Elevate leg(s) above the level of the heart when sitting. [x] Avoid prolonged standing in one place. Dietary:  [] Diet as tolerated: [x] Calorie Diabetic Diet: [x] No Added Salt:  [x] Increase Protein: [] Other:   Activity:  [x] Activity as tolerated:  [] Patient has no activity restrictions     [] Strict Bedrest: [] Remain off Work:     [] May return to full duty work:                                   [] Return to work with restrictions:             Return Appointment:  [] Wound and dressing supply provider:   [] ECF or Home Healthcare:  [] Wound Assessment: [] Physician or NP scheduled for Wound Assessment:   [x] Return Appointment: With [facility]  in  1 Week(s)  [] Ordered tests:      Electronically signed Zaria Choe, [address] [place] [address] Information: Should you experience any significant changes in your wound(s) or have questions about your wound care, please contact the [address] [place] [address] at [address] [place] [address] 8:00 am - 4:30. If you need help with your wound outside these hours and cannot wait until we are again available, contact your PCP or go to the hospital emergency room. PLEASE NOTE: IF YOU ARE UNABLE TO OBTAIN WOUND SUPPLIES, CONTINUE TO USE THE SUPPLIES YOU HAVE AVAILABLE UNTIL YOU ARE ABLE TO REACH US. IT IS MOST IMPORTANT TO KEEP THE WOUND COVERED AT ALL TIMES.      Physician Signature:_______________________    Date: ___________ Time:  ____________

## 2021-11-16 ENCOUNTER — HOSPITAL ENCOUNTER (OUTPATIENT)
Dept: WOUND CARE | Age: 61
Discharge: HOME | End: 2021-11-16
Attending: PODIATRIST
Payer: COMMERCIAL

## 2021-11-16 DIAGNOSIS — L97.523 NON-HEALING ULCER OF LEFT FOOT WITH NECROSIS OF MUSCLE (HCC): Primary | ICD-10-CM

## 2021-11-16 PROCEDURE — 11042 DBRDMT SUBQ TIS 1ST 20SQCM/<: CPT

## 2021-11-16 RX ORDER — LIDOCAINE HYDROCHLORIDE 20 MG/ML
JELLY TOPICAL ONCE
Status: CANCELLED | OUTPATIENT
Start: 2021-11-16 | End: 2021-11-16

## 2021-11-17 NOTE — PROGRESS NOTES
Debridement Wound Care        Problem List Items Addressed This Visit        Other    Non-healing ulcer of left foot ([address] - Primary    Relevant Orders    INITIATE OUTPATIENT WOUND CARE PROTOCOL          No orders of the defined types were placed in this encounter. Procedure Note  Indications:  Based on my examination of this patient's wound(s)/ulcer(s) today, debridement is required to promote healing and evaluate the wound base.     Performed by: Kirit Kumar DPM    Consent obtained: Yes    Time out taken: Yes    Debridement: Excisional    Using  #10 blade the wound(s)/ulcer(s) was/were sharply debrided down through and including the removal of    epidermis, dermis and subcutaneous tissue    Devitalized Tissue Debrided: fibrin, biofilm and callus    Pre Debridement Measurements:  Are located in the [facility]  Documentation Flow Sheet    Wound/Ulcer #: 1    Post Debridement Measurements:  Wound/Ulcer Descriptions are Pre Debridement except measurements: Diabetic ulcer, fat layer exposed    Wound Foot Plantar;Left (Active)   Wound Image    11/09/21 1430   Wound Etiology Diabetic 11/09/21 1430   Dressing Status Breakthrough drainage noted 11/09/21 1430   Cleansed Wound cleanser 11/09/21 1430   Dressing/Treatment Collagen 11/09/21 1430   Offloading for Diabetic Foot Ulcers Felt or foam 11/09/21 1430   Dressing Change Due 11/16/21 11/09/21 1430   Wound Length (cm) 0.3 cm 11/09/21 1430   Wound Width (cm) 0.5 cm 11/09/21 1430   Wound Depth (cm) 0.1 cm 11/09/21 1430   Wound Surface Area (cm^2) 0.15 cm^2 11/09/21 1430   Change in Wound Size % 99.58 11/09/21 1430   Wound Volume (cm^3) 0.015 cm^3 11/09/21 1430   Wound Healing % 100 11/09/21 1430   Post-Procedure Length (cm) 0.5 cm 11/09/21 1430   Post-Procedure Width (cm) 1 cm 11/09/21 1430   Post-Procedure Depth (cm) 0.2 cm 11/09/21 1430   Post-Procedure Surface Area (cm^2) 0.5 cm^2 11/09/21 1430   Post-Procedure Volume (cm^3) 0.1 cm^3 11/09/21 1430   Wound Assessment Devitalized tissue 11/09/21 1430   Drainage Amount Scant 11/09/21 1430   Drainage Description Serosanguinous 11/09/21 1430   Wound Odor None 11/09/21 1430   Tamika-Wound/Incision Assessment Hyperkeratosis (Callous) 11/09/21 1430   Edges Epibole (rolled edges) 11/09/21 1430   Wound Thickness Description Full thickness 10/26/21 0901   Number of days: 106        Percent of Wound(s)/Ulcer(s) Debrided: 100%    Total Surface Area Debrided:  Approx 2 sq cm See RN's note    Diabetic/Pressure/Non Pressure Ulcers only: Diabetic ulcer, fat layer exposed  Ulcer:     Estimated Blood Loss:  Minimal     Hemostasis Achieved: Pressure    Procedural Pain: 0 / 10     Post Procedural Pain: 0 / 10     Response to treatment: Well tolerated by patient

## 2021-11-29 VITALS
HEART RATE: 82 BPM | RESPIRATION RATE: 18 BRPM | OXYGEN SATURATION: 100 % | DIASTOLIC BLOOD PRESSURE: 75 MMHG | TEMPERATURE: 98.4 F | SYSTOLIC BLOOD PRESSURE: 160 MMHG

## 2021-11-29 NOTE — DISCHARGE INSTRUCTIONS
Discharge Instructions for  Tim Chavez  [address] [place], [place], [address] [place] [address]  [phone] Fax [phone]    NAME:  Mireille Reddy  YOB: 1960  MEDICAL RECORD NUMBER:  [id number]  DATE:  11/16/2021        Off-Loading:   [x] Off-loading when [] walking  [] in bed [] sitting    Dietary:  [] Diet as tolerated: [x] Calorie Diabetic Diet: [x] No Added Salt:  [x] Increase Protein: [] Other:   Activity:  [x] Activity as tolerated:  [] Patient has no activity restrictions     [] Strict Bedrest: [] Remain off Work:     [] May return to full duty work:                                   [] Return to work with restrictions:             Return Appointment:  [] Wound and dressing supply provider:   [] ECF or Home Healthcare:  [] Wound Assessment: [] Physician NP scheduled for Wound Assessment:   [] Return Appointment: With [facility]  in  [facility]  [] Ordered tests:      Electronically signed Lito Yepez 167 Information: Should you experience any significant changes in your wound(s) or have questions about your wound care, please contact the [facility] at [address] [place] [address] 8:00 am - 4:30. If you need help with your wound outside these hours and cannot wait until we are again available, contact your PCP or go to the hospital emergency room. PLEASE NOTE: IF YOU ARE UNABLE TO OBTAIN WOUND SUPPLIES, CONTINUE TO USE THE SUPPLIES YOU HAVE AVAILABLE UNTIL YOU ARE ABLE TO REACH US. IT IS MOST IMPORTANT TO KEEP THE WOUND COVERED AT ALL TIMES.      Physician Signature:_______________________    Date: ___________ Time:  ____________

## 2021-11-29 NOTE — WOUND CARE
11/16/21 1409   Wound Foot Plantar;Left   Date First Assessed/Time First Assessed: 08/03/21 1457   Present on Hospital Admission: Yes  Primary Wound Type: Diabetic Ulcer  Location: Foot  Wound Location Orientation: Plantar;Left   Wound Image     Wound Etiology Diabetic   Dressing Status Intact; Breakthrough drainage noted   Cleansed Wound cleanser   Dressing/Treatment Collagen   Offloading for Diabetic Foot Ulcers Felt or foam   Dressing Change Due 11/30/21   Wound Length (cm) 0.2 cm   Wound Width (cm) 0.2 cm   Wound Depth (cm) 0.1 cm   Wound Surface Area (cm^2) 0.04 cm^2   Change in Wound Size % 99.89   Wound Volume (cm^3) 0.004 cm^3   Wound Healing % 100   Post-Procedure Length (cm) 0.3 cm   Post-Procedure Width (cm) 0.3 cm   Post-Procedure Depth (cm) 0.2 cm   Post-Procedure Surface Area (cm^2) 0.09 cm^2   Post-Procedure Volume (cm^3) 0.018 cm^3   Wound Assessment Devitalized tissue   Drainage Amount Scant   Drainage Description Serous   Wound Odor None   Tamika-Wound/Incision Assessment Hyperkeratosis (Callous)   Edges Epibole (rolled edges)     Applied gent violet, alginate AG and felt offload

## 2021-11-30 ENCOUNTER — HOSPITAL ENCOUNTER (OUTPATIENT)
Dept: WOUND CARE | Age: 61
Discharge: HOME | End: 2021-11-30
Attending: PODIATRIST
Payer: COMMERCIAL

## 2021-11-30 VITALS
RESPIRATION RATE: 18 BRPM | OXYGEN SATURATION: 100 % | SYSTOLIC BLOOD PRESSURE: 143 MMHG | TEMPERATURE: 98.5 F | DIASTOLIC BLOOD PRESSURE: 72 MMHG | HEART RATE: 73 BPM

## 2021-11-30 DIAGNOSIS — L97.523 NON-HEALING ULCER OF LEFT FOOT WITH NECROSIS OF MUSCLE (HCC): Primary | ICD-10-CM

## 2021-11-30 PROCEDURE — 99212 OFFICE O/P EST SF 10 MIN: CPT

## 2021-11-30 RX ORDER — LIDOCAINE HYDROCHLORIDE 20 MG/ML
JELLY TOPICAL ONCE
Status: COMPLETED | OUTPATIENT
Start: 2021-11-30 | End: 2021-11-30

## 2021-11-30 RX ORDER — LIDOCAINE HYDROCHLORIDE 20 MG/ML
JELLY TOPICAL ONCE
Status: CANCELLED | OUTPATIENT
Start: 2021-11-30 | End: 2021-11-30

## 2021-11-30 RX ADMIN — LIDOCAINE HYDROCHLORIDE: 20 JELLY TOPICAL at 14:13

## 2021-11-30 NOTE — PROGRESS NOTES
Debridement Wound Care        Problem List Items Addressed This Visit        Other    Non-healing ulcer of left foot (HCC) - Primary    Relevant Medications    lidocaine (URO-JET/ GLYDO) 2 % jelly (Completed)    Other Relevant Orders    INITIATE OUTPATIENT WOUND CARE PROTOCOL          Medications Ordered Today   Medications    lidocaine (URO-JET/ GLYDO) 2 % jelly        Procedure Note  Indications:  Based on my examination of this patient's wound(s)/ulcer(s) today, debridement is required to promote healing and evaluate the wound base.     Performed by: Laura Olvera DPM    Consent obtained: Yes    Time out taken: Yes    Debridement: Excisional    Using  #10 blade the wound(s)/ulcer(s) was/were sharply debrided down through and including the removal of    epidermis    Devitalized Tissue Debrided: callus    Pre Debridement Measurements:  Are located in the Wound/Ulcer Documentation Flow Sheet    Wound/Ulcer #: 1    Post Debridement Measurements:  Wound/Ulcer Descriptions are Pre Debridement except measurements: Diabetic ulcer, limited to breakdown of skin    Wound Foot Plantar;Left (Active)   Wound Image    11/16/21 1409   Wound Etiology Diabetic 11/16/21 1409   Dressing Status Intact; Breakthrough drainage noted 11/16/21 1409   Cleansed Wound cleanser 11/16/21 1409   Dressing/Treatment Collagen 11/16/21 1409   Offloading for Diabetic Foot Ulcers Felt or foam 11/16/21 1409   Dressing Change Due 11/30/21 11/16/21 1409   Wound Length (cm) 0.2 cm 11/16/21 1409   Wound Width (cm) 0.2 cm 11/16/21 1409   Wound Depth (cm) 0.1 cm 11/16/21 1409   Wound Surface Area (cm^2) 0.04 cm^2 11/16/21 1409   Change in Wound Size % 99.89 11/16/21 1409   Wound Volume (cm^3) 0.004 cm^3 11/16/21 1409   Wound Healing % 100 11/16/21 1409   Post-Procedure Length (cm) 0.3 cm 11/16/21 1409   Post-Procedure Width (cm) 0.3 cm 11/16/21 1409   Post-Procedure Depth (cm) 0.2 cm 11/16/21 1409   Post-Procedure Surface Area (cm^2) 0.09 cm^2 11/16/21 1409   Post-Procedure Volume (cm^3) 0.018 cm^3 11/16/21 1409   Wound Assessment Devitalized tissue 11/16/21 1409   Drainage Amount Scant 11/16/21 1409   Drainage Description Serous 11/16/21 1409   Wound Odor None 11/16/21 1409   Tamika-Wound/Incision Assessment Hyperkeratosis (Callous) 11/16/21 1409   Edges Epibole (rolled edges) 11/16/21 1409   Wound Thickness Description Full thickness 10/26/21 0901   Number of days: 119        Percent of Wound(s)/Ulcer(s) Debrided: 100%    Total Surface Area Debrided:  Approx 3 sq cm See RN's note    Diabetic/Pressure/Non Pressure Ulcers only: Diabetic ulcer, limited to breakdown of skin  Ulcer:     Estimated Blood Loss:  Minimal     Hemostasis Achieved: Pressure    Procedural Pain: 0 / 10     Post Procedural Pain: 0 / 10     Response to treatment: Well tolerated by patient    Will discharge patient from the center can follow up with his own podiatrist for regular callus care if gets worse told to return to office asap

## 2021-12-03 NOTE — WOUND CARE
11/30/21 1415   Wound Foot Plantar;Left   Date First Assessed/Time First Assessed: 08/03/21 1457   Present on Hospital Admission: Yes  Primary Wound Type: Diabetic Ulcer  Location: Foot  Wound Location Orientation: Plantar;Left   Wound Image     Wound Etiology Diabetic   Dressing Status Intact; Breakthrough drainage noted;  Old drainage noted   Cleansed Cleansed with saline   Dressing/Treatment Collagen   Offloading for Diabetic Foot Ulcers Felt or foam   Wound Length (cm) 0.2 cm   Wound Width (cm) 0.2 cm   Wound Depth (cm) 0.1 cm   Wound Surface Area (cm^2) 0.04 cm^2   Change in Wound Size % 99.89   Wound Volume (cm^3) 0.004 cm^3   Wound Healing % 100   Post-Procedure Length (cm) 0.2 cm   Post-Procedure Width (cm) 0.2 cm   Post-Procedure Depth (cm) 0.2 cm   Post-Procedure Surface Area (cm^2) 0.04 cm^2   Post-Procedure Volume (cm^3) 0.008 cm^3   Wound Assessment Devitalized tissue   Drainage Amount Scant   Drainage Description Serous   Wound Odor None   Tamika-Wound/Incision Assessment Hyperkeratosis (Callous)   Edges Epibole (rolled edges)   Wound Thickness Description Full thickness

## 2022-01-04 ENCOUNTER — HOSPITAL ENCOUNTER (OUTPATIENT)
Dept: WOUND CARE | Age: 62
Discharge: HOME | End: 2022-01-04
Attending: PODIATRIST
Payer: COMMERCIAL

## 2022-01-04 VITALS
SYSTOLIC BLOOD PRESSURE: 166 MMHG | TEMPERATURE: 97.8 F | OXYGEN SATURATION: 97 % | RESPIRATION RATE: 18 BRPM | HEART RATE: 80 BPM | DIASTOLIC BLOOD PRESSURE: 73 MMHG

## 2022-01-04 DIAGNOSIS — L97.523 NON-HEALING ULCER OF LEFT FOOT WITH NECROSIS OF MUSCLE (HCC): Primary | ICD-10-CM

## 2022-01-04 PROCEDURE — 11042 DBRDMT SUBQ TIS 1ST 20SQCM/<: CPT

## 2022-01-04 RX ORDER — LIDOCAINE HYDROCHLORIDE 20 MG/ML
JELLY TOPICAL ONCE
Status: COMPLETED | OUTPATIENT
Start: 2022-01-04 | End: 2022-01-04

## 2022-01-04 RX ORDER — LIDOCAINE HYDROCHLORIDE 20 MG/ML
JELLY TOPICAL ONCE
Status: CANCELLED | OUTPATIENT
Start: 2022-01-04 | End: 2022-01-04

## 2022-01-04 RX ADMIN — LIDOCAINE HYDROCHLORIDE: 20 JELLY TOPICAL at 14:30

## 2022-01-04 NOTE — WOUND CARE
01/04/22 1444   Wound Foot Plantar;Left   Date First Assessed/Time First Assessed: 08/03/21 1457   Present on Hospital Admission: Yes  Primary Wound Type: Diabetic Ulcer  Location: Foot  Wound Location Orientation: Plantar;Left   Wound Image     Wound Etiology Diabetic   Dressing Status Breakthrough drainage noted   Cleansed Wound cleanser   Dressing/Treatment Collagen with Ag   Offloading for Diabetic Foot Ulcers Felt or foam   Dressing Change Due 01/11/22   Wound Length (cm) 1 cm   Wound Width (cm) 0.7 cm   Wound Depth (cm) 0.5 cm   Wound Surface Area (cm^2) 0.7 cm^2   Change in Wound Size % 98.06   Wound Volume (cm^3) 0.35 cm^3   Wound Healing % 97   Post-Procedure Length (cm) 1.3 cm   Post-Procedure Width (cm) 1 cm   Post-Procedure Depth (cm) 0.3 cm   Post-Procedure Surface Area (cm^2) 1.3 cm^2   Post-Procedure Volume (cm^3) 0.39 cm^3   Wound Assessment Devitalized tissue; Epithelialization   Drainage Amount Small   Drainage Description Serosanguinous   Wound Odor None   Tamika-Wound/Incision Assessment Hyperkeratosis (Callous)   Edges Unattached edges   Wound Thickness Description Full thickness     Post debridement Picture:

## 2022-01-04 NOTE — DISCHARGE INSTRUCTIONS
Discharge Instructions for  [address] Chavez  [address] [place], [place], [facility]  [phone] Fax [phone]    NAME:  Kiki Kolb  YOB: 1960  MEDICAL RECORD NUMBER:  [id number]  DATE:  1/4/2022    Wound Cleansing:   Do not scrub or use excessive force. Cleanse wound prior to applying a clean dressing with:  [] Normal Saline [] Keep Wound Dry in Shower    [] Wound Cleanser   [] Cleanse wound with Mild Soap & Water  [] May Shower at Discharge   [] Other:      Topical Treatments:  Do not apply lotions, creams, or ointments to wound bed unless directed. [] Apply moisturizing lotion to skin surrounding the wound prior to dressing change.  [] Apply antifungal ointment to skin surrounding the wound prior to dressing change.  [] Apply thin film of moisture barrier ointment to skin immediately around wound. [] Other:       Dressings:           Wound Location - Left foot  [] Apply Primary Dressing:       [] MediHoney Gel [] Alginate with Silver [] Alginate   [] Collagen [] Collagen with Silver   [] Santyl with Moisten saline gauze     [] Hydrocolloid   [] MediHoney Alginate [] Foam with Silver   [] Foam   [] Hydrofera Blue    [] Mepilex Border    [] Moisten with Saline [] Hydrogel [] Mepitel     [] Bactroban/Mupirocin [] Polysporin  [] Other:    [] Pack wound loosely with  [] Iodoform   [] Plain Packing  [] Other   [] Cover and Secure with:     [] Gauze [] Leonidas [] Kerlix   [] Ace Wrap [] Cover Roll Tape [] ABD     [] Other:    Avoid contact of tape with skin.   [] Change dressing: [] Daily    [] Every Other Day [] Three times per week   [] Once a week [x] Do Not Change Dressing   [] Other:               Off-Loading:   [] Off-loading when [x] walking  [] in bed [] sitting  [] Total non-weight bearing  [] Right Leg  [] Left Leg   [] Assistive Device [] Walker [] Cane  [] Wheelchair  [] Crutches   [] Surgical shoe    [] Podus Boot(s)   [] Foam Boot(s)  [] Roll About    [] Cast Boot [] CROW Boot  [x] Other:       Dietary:  [x] Diet as tolerated: [] Calorie Diabetic Diet: [] No Added Salt:  [] Increase Protein: [] Other:       Activity:  [x] Activity as tolerated:  [] Patient has no activity restrictions     [] Strict Bedrest: [] Remain off Work:     [] May return to full duty work:                                   [] Return to work with restrictions:             Return Appointment:  [] Wound and dressing supply provider:   [] ECF or Home Healthcare:  [] Wound Assessment: [] Physician or NP scheduled for Wound Assessment:   [x] Return Appointment: With Dr. Andra Bailey  in  [facility]  [] Ordered tests:      Electronically signed Pravin Daily RN on 1/4/2022 at 2:52 PM     Lexi Patrick 281: Should you experience any significant changes in your wound(s) or have questions about your wound care, please contact the [address] [place] [address] at [address] [place] [address] 8:00 am - 4:30. If you need help with your wound outside these hours and cannot wait until we are again available, contact your PCP or go to the hospital emergency room. PLEASE NOTE: IF YOU ARE UNABLE TO OBTAIN WOUND SUPPLIES, CONTINUE TO USE THE SUPPLIES YOU HAVE AVAILABLE UNTIL YOU ARE ABLE TO REACH US. IT IS MOST IMPORTANT TO KEEP THE WOUND COVERED AT ALL TIMES.      Physician Signature:_______________________    Date: ___________ Time:  ____________

## 2022-01-04 NOTE — PROGRESS NOTES
Debridement Wound Care        Problem List Items Addressed This Visit        Other    Non-healing ulcer of left foot (HCC) - Primary    Relevant Medications    lidocaine (URO-JET/ GLYDO) 2 % jelly (Completed)    Other Relevant Orders    INITIATE OUTPATIENT WOUND CARE PROTOCOL          Medications Ordered Today   Medications    lidocaine (URO-JET/ GLYDO) 2 % jelly      Recurrent diabetic ulceration left foot plantar, received diabetic shoes and has been wearing them was unable to see a podiatrist since they was no appointment available till February   Procedure Note  Indications:  Based on my examination of this patient's wound(s)/ulcer(s) today, debridement is required to promote healing and evaluate the wound base.     Performed by: Phillip Fritz DPM    Consent obtained: Yes    Time out taken: Yes    Debridement: Excisional    Using  #10 blade and Curette the wound(s)/ulcer(s) was/were sharply debrided down through and including the removal of    epidermis, dermis and subcutaneous tissue    Devitalized Tissue Debrided: fibrin, biofilm, slough and callus    Pre Debridement Measurements:  Are located in the [facility]  Documentation Flow Sheet    Wound/Ulcer #: 1    Post Debridement Measurements:  Wound/Ulcer Descriptions are Pre Debridement except measurements: Diabetic ulcer, fat layer exposed    Wound Foot Plantar;Left (Active)   Wound Image    01/04/22 1444   Wound Etiology Diabetic 01/04/22 1444   Dressing Status Breakthrough drainage noted 01/04/22 1444   Cleansed Wound cleanser 01/04/22 1444   Dressing/Treatment Collagen with Ag 01/04/22 1444   Offloading for Diabetic Foot Ulcers Felt or foam 01/04/22 1444   Dressing Change Due 01/11/22 01/04/22 1444   Wound Length (cm) 1 cm 01/04/22 1444   Wound Width (cm) 0.7 cm 01/04/22 1444   Wound Depth (cm) 0.5 cm 01/04/22 1444   Wound Surface Area (cm^2) 0.7 cm^2 01/04/22 1444   Change in Wound Size % 98.06 01/04/22 1444   Wound Volume (cm^3) 0.35 cm^3 01/04/22 1444   Wound Healing % 97 01/04/22 1444   Post-Procedure Length (cm) 1.3 cm 01/04/22 1444   Post-Procedure Width (cm) 1 cm 01/04/22 1444   Post-Procedure Depth (cm) 0.3 cm 01/04/22 1444   Post-Procedure Surface Area (cm^2) 1.3 cm^2 01/04/22 1444   Post-Procedure Volume (cm^3) 0.39 cm^3 01/04/22 1444   Wound Assessment Devitalized tissue; Epithelialization 01/04/22 1444   Drainage Amount Small 01/04/22 1444   Drainage Description Serosanguinous 01/04/22 1444   Wound Odor None 01/04/22 1444   Tamika-Wound/Incision Assessment Hyperkeratosis (Callous) 01/04/22 1444   Edges Unattached edges 01/04/22 1444   Wound Thickness Description Full thickness 01/04/22 1444   Number of days: 154        Percent of Wound(s)/Ulcer(s) Debrided: 100%    Total Surface Area Debrided:  Approx 4 sq cm See RN's note    Diabetic/Pressure/Non Pressure Ulcers only: Diabetic ulcer, fat layer exposed  Ulcer:     Estimated Blood Loss:  Minimal     Hemostasis Achieved: Pressure    Procedural Pain: 0 / 10     Post Procedural Pain: 0 / 10     Response to treatment: Well tolerated by patient    Continue diabetic shoes

## 2022-01-11 ENCOUNTER — HOSPITAL ENCOUNTER (OUTPATIENT)
Dept: WOUND CARE | Age: 62
Discharge: HOME | End: 2022-01-11
Attending: PODIATRIST
Payer: COMMERCIAL

## 2022-01-11 DIAGNOSIS — L97.523 NON-HEALING ULCER OF LEFT FOOT WITH NECROSIS OF MUSCLE (HCC): Primary | ICD-10-CM

## 2022-01-11 PROCEDURE — 11042 DBRDMT SUBQ TIS 1ST 20SQCM/<: CPT

## 2022-01-11 RX ORDER — LIDOCAINE HYDROCHLORIDE 20 MG/ML
JELLY TOPICAL ONCE
Status: CANCELLED | OUTPATIENT
Start: 2022-01-11 | End: 2022-01-11

## 2022-01-11 RX ORDER — LIDOCAINE HYDROCHLORIDE 20 MG/ML
JELLY TOPICAL ONCE
Status: COMPLETED | OUTPATIENT
Start: 2022-01-11 | End: 2022-01-11

## 2022-01-11 RX ADMIN — LIDOCAINE HYDROCHLORIDE: 20 JELLY TOPICAL at 18:00

## 2022-01-11 NOTE — PROGRESS NOTES
Debridement Wound Care        Problem List Items Addressed This Visit     None          No orders of the defined types were placed in this encounter. Procedure Note  Indications:  Based on my examination of this patient's wound(s)/ulcer(s) today, debridement is required to promote healing and evaluate the wound base. Performed by: Joe Watkins DPM    Consent obtained: Yes    Time out taken: Yes    Debridement: Excisional    Using  Curette and #10 blade the wound(s)/ulcer(s) was/were sharply debrided down through and including the removal of    epidermis, dermis and subcutaneous tissue    Devitalized Tissue Debrided: fibrin, biofilm, slough and callus    Pre Debridement Measurements:  Are located in the [place]  Documentation Flow Sheet    Wound/Ulcer #: 1    Post Debridement Measurements:  Wound/Ulcer Descriptions are Pre Debridement except measurements: Diabetic ulcer, fat layer exposed    Wound Foot Plantar;Left (Active)   Wound Image    01/04/22 1444   Wound Etiology Diabetic 01/04/22 1444   Dressing Status Breakthrough drainage noted 01/04/22 1444   Cleansed Wound cleanser 01/04/22 1444   Dressing/Treatment Collagen with Ag 01/04/22 1444   Offloading for Diabetic Foot Ulcers Felt or foam 01/04/22 1444   Dressing Change Due 01/11/22 01/04/22 1444   Wound Length (cm) 1 cm 01/04/22 1444   Wound Width (cm) 0.7 cm 01/04/22 1444   Wound Depth (cm) 0.5 cm 01/04/22 1444   Wound Surface Area (cm^2) 0.7 cm^2 01/04/22 1444   Change in Wound Size % 98.06 01/04/22 1444   Wound Volume (cm^3) 0.35 cm^3 01/04/22 1444   Wound Healing % 97 01/04/22 1444   Post-Procedure Length (cm) 1.3 cm 01/04/22 1444   Post-Procedure Width (cm) 1 cm 01/04/22 1444   Post-Procedure Depth (cm) 0.3 cm 01/04/22 1444   Post-Procedure Surface Area (cm^2) 1.3 cm^2 01/04/22 1444   Post-Procedure Volume (cm^3) 0.39 cm^3 01/04/22 1444   Wound Assessment Devitalized tissue; Epithelialization 01/04/22 1444   Drainage Amount Small 01/04/22 1444   Drainage Description Serosanguinous 01/04/22 1444   Wound Odor None 01/04/22 1444   Tamika-Wound/Incision Assessment Hyperkeratosis (Callous) 01/04/22 1444   Edges Unattached edges 01/04/22 1444   Wound Thickness Description Full thickness 01/04/22 1444   Number of days: 161        Percent of Wound(s)/Ulcer(s) Debrided: 100%    Total Surface Area Debrided:  Approx 3 sq cm See RN's note    Diabetic/Pressure/Non Pressure Ulcers only: Diabetic ulcer, fat layer exposed  Ulcer:     Estimated Blood Loss:  Minimal     Hemostasis Achieved: Pressure    Procedural Pain: 0 / 10     Post Procedural Pain: 0 / 10     Response to treatment: Well tolerated by patient

## 2022-01-12 VITALS
SYSTOLIC BLOOD PRESSURE: 159 MMHG | HEART RATE: 92 BPM | RESPIRATION RATE: 18 BRPM | TEMPERATURE: 98.1 F | OXYGEN SATURATION: 99 % | DIASTOLIC BLOOD PRESSURE: 82 MMHG

## 2022-01-12 NOTE — WOUND CARE
01/11/22 0847   Wound Foot Plantar;Left   Date First Assessed/Time First Assessed: 08/03/21 1457   Present on Hospital Admission: Yes  Primary Wound Type: Diabetic Ulcer  Location: Foot  Wound Location Orientation: Plantar;Left   Wound Image    Wound Etiology Diabetic   Dressing Status Intact;Breakthrough drainage noted   Cleansed Wound cleanser   Dressing/Treatment Collagen;Alginate with Ag;Foam;Gauze dressing/dressing sponge;Tape/Soft cloth adhesive tape   Offloading for Diabetic Foot Ulcers Felt or foam;Diabetic shoes/inserts   Dressing Change Due 01/18/22   Wound Length (cm) 1 cm   Wound Width (cm) 1 cm   Wound Depth (cm) 0.6 cm   Wound Surface Area (cm^2) 1 cm^2   Change in Wound Size % 97.22   Wound Volume (cm^3) 0.6 cm^3   Wound Healing % 94   Post-Procedure Length (cm) 1.2 cm   Post-Procedure Width (cm) 1 cm   Post-Procedure Depth (cm) 0.5 cm   Post-Procedure Surface Area (cm^2) 1.2 cm^2   Post-Procedure Volume (cm^3) 0.6 cm^3   Wound Assessment Devitalized tissue; Epithelialization;Pink/red   Drainage Amount Small   Drainage Description Serosanguinous   Wound Odor None   Tamika-Wound/Incision Assessment Hyperkeratosis (Callous)   Edges Attached edges; Defined edges;Epibole (rolled edges)   Wound Thickness Description Full thickness

## 2022-01-18 ENCOUNTER — HOSPITAL ENCOUNTER (OUTPATIENT)
Dept: WOUND CARE | Age: 62
Discharge: HOME | End: 2022-01-18
Attending: PODIATRIST
Payer: COMMERCIAL

## 2022-01-18 VITALS
DIASTOLIC BLOOD PRESSURE: 85 MMHG | HEART RATE: 93 BPM | RESPIRATION RATE: 18 BRPM | OXYGEN SATURATION: 99 % | TEMPERATURE: 98.4 F | SYSTOLIC BLOOD PRESSURE: 160 MMHG

## 2022-01-18 DIAGNOSIS — L97.523 NON-HEALING ULCER OF LEFT FOOT WITH NECROSIS OF MUSCLE (HCC): Primary | ICD-10-CM

## 2022-01-18 PROCEDURE — 11042 DBRDMT SUBQ TIS 1ST 20SQCM/<: CPT

## 2022-01-18 RX ORDER — LIDOCAINE HYDROCHLORIDE 20 MG/ML
JELLY TOPICAL ONCE
Status: CANCELLED | OUTPATIENT
Start: 2022-01-18 | End: 2022-01-18

## 2022-01-18 NOTE — PROGRESS NOTES
Debridement Wound Care        Problem List Items Addressed This Visit        Other    Non-healing ulcer of left foot ([address] - Primary    Relevant Orders    INITIATE OUTPATIENT WOUND CARE PROTOCOL          No orders of the defined types were placed in this encounter. Procedure Note  Indications:  Based on my examination of this patient's wound(s)/ulcer(s) today, debridement is required to promote healing and evaluate the wound base.     Performed by: Rosalio Vang DPM    Consent obtained: Yes    Time out taken: Yes    Debridement: Excisional    Using  Tissue Nippers and #10 blade the wound(s)/ulcer(s) was/were sharply debrided down through and including the removal of    epidermis, dermis, subcutaneous tissue and muscle/fascia    Devitalized Tissue Debrided: fibrin, biofilm, slough and callus    Pre Debridement Measurements:  Are located in the [place]  Documentation Flow Sheet    Wound/Ulcer #: 1    Post Debridement Measurements:  Wound/Ulcer Descriptions are Pre Debridement except measurements: Diabetic ulcer, muscle necrosis    Wound Foot Plantar;Left (Active)   Wound Image   01/18/22 1015   Wound Etiology Diabetic 01/18/22 1015   Dressing Status Intact;Breakthrough drainage noted 01/18/22 1015   Cleansed Wound cleanser 01/18/22 1015   Dressing/Treatment Alginate with Ag;Collagen 01/18/22 1015   Offloading for Diabetic Foot Ulcers Felt or foam 01/18/22 1015   Dressing Change Due 01/25/22 01/18/22 1015   Wound Length (cm) 1 cm 01/18/22 1015   Wound Width (cm) 0.8 cm 01/18/22 1015   Wound Depth (cm) 0.8 cm 01/18/22 1015   Wound Surface Area (cm^2) 0.8 cm^2 01/18/22 1015   Change in Wound Size % 97.78 01/18/22 1015   Wound Volume (cm^3) 0.64 cm^3 01/18/22 1015   Wound Healing % 94 01/18/22 1015   Post-Procedure Length (cm) 1.2 cm 01/11/22 0847   Post-Procedure Width (cm) 1 cm 01/11/22 0847   Post-Procedure Depth (cm) 0.5 cm 01/11/22 0847   Post-Procedure Surface Area (cm^2) 1.2 cm^2 01/11/22 0847 Post-Procedure Volume (cm^3) 0.6 cm^3 01/11/22 0847   Wound Assessment Devitalized tissue 01/18/22 1015   Drainage Amount Small 01/18/22 1015   Drainage Description Serosanguinous 01/18/22 1015   Wound Odor None 01/18/22 1015   Tamika-Wound/Incision Assessment Hyperkeratosis (Callous) 01/18/22 1015   Edges Defined edges 01/18/22 1015   Wound Thickness Description Full thickness 01/11/22 0847   Number of days: 168        Percent of Wound(s)/Ulcer(s) Debrided: 100%    Total Surface Area Debrided:  Approx 4 sq cm See RN's note    Diabetic/Pressure/Non Pressure Ulcers only: Diabetic ulcer, muscle necrosis  Ulcer:     Estimated Blood Loss:  Estimated amt of blood loss is 10 ml    Hemostasis Achieved: Pressure    Procedural Pain: 0 / 10     Post Procedural Pain: 0 / 10     Response to treatment: Well tolerated by patient

## 2022-01-19 NOTE — WOUND CARE
01/18/22 1015   Wound Foot Plantar;Left   Date First Assessed/Time First Assessed: 08/03/21 1457   Present on Hospital Admission: Yes  Primary Wound Type: Diabetic Ulcer  Location: Foot  Wound Location Orientation: Plantar;Left   Wound Image     Wound Etiology Diabetic   Dressing Status Intact;Breakthrough drainage noted   Cleansed Wound cleanser   Dressing/Treatment Alginate with Ag;Collagen   Offloading for Diabetic Foot Ulcers Felt or foam   Dressing Change Due 01/25/22   Wound Length (cm) 1 cm   Wound Width (cm) 0.8 cm   Wound Depth (cm) 0.8 cm   Wound Surface Area (cm^2) 0.8 cm^2   Change in Wound Size % 97.78   Wound Volume (cm^3) 0.64 cm^3   Wound Healing % 94   Post-Procedure Length (cm) 1.2 cm   Post-Procedure Width (cm) 1 cm   Post-Procedure Depth (cm) 1 cm   Post-Procedure Surface Area (cm^2) 1.2 cm^2   Post-Procedure Volume (cm^3) 1.2 cm^3   Wound Assessment Devitalized tissue   Drainage Amount Small   Drainage Description Serosanguinous   Wound Odor None   Tamika-Wound/Incision Assessment Hyperkeratosis (Callous)   Edges Defined edges     Applied gent violet, collagen, meptiel one,  aquacel ag, felt offload, tape.

## 2022-01-20 NOTE — DISCHARGE INSTRUCTIONS
Discharge Instructions for  [address]  [address] [place], [address] [facility]  [phone] Fax [phone]    NAME:  Stephanie De La Torre  YOB: 1960  MEDICAL RECORD NUMBER:  [id number]  DATE:  1/18/2022    Wound Cleansing:   Do not scrub or use excessive force. Cleanse wound prior to applying a clean dressing with:  [x] Normal Saline [] Keep Wound Dry in Shower    [x] Wound Cleanser   [x] Cleanse wound with Mild Soap & Water  [] May Shower at Discharge   [] Other:         Dressings:           Wound Location Left Foor   [] Apply Primary Dressing:       [x] Alginate with Silver   [x] Other:  Gent violet and felt offload    [x] Cover Roll Tape        [x] Change dressing:    [x] Other: when needed    Off-Loading:   [x] Off-loading when [x] walking  [] in bed [] sitting  [] Total non-weight bearing  [] Right Leg  [] Left Leg   [x] Assistive Device [] Walker [] Cane  [] Wheelchair  [] Crutches   [] Surgical shoe    [] Podus Boot(s)   [] Foam Boot(s)  [] Roll About    [] Cast Boot [] CROW Boot  [x] Other:felt offload in shoe    Dietary:  [] Diet as tolerated: [x] Calorie Diabetic Diet: [x] No Added Salt:  [x] Increase Protein: [] Other:   Activity:  [x] Activity as tolerated:              Return Appointment:  [] Wound and dressing supply provider:   [] ECF or Home Healthcare:  [] Wound Assessment: [] Physician or NP scheduled for Wound Assessment:   [x] Return Appointment: With Theresa Newell in  2  Week(s)  [] Ordered tests:      [facility] [place] Information: Should you experience any significant changes in your wound(s) or have questions about your wound care, please contact the [facility] at [address] [place] [address] 8:00 am - 4:30. If you need help with your wound outside these hours and cannot wait until we are again available, contact your PCP or go to the hospital emergency room.      PLEASE NOTE: IF YOU ARE UNABLE TO OBTAIN WOUND SUPPLIES, CONTINUE TO USE THE SUPPLIES YOU HAVE AVAILABLE UNTIL YOU ARE ABLE TO REACH US. IT IS MOST IMPORTANT TO KEEP THE WOUND COVERED AT ALL TIMES.      Physician Signature:_______________________    Date: ___________ Time:  ____________

## 2022-01-25 ENCOUNTER — HOSPITAL ENCOUNTER (OUTPATIENT)
Dept: WOUND CARE | Age: 62
Discharge: HOME | End: 2022-01-25
Attending: PODIATRIST
Payer: COMMERCIAL

## 2022-01-25 VITALS
HEART RATE: 75 BPM | DIASTOLIC BLOOD PRESSURE: 73 MMHG | OXYGEN SATURATION: 99 % | RESPIRATION RATE: 18 BRPM | SYSTOLIC BLOOD PRESSURE: 149 MMHG | TEMPERATURE: 98.6 F

## 2022-01-25 DIAGNOSIS — L97.523 NON-HEALING ULCER OF LEFT FOOT WITH NECROSIS OF MUSCLE (HCC): Primary | ICD-10-CM

## 2022-01-25 PROCEDURE — 11042 DBRDMT SUBQ TIS 1ST 20SQCM/<: CPT

## 2022-01-25 RX ORDER — LIDOCAINE HYDROCHLORIDE 20 MG/ML
JELLY TOPICAL ONCE
Status: CANCELLED | OUTPATIENT
Start: 2022-01-25 | End: 2022-01-25

## 2022-01-25 RX ORDER — LIDOCAINE HYDROCHLORIDE 20 MG/ML
JELLY TOPICAL ONCE
Status: DISPENSED | OUTPATIENT
Start: 2022-01-25 | End: 2022-01-25

## 2022-01-25 NOTE — WOUND CARE
01/25/22 0954   Wound Foot Plantar;Left   Date First Assessed/Time First Assessed: 08/03/21 1457   Present on Hospital Admission: Yes  Primary Wound Type: Diabetic Ulcer  Location: Foot  Wound Location Orientation: Plantar;Left   Wound Image     Wound Etiology Diabetic Reese 1   Dressing Status Breakthrough drainage noted   Cleansed Wound cleanser   Dressing/Treatment Collagen   Offloading for Diabetic Foot Ulcers Felt or foam   Dressing Change Due 01/28/22   Wound Length (cm) 0.9 cm   Wound Width (cm) 1 cm   Wound Depth (cm) 0.6 cm   Wound Surface Area (cm^2) 0.9 cm^2   Change in Wound Size % 97.5   Wound Volume (cm^3) 0.54 cm^3   Wound Healing % 95   Post-Procedure Length (cm) 1 cm   Post-Procedure Width (cm) 1.2 cm   Post-Procedure Depth (cm) 0.8 cm   Post-Procedure Surface Area (cm^2) 1.2 cm^2   Post-Procedure Volume (cm^3) 0.96 cm^3   Wound Assessment Slough   Drainage Amount Moderate   Drainage Description Serosanguinous   Wound Odor Mild   Tamika-Wound/Incision Assessment Hyperkeratosis (Callous)   Edges Epibole (rolled edges)   Wound Thickness Description Full thickness

## 2022-01-25 NOTE — PROGRESS NOTES
Debridement Wound Care        Problem List Items Addressed This Visit        Other    Non-healing ulcer of left foot ([address] - Primary    Relevant Medications    lidocaine (URO-JET/ GLYDO) 2 % jelly (Start on 1/25/2022 11:00 AM)    Other Relevant Orders    INITIATE OUTPATIENT WOUND CARE PROTOCOL          Medications Ordered Today   Medications    lidocaine (URO-JET/ GLYDO) 2 % jelly        Procedure Note  Indications:  Based on my examination of this patient's wound(s)/ulcer(s) today, debridement is required to promote healing and evaluate the wound base.     Performed by: Usha Gr DPM    Consent obtained: Yes    Time out taken: Yes    Debridement: Excisional    Using  #10 blade the wound(s)/ulcer(s) was/were sharply debrided down through and including the removal of    epidermis, dermis, subcutaneous tissue and muscle/fascia    Devitalized Tissue Debrided: fibrin, biofilm and callus    Pre Debridement Measurements:  Are located in the [place]  Documentation Flow Sheet    Wound/Ulcer #: 1    Post Debridement Measurements:  Wound/Ulcer Descriptions are Pre Debridement except measurements: Diabetic ulcer, muscle necrosis    Wound Foot Plantar;Left (Active)   Wound Image    01/18/22 1015   Wound Etiology Diabetic 01/18/22 1015   Dressing Status Intact;Breakthrough drainage noted 01/18/22 1015   Cleansed Wound cleanser 01/18/22 1015   Dressing/Treatment Alginate with Ag;Collagen 01/18/22 1015   Offloading for Diabetic Foot Ulcers Felt or foam 01/18/22 1015   Dressing Change Due 01/25/22 01/18/22 1015   Wound Length (cm) 0.9 cm 01/25/22 0954   Wound Width (cm) 1 cm 01/25/22 0954   Wound Depth (cm) 0.6 cm 01/25/22 0954   Wound Surface Area (cm^2) 0.9 cm^2 01/25/22 0954   Change in Wound Size % 97.5 01/25/22 0954   Wound Volume (cm^3) 0.54 cm^3 01/25/22 0954   Wound Healing % 95 01/25/22 0954   Post-Procedure Length (cm) 1.2 cm 01/18/22 1015   Post-Procedure Width (cm) 1 cm 01/18/22 1015   Post-Procedure Depth (cm) 1 cm 01/18/22 1015   Post-Procedure Surface Area (cm^2) 1.2 cm^2 01/18/22 1015   Post-Procedure Volume (cm^3) 1.2 cm^3 01/18/22 1015   Wound Assessment Devitalized tissue 01/18/22 1015   Drainage Amount Small 01/18/22 1015   Drainage Description Serosanguinous 01/18/22 1015   Wound Odor None 01/18/22 1015   Tamika-Wound/Incision Assessment Hyperkeratosis (Callous) 01/18/22 1015   Edges Defined edges 01/18/22 1015   Wound Thickness Description Full thickness 01/11/22 0847   Number of days: 175        Percent of Wound(s)/Ulcer(s) Debrided: 100%    Total Surface Area Debrided:  Approx 2 sq cm See RN's note    Diabetic/Pressure/Non Pressure Ulcers only: Diabetic ulcer, muscle necrosis  Ulcer:     Estimated Blood Loss:  Minimal     Hemostasis Achieved: Pressure    Procedural Pain: 0 / 10     Post Procedural Pain: 0 / 10     Response to treatment: Well tolerated by patient

## 2022-01-25 NOTE — DISCHARGE INSTRUCTIONS
Discharge Instructions for  [address]  [address] [place], [place], [address] [facility]  [phone] Fax [phone]    NAME:  Cat Pozo  YOB: 1960  MEDICAL RECORD NUMBER:  [id number]  DATE:  1/25/2022    Wound Cleansing:   Do not scrub or use excessive force. Cleanse wound prior to applying a clean dressing with:  [] Normal Saline [] Keep Wound Dry in Shower    [x] Wound Cleanser   [] Cleanse wound with Mild Soap & Water  [] May Shower at Discharge   [] Other:      Topical Treatments:  Do not apply lotions, creams, or ointments to wound bed unless directed. [] Apply moisturizing lotion to skin surrounding the wound prior to dressing change.  [] Apply antifungal ointment to skin surrounding the wound prior to dressing change.  [] Apply thin film of moisture barrier ointment to skin immediately around wound. [x] Other: Gent violet to periwound       Dressings:           Wound Location LLE   [x] Apply Primary Dressing:       [] MediHoney Gel [] Alginate with Silver [] Alginate   [] Collagen [] Collagen with Silver   [] Santyl with Moisten saline gauze     [] Hydrocolloid   [] MediHoney Alginate [] Foam with Silver   [] Foam   [] Hydrofera Blue    [] Mepilex Border    [] Moisten with Saline [] Hydrogel [] Mepitel     [] Bactroban/Mupirocin [] Polysporin  [x] Other:  Leave mepitel one intact   [x] Cover and Secure with:     [] Gauze [] Leonidas [] Kerlix   [] Ace Wrap [] Cover Roll Tape [] ABD     [x] Other: Tape   Avoid contact of tape with skin.   [x] Change dressing: [] Daily    [] Every Other Day [] Three times per week   [] Once a week [] Do Not Change Dressing   [x] Other: Twice weekly     Edema Control:  Apply: [x] Compression Stocking [x]Right Leg [x]Left Leg   [] Tubigrip []Right Leg Double Layer []Left Leg Double Layer       []Right Leg Single Layer []Left Leg Single Layer   [] SpandaGrip []Right Leg  []Left Leg      []Low compression 5-10 mm/Hg      []Medium compression 10-20 mm/Hg     []High compression  20-30 mm/Hg   every morning immediately when getting up should be applied to affected leg(s) from mid foot to knee making sure to cover the heel. Remove every night before going to bed. [] Elevate leg(s) above the level of the heart when sitting. [] Avoid prolonged standing in one place. [] Elevate arm/hand above the level of the heart []RightArm []LeftArm     Off-Loading:   [x] Off-loading when [x] walking  [] in bed [] sitting  [] Total non-weight bearing  [] Right Leg  [] Left Leg   [] Assistive Device [] Walker [] Cane  [] Wheelchair  [] Crutches   [x] Surgical shoe    [] Podus Boot(s)   [] Foam Boot(s)  [] Roll About    [] Cast Boot [] CROW Boot  [] Other:    Dietary:  [x] Diet as tolerated: [x] Calorie Diabetic Diet: [] No Added Salt:  [] Increase Protein: [] Other:   Activity:  [x] Activity as tolerated:  [] Patient has no activity restrictions     [] Strict Bedrest: [] Remain off Work:     [] May return to full duty work:                                   [] Return to work with restrictions:             Return Appointment:  [] Wound and dressing supply provider:   [] ECF or Home Healthcare:  [] Wound Assessment: [] Physician or NP scheduled for Wound Assessment:   [x] Return Appointment: With MD  in  1 Week(s)  [] Ordered tests:      Electronically signed April Yasmani Cardona RN on 1/25/2022 at 11:08 AM     Lexi [address]: Should you experience any significant changes in your wound(s) or have questions about your wound care, please contact the [facility] at [address] [place] [address] 8:00 am - 4:30. If you need help with your wound outside these hours and cannot wait until we are again available, contact your PCP or go to the hospital emergency room. PLEASE NOTE: IF YOU ARE UNABLE TO OBTAIN WOUND SUPPLIES, CONTINUE TO USE THE SUPPLIES YOU HAVE AVAILABLE UNTIL YOU ARE ABLE TO REACH US.  IT IS MOST IMPORTANT TO KEEP THE WOUND COVERED AT ALL TIMES.

## 2022-02-01 ENCOUNTER — HOSPITAL ENCOUNTER (OUTPATIENT)
Dept: WOUND CARE | Age: 62
Discharge: HOME | End: 2022-02-01
Attending: PODIATRIST
Payer: COMMERCIAL

## 2022-02-01 VITALS
HEART RATE: 78 BPM | RESPIRATION RATE: 18 BRPM | OXYGEN SATURATION: 99 % | DIASTOLIC BLOOD PRESSURE: 64 MMHG | SYSTOLIC BLOOD PRESSURE: 134 MMHG | TEMPERATURE: 98.6 F

## 2022-02-01 DIAGNOSIS — L97.523 NON-HEALING ULCER OF LEFT FOOT WITH NECROSIS OF MUSCLE (HCC): Primary | ICD-10-CM

## 2022-02-01 PROCEDURE — 11042 DBRDMT SUBQ TIS 1ST 20SQCM/<: CPT

## 2022-02-01 RX ORDER — LIDOCAINE HYDROCHLORIDE 20 MG/ML
JELLY TOPICAL ONCE
Status: CANCELLED | OUTPATIENT
Start: 2022-02-01 | End: 2022-02-01

## 2022-02-01 RX ORDER — LIDOCAINE HYDROCHLORIDE 20 MG/ML
JELLY TOPICAL ONCE
Status: COMPLETED | OUTPATIENT
Start: 2022-02-01 | End: 2022-02-01

## 2022-02-01 RX ADMIN — LIDOCAINE HYDROCHLORIDE 5 ML: 20 JELLY TOPICAL at 10:00

## 2022-02-01 NOTE — PROGRESS NOTES
Debridement Wound Care        Problem List Items Addressed This Visit        Other    Non-healing ulcer of left foot ([facility]) - Primary    Relevant Medications    lidocaine (URO-JET/ GLYDO) 2 % jelly (Completed)    Other Relevant Orders    INITIATE OUTPATIENT WOUND CARE PROTOCOL          Medications Ordered Today   Medications    lidocaine (URO-JET/ GLYDO) 2 % jelly        Procedure Note  Indications:  Based on my examination of this patient's wound(s)/ulcer(s) today, debridement is required to promote healing and evaluate the wound base.     Performed by: Usha Gr DPM    Consent obtained: Yes    Time out taken: Yes    Debridement: Excisional    Using  Curette the wound(s)/ulcer(s) was/were sharply debrided down through and including the removal of  Skin, sub Q, muscle and fascia      Devitalized Tissue Debrided: biofilm, slough and callus    Pre Debridement Measurements:  Are located in the Wound/Ulcer Documentation Flow Sheet    Wound/Ulcer #: 1    Post Debridement Measurements:  Wound/Ulcer Descriptions are Pre Debridement except measurements: Diabetic ulcer, muscle necrosis    Wound Foot Plantar;Left (Active)   Wound Image   02/01/22 1032   Wound Etiology Diabetic Reese 1 02/01/22 1032   Dressing Status Breakthrough drainage noted 02/01/22 1032   Cleansed Wound cleanser 02/01/22 1032   Dressing/Treatment Collagen with Ag 02/01/22 1032   Offloading for Diabetic Foot Ulcers Felt or foam 02/01/22 1032   Dressing Change Due 02/08/22 02/01/22 1032   Wound Length (cm) 1 cm 02/01/22 1032   Wound Width (cm) 1 cm 02/01/22 1032   Wound Depth (cm) 0.5 cm 02/01/22 1032   Wound Surface Area (cm^2) 1 cm^2 02/01/22 1032   Change in Wound Size % 97.22 02/01/22 1032   Wound Volume (cm^3) 0.5 cm^3 02/01/22 1032   Wound Healing % 95 02/01/22 1032   Post-Procedure Length (cm) 1 cm 01/25/22 0954   Post-Procedure Width (cm) 1.2 cm 01/25/22 0954   Post-Procedure Depth (cm) 0.8 cm 01/25/22 0954   Post-Procedure Surface Area (cm^2) 1.2 cm^2 01/25/22 0954   Post-Procedure Volume (cm^3) 0.96 cm^3 01/25/22 0954   Wound Assessment Slough 02/01/22 1032   Drainage Amount Small 02/01/22 1032   Drainage Description Serosanguinous 02/01/22 1032   Wound Odor Mild 02/01/22 1032   Tamika-Wound/Incision Assessment Hyperkeratosis (Callous) 02/01/22 1032   Edges Epibole (rolled edges) 02/01/22 1032   Wound Thickness Description Full thickness 02/01/22 1032   Number of days: 182        Percent of Wound(s)/Ulcer(s) Debrided: 100%    Total Surface Area Debrided:  Approx 3 sq cm See RN's note    Diabetic/Pressure/Non Pressure Ulcers only: Diabetic ulcer, muscle necrosis  Ulcer:     Estimated Blood Loss:  Minimal     Hemostasis Achieved: Pressure    Procedural Pain: 0 / 10     Post Procedural Pain: 0 / 10     Response to treatment: Well tolerated by patient

## 2022-02-02 NOTE — WOUND CARE
02/01/22 1032   Wound Foot Plantar;Left   Date First Assessed/Time First Assessed: 08/03/21 1457   Present on Hospital Admission: Yes  Primary Wound Type: Diabetic Ulcer  Location: Foot  Wound Location Orientation: Plantar;Left   Wound Image     Wound Etiology Diabetic Reese 1   Dressing Status Breakthrough drainage noted   Cleansed Wound cleanser   Dressing/Treatment Collagen with Ag   Offloading for Diabetic Foot Ulcers Felt or foam   Dressing Change Due 02/08/22   Wound Length (cm) 1 cm   Wound Width (cm) 1 cm   Wound Depth (cm) 0.5 cm   Wound Surface Area (cm^2) 1 cm^2   Change in Wound Size % 97.22   Wound Volume (cm^3) 0.5 cm^3   Wound Healing % 95   Post-Procedure Length (cm) 1.2 cm   Post-Procedure Width (cm) 1.2 cm   Post-Procedure Depth (cm) 0.5 cm   Post-Procedure Surface Area (cm^2) 1.44 cm^2   Post-Procedure Volume (cm^3) 0.72 cm^3   Wound Assessment Slough   Drainage Amount Small   Drainage Description Serosanguinous   Wound Odor Mild   Tamika-Wound/Incision Assessment Hyperkeratosis (Callous)   Edges Epibole (rolled edges)   Wound Thickness Description Full thickness

## 2022-02-03 NOTE — DISCHARGE INSTRUCTIONS
Discharge Instructions for  [address]  [address] [place], [facility]0 [facility]  [phone] Fax [phone]    NAME:  Lily Cherry  YOB: 1960  MEDICAL RECORD NUMBER:  [id number]  DATE:  2/1/2022    Wound Cleansing:   Do not scrub or use excessive force. Cleanse wound prior to applying a clean dressing with:  [] Normal Saline [] Keep Wound Dry in Shower    [x] Wound Cleanser   [] Cleanse wound with Mild Soap & Water  [] May Shower at Discharge   [] Other:      Topical Treatments:  Do not apply lotions, creams, or ointments to wound bed unless directed. [x] Apply moisturizing lotion to skin surrounding the wound prior to dressing change. [x] Apply antifungal ointment to skin surrounding the wound prior to dressing change. [x] Apply thin film of moisture barrier ointment to skin immediately around wound. [] Other:       Dressings:           Wound Location ***   [x] Apply Primary Dressing:       [] MediHoney Gel [] Alginate with Silver [] Alginate   [] Collagen [x] Collagen with Silver   [] Santyl with Moisten saline gauze     [] Hydrocolloid   [] MediHoney Alginate [] Foam with Silver   [x] Foam   [] Hydrofera Blue    [] Mepilex Border    [] Moisten with Saline [] Hydrogel [x] Mepitel     [] Bactroban/Mupirocin [] Polysporin  [x] Other: felt  [] Pack wound loosely with  [] Iodoform   [] Plain Packing  [] Other   [x] Cover and Secure with:     [x] Gauze [] Leonidas [x] Kerlix   [] Ace Wrap [] Cover Roll Tape [x] ABD     [x] Other: coban   Avoid contact of tape with skin.   [x] Change dressing: [] Daily    [] Every Other Day [] Three times per week   [x] Once a week [] Do Not Change Dressing   [] Other:     Negative Pressure:           Wound Location:   [] Pressure@           mm/Hg  []Continuous []Intermittent   [] Black  [] White Foam [] Other:   []Change dressing: []Three times per week    []Other:     Pressure Relief:  [] When sitting, shift position or do seat lifts every 15 minutes.  [] Wheelchair cushion [] Specialty Bed/Mattress  [] Turn every 2 hours when in bed. Avoid position directing pressure on wound site. Limit side lying to 30 degree tilt. Limit HOB elevation to 30 degrees. Edema Control:  Apply: [] Compression Stocking []Right Leg []Left Leg   [] Tubigrip []Right Leg Double Layer []Left Leg Double Layer       []Right Leg Single Layer []Left Leg Single Layer   [] SpandaGrip []Right Leg  []Left Leg      []Low compression 5-10 mm/Hg      []Medium compression 10-20 mm/Hg     []High compression  20-30 mm/Hg   every morning immediately when getting up should be applied to affected leg(s) from mid foot to knee making sure to cover the heel. Remove every night before going to bed. [x] Elevate leg(s) above the level of the heart when sitting. [x] Avoid prolonged standing in one place. [] Elevate arm/hand above the level of the heart []RightArm []LeftArm     Compression:  Apply: [] Multilayer Compression Wrap Applied in Clinic []RightLeg []Left Leg   [] Multi-layer compression. Do not get leg(s) with wrap wet. If wraps become too tight call the center or completely remove the wrap. [] Elevate leg(s) above the level of the heart when sitting. [] Avoid prolonged standing in one place. Off-Loading:   [] Off-loading when [] walking  [] in bed [] sitting  [] Total non-weight bearing  [] Right Leg  [] Left Leg   [] Assistive Device [] Walker [] Cane  [] Wheelchair  [] Crutches   [] Surgical shoe    [] Podus Boot(s)   [] Foam Boot(s)  [] Roll About    [] Cast Boot [] CROW Boot  [] Other:    Contact Cast:  Apply: [] Total Contact Cast Applied in Clinic []RightLeg []Left Leg   [] Do not get cast wet. Contact center or go to emergency room if there is a foul odor or becomes uncomfortable due to feeling tight or swelling. Do not use objects inside of cast to scratch.       Dietary:  [x] Diet as tolerated: [] Calorie Diabetic Diet: [] No Added Salt:  [] Increase Protein: [] Other:   Activity:  [x] Activity as tolerated:  [x] Patient has no activity restrictions     [] Strict Bedrest: [] Remain off Work:     [] May return to full duty work:                                   [] Return to work with restrictions:             Return Appointment:  [x] Wound and dressing supply provider:   [] ECF or Home Healthcare:  [x] Wound Assessment: [x] Physician or NP scheduled for Wound Assessment:   [x] Return Appointment:1 [facility])  [] Ordered tests:      Electronically signed Hailey Vasquez LPN on 9/6/2726 at [address] [address]: Should you experience any significant changes in your wound(s) or have questions about your wound care, please contact the [facility] Main at [address] [place] [address] Street 8:00 am - 4:30. If you need help with your wound outside these hours and cannot wait until we are again available, contact your PCP or go to the hospital emergency room. PLEASE NOTE: IF YOU ARE UNABLE TO OBTAIN WOUND SUPPLIES, CONTINUE TO USE THE SUPPLIES YOU HAVE AVAILABLE UNTIL YOU ARE ABLE TO REACH US. IT IS MOST IMPORTANT TO KEEP THE WOUND COVERED AT ALL TIMES.      Physician Signature:_______________________    Date: ___________ Time:  ____________

## 2022-02-08 ENCOUNTER — HOSPITAL ENCOUNTER (OUTPATIENT)
Dept: WOUND CARE | Age: 62
Discharge: HOME | End: 2022-02-08
Attending: PODIATRIST
Payer: COMMERCIAL

## 2022-02-08 DIAGNOSIS — L97.523 NON-HEALING ULCER OF LEFT FOOT WITH NECROSIS OF MUSCLE (HCC): Primary | ICD-10-CM

## 2022-02-08 PROCEDURE — 11042 DBRDMT SUBQ TIS 1ST 20SQCM/<: CPT

## 2022-02-08 RX ORDER — LIDOCAINE HYDROCHLORIDE 20 MG/ML
JELLY TOPICAL ONCE
Status: CANCELLED | OUTPATIENT
Start: 2022-02-08 | End: 2022-02-08

## 2022-02-08 NOTE — PROGRESS NOTES
Debridement Wound Care        Problem List Items Addressed This Visit     None          No orders of the defined types were placed in this encounter. Procedure Note  Indications:  Based on my examination of this patient's wound(s)/ulcer(s) today, debridement is required to promote healing and evaluate the wound base. Performed by: Laura Rucker DPM    Consent obtained: Yes    Time out taken: Yes    Debridement: Excisional    Using  Curette and #10 blade the wound(s)/ulcer(s) was/were sharply debrided down through and including the removal of    epidermis, dermis, subcutaneous tissue and muscle/fascia    Devitalized Tissue Debrided: fibrin, biofilm, slough and callus    Pre Debridement Measurements:  Are located in the [place]  Documentation Flow Sheet    Wound/Ulcer #: 1    Post Debridement Measurements:  Wound/Ulcer Descriptions are Pre Debridement except measurements: Diabetic ulcer, muscle necrosis    Wound Foot Plantar;Left (Active)   Wound Image    02/01/22 1032   Wound Etiology Diabetic 02/08/22 1023   Dressing Status Breakthrough drainage noted 02/08/22 1023   Cleansed Wound cleanser 02/08/22 1023   Dressing/Treatment Collagen;Dry dressing; Foam 02/08/22 1023   Offloading for Diabetic Foot Ulcers Felt or foam 02/08/22 1023   Dressing Change Due 02/15/22 02/08/22 1023   Wound Length (cm) 0.7 cm 02/08/22 1023   Wound Width (cm) 0.9 cm 02/08/22 1023   Wound Depth (cm) 0.8 cm 02/08/22 1023   Wound Surface Area (cm^2) 0.63 cm^2 02/08/22 1023   Change in Wound Size % 98.25 02/08/22 1023   Wound Volume (cm^3) 0.504 cm^3 02/08/22 1023   Wound Healing % 95 02/08/22 1023   Post-Procedure Length (cm) 1.2 cm 02/01/22 1032   Post-Procedure Width (cm) 1.2 cm 02/01/22 1032   Post-Procedure Depth (cm) 0.5 cm 02/01/22 1032   Post-Procedure Surface Area (cm^2) 1.44 cm^2 02/01/22 1032   Post-Procedure Volume (cm^3) 0.72 cm^3 02/01/22 1032   Undermining Starts ___ O'Clock 9 o'clock 02/08/22 1023   Undermining Ends ___ O'Clock 3 o'clock 02/08/22 1023   Undermining Maximum Distance (cm) 0.4 cm 02/08/22 1023   Wound Assessment Devitalized tissue 02/08/22 1023   Drainage Amount Small 02/08/22 1023   Drainage Description Serosanguinous 02/08/22 1023   Wound Odor None 02/08/22 1023   Tamika-Wound/Incision Assessment Maceration 02/08/22 1023   Edges Defined edges 02/08/22 1023   Wound Thickness Description Full thickness 02/01/22 1032   Number of days: 189        Percent of Wound(s)/Ulcer(s) Debrided: 100%    Total Surface Area Debrided:  Approx 2 sq cm See RN's note    Diabetic/Pressure/Non Pressure Ulcers only: Diabetic ulcer, muscle necrosis  Ulcer:     Estimated Blood Loss:  Minimal     Hemostasis Achieved: Pressure    Procedural Pain: 0 / 10     Post Procedural Pain: 0 / 10     Response to treatment: Well tolerated by patient    Prescribed CROW walker since the ulcer is becoming recurrent   Also discussed patient can seek surgical treatment for reconstruction of the Charcot foot  Patient is not interested in surgical option at this point

## 2022-02-14 VITALS
SYSTOLIC BLOOD PRESSURE: 135 MMHG | TEMPERATURE: 98.7 F | OXYGEN SATURATION: 99 % | RESPIRATION RATE: 18 BRPM | DIASTOLIC BLOOD PRESSURE: 65 MMHG | HEART RATE: 78 BPM

## 2022-02-14 NOTE — WOUND CARE
[address]:     [facility] [id number] [facility], [address] [place] [address] p: [phone] f: [phone]    Ordering Center:     [facility] OP WOUND CARE  [address] [address]  [facility] [address] [phone]   [facility]7 [facility] NUMBER [phone]    Patient Information:      Altamese Bloodgood  [address] [address]   [unfilled]   : 1960  AGE: 57 y.o. GENDER: male   TODAYS DATE:  2022    Insurance:      PRIMARY [facility]URANCE[facility]  [id number] [place]    Payor/Plan Subscr  Sex Relation Sub. Ins. ID Effective Group Num   1. BLUE CROSS - Mg [employer] 1960 Male Self [id number]                                   [address] [address]   2. [address] 1960 Male Self [id number]                                   [address] [address] -       Patient Wound Information:      Problem List Items Addressed This Visit        Other    Non-healing ulcer of left foot ([address] - Primary          WOUNDS REQUIRING DRESSING SUPPLIES:     Wound Foot Plantar;Left (Active)   Wound Image    22 1032   Wound Etiology Diabetic 22 1023   Dressing Status Breakthrough drainage noted 22 1023   Cleansed Wound cleanser 22 1023   Dressing/Treatment Collagen;Dry dressing; Foam 22 1023   Offloading for Diabetic Foot Ulcers Felt or foam 22 1023   Dressing Change Due 02/15/22 02/08/22 1023   Wound Length (cm) 0.7 cm 22 1023   Wound Width (cm) 0.9 cm 22 1023   Wound Depth (cm) 0.8 cm 22 1023   Wound Surface Area (cm^2) 0.63 cm^2 22 1023   Change in Wound Size % 98.25 22 1023   Wound Volume (cm^3) 0.504 cm^3 22 1023   Wound Healing % 95 22 1023   Post-Procedure Length (cm) 1.2 cm 22 1032   Post-Procedure Width (cm) 1.2 cm 22 1032   Post-Procedure Depth (cm) 0.5 cm 22 1032   Post-Procedure Surface Area (cm^2) 1.44 cm^2 02/01/22 1032   Post-Procedure Volume (cm^3) 0.72 cm^3 02/01/22 1032   Undermining Starts ___ O'Clock 9 o'clock 02/08/22 1023   Undermining Ends ___ O'Clock 3 o'clock 02/08/22 1023   Undermining Maximum Distance (cm) 0.4 cm 02/08/22 1023   Wound Assessment Devitalized tissue 02/08/22 1023   Drainage Amount Small 02/08/22 1023   Drainage Description Serosanguinous 02/08/22 1023   Wound Odor None 02/08/22 1023   Tamika-Wound/Incision Assessment Maceration 02/08/22 1023   Edges Defined edges 02/08/22 1023   Wound Thickness Description Full thickness 02/01/22 1032   Number of days: 195        Supplies Requested :      WOUND #:1   PRIMARY DRESSING:  Alginate rope and Collagen    Extra absorbant pad and Other tape and 1/4\" felt     FREQUENCY OF DRESSING CHANGES:  Every other day       ADDITIONAL ITEMS:  [] Gloves Small  [x] Gloves Medium [x] Gloves Large [] Gloves XLarge  [] Tape 1\" [x] Tape 2\" [] Tape 3\"  [] Medipore Tape  [x] Saline  [] Skin Prep   [] Adhesive Remover   [] Cotton Tip Applicators   [] Other:    Patient Wound(s) Debrided: [x] Yes if yes please add date 2/8/22    Debribement Type: Excisional/Sharp    Patient currently being seen by Home Health: [] Yes   [x] No    Duration for needed supplies:  []15  [x]30  []60  []90 Days    Electronically signed by Esther Hyde RN     Provider Information:      PROVIDER'S NAMEChanmelissa Babbcecy  NPI: [id number]

## 2022-02-14 NOTE — DISCHARGE INSTRUCTIONS
Discharge Instructions for  [address]  [address] [employer], [address] [facility]  [phone] Fax [phone]    NAME:  Keegan Williamson  YOB: 1960  MEDICAL RECORD NUMBER:  [id number]  DATE:  2/8/2022      Off-Loading:   [] Off-loading when [x] walking  [x] in bed [x] sitting     [x] Assistive Device [] Walker [] Cane  [] Wheelchair  [] Crutches   [x] Surgical shoe    [] Podus Boot(s)   [] Foam Boot(s)  [] Roll About    [] Cast Boot [] CROW Boot  [] Other:    Dietary:  [] Diet as tolerated: [x] Calorie Diabetic Diet: [x] No Added Salt:  [x] Increase Protein: [] Other:   Activity:  [x] Activity as tolerated:  [] Patient has no activity restrictions     [] Strict Bedrest: [] Remain off Work:     [] May return to full duty work:                                   [] Return to work with restrictions:             Return Appointment:  [] Wound and dressing supply provider: [facility]  [] ECF or Home Healthcare:  [] Wound Assessment: [] Physician or NP scheduled for Wound Assessment:   [x] Return Appointment: With [facility]   in  2 [facility])  [] Ordered tests:      Electronically signed [facility], [address] Information: Should you experience any significant changes in your wound(s) or have questions about your wound care, please contact the [facility] Main at [address] [place] [address] 8:00 am - 4:30. If you need help with your wound outside these hours and cannot wait until we are again available, contact your PCP or go to the hospital emergency room. PLEASE NOTE: IF YOU ARE UNABLE TO OBTAIN WOUND SUPPLIES, CONTINUE TO USE THE SUPPLIES YOU HAVE AVAILABLE UNTIL YOU ARE ABLE TO REACH US. IT IS MOST IMPORTANT TO KEEP THE WOUND COVERED AT ALL TIMES.      Physician Signature:_______________________    Date: ___________ Time:  ____________

## 2022-02-14 NOTE — WOUND CARE
02/08/22 1023   Wound Foot Plantar;Left   Date First Assessed/Time First Assessed: 08/03/21 1457   Present on Hospital Admission: Yes  Primary Wound Type: Diabetic Ulcer  Location: Foot  Wound Location Orientation: Plantar;Left   Wound Etiology Diabetic   Dressing Status Breakthrough drainage noted   Cleansed Wound cleanser   Dressing/Treatment Collagen;Dry dressing; Foam   Offloading for Diabetic Foot Ulcers Felt or foam   Dressing Change Due 02/15/22   Wound Length (cm) 0.7 cm   Wound Width (cm) 0.9 cm   Wound Depth (cm) 0.8 cm   Wound Surface Area (cm^2) 0.63 cm^2   Change in Wound Size % 98.25   Wound Volume (cm^3) 0.504 cm^3   Wound Healing % 95   Undermining Starts ___ O'Clock 9 o'clock   Undermining Ends ___ O'Clock 3 o'clock   Undermining Maximum Distance (cm) 0.4 cm   Wound Assessment Devitalized tissue   Drainage Amount Small   Drainage Description Serosanguinous   Wound Odor None   Tamika-Wound/Incision Assessment Maceration   Edges Defined edges     Applied collagen, mepitel one, felt offload, exudry and tape. Patient and doctor discussed cast vs a cam walker. Patient to think over treatment plans.

## 2022-02-22 ENCOUNTER — HOSPITAL ENCOUNTER (OUTPATIENT)
Dept: WOUND CARE | Age: 62
Discharge: HOME | End: 2022-02-22
Attending: PODIATRIST
Payer: COMMERCIAL

## 2022-02-22 VITALS
HEART RATE: 78 BPM | SYSTOLIC BLOOD PRESSURE: 148 MMHG | OXYGEN SATURATION: 97 % | DIASTOLIC BLOOD PRESSURE: 68 MMHG | RESPIRATION RATE: 18 BRPM | TEMPERATURE: 98.3 F

## 2022-02-22 DIAGNOSIS — L97.523 NON-HEALING ULCER OF LEFT FOOT WITH NECROSIS OF MUSCLE (HCC): Primary | ICD-10-CM

## 2022-02-22 PROCEDURE — 11043 DBRDMT MUSC&/FSCA 1ST 20/<: CPT

## 2022-02-22 RX ORDER — LIDOCAINE HYDROCHLORIDE 20 MG/ML
JELLY TOPICAL ONCE
Status: DISPENSED | OUTPATIENT
Start: 2022-02-22 | End: 2022-02-22

## 2022-02-22 RX ORDER — LIDOCAINE HYDROCHLORIDE 20 MG/ML
JELLY TOPICAL ONCE
Status: CANCELLED | OUTPATIENT
Start: 2022-02-22 | End: 2022-02-22

## 2022-02-22 NOTE — PROGRESS NOTES
Debridement Wound Care        Problem List Items Addressed This Visit        Other    Non-healing ulcer of left foot ([address] - Primary    Relevant Medications    lidocaine (URO-JET/ GLYDO) 2 % jelly (Start on 2/22/2022 12:00 PM)    Other Relevant Orders    INITIATE OUTPATIENT WOUND CARE PROTOCOL          Medications Ordered Today   Medications    lidocaine (URO-JET/ GLYDO) 2 % jelly        Procedure Note  Indications:  Based on my examination of this patient's wound(s)/ulcer(s) today, debridement is required to promote healing and evaluate the wound base. Performed by: Johana Dobson DPM    Consent obtained: Yes    Time out taken: Yes    Debridement: Excisional    Using  Curette the wound(s)/ulcer(s) was/were sharply debrided down through and including the removal of    epidermis, dermis, subcutaneous tissue and muscle/fascia    Devitalized Tissue Debrided: fibrin, biofilm, slough and callus    Pre Debridement Measurements:  Are located in the [place]  Documentation Flow Sheet    Wound/Ulcer #: 1    Post Debridement Measurements:  Wound/Ulcer Descriptions are Pre Debridement except measurements: Diabetic ulcer, muscle necrosis    Wound Foot Plantar;Left (Active)   Wound Image    02/01/22 1032   Wound Etiology Diabetic 02/22/22 1057   Dressing Status Intact; New drainage noted 02/22/22 1057   Cleansed Wound cleanser 02/22/22 1057   Dressing/Treatment Collagen;Dry dressing; Foam 02/08/22 1023   Offloading for Diabetic Foot Ulcers Felt or foam 02/08/22 1023   Dressing Change Due 02/15/22 02/08/22 1023   Wound Length (cm) 1 cm 02/22/22 1057   Wound Width (cm) 1.5 cm 02/22/22 1057   Wound Depth (cm) 0.9 cm 02/22/22 1057   Wound Surface Area (cm^2) 1.5 cm^2 02/22/22 1057   Change in Wound Size % 95.83 02/22/22 1057   Wound Volume (cm^3) 1.35 cm^3 02/22/22 1057   Wound Healing % 88 02/22/22 1057   Post-Procedure Length (cm) 1.2 cm 02/01/22 1032   Post-Procedure Width (cm) 1.2 cm 02/01/22 1032   Post-Procedure Depth (cm) 0.5 cm 02/01/22 1032   Post-Procedure Surface Area (cm^2) 1.44 cm^2 02/01/22 1032   Post-Procedure Volume (cm^3) 0.72 cm^3 02/01/22 1032   Undermining Starts ___ O'Clock 9 o'clock 02/08/22 1023   Undermining Ends ___ O'Clock 3 o'clock 02/08/22 1023   Undermining Maximum Distance (cm) 0.4 cm 02/08/22 1023   Wound Assessment Devitalized tissue 02/08/22 1023   Drainage Amount Small 02/22/22 1057   Drainage Description Serosanguinous 02/22/22 1057   Wound Odor None 02/22/22 1057   Tamika-Wound/Incision Assessment Intact; Hyperkeratosis (Callous) 02/22/22 1057   Edges Defined edges;Epibole (rolled edges) 02/22/22 1057   Wound Thickness Description Full thickness 02/22/22 1057   Number of days: 203        Percent of Wound(s)/Ulcer(s) Debrided: 100%    Total Surface Area Debrided:  Approx 2 sq cm See RN's note    Diabetic/Pressure/Non Pressure Ulcers only: Diabetic ulcer, muscle necrosis  Ulcer:     Estimated Blood Loss:  Minimal     Hemostasis Achieved: Pressure    Procedural Pain: 0 / 10     Post Procedural Pain: 0 / 10     Response to treatment: Well tolerated by patient

## 2022-02-22 NOTE — WOUND CARE
02/22/22 1057   Wound Foot Plantar;Left   Date First Assessed/Time First Assessed: 08/03/21 1457   Present on Hospital Admission: Yes  Primary Wound Type: Diabetic Ulcer  Location: Foot  Wound Location Orientation: Plantar;Left   Wound Image     Wound Etiology Diabetic   Dressing Status Intact; New drainage noted   Cleansed Wound cleanser   Offloading for Diabetic Foot Ulcers Offloading ordered;Felt or foam  (felt)   Dressing Change Due 02/25/22   Wound Length (cm) 1 cm   Wound Width (cm) 1.5 cm   Wound Depth (cm) 0.9 cm   Wound Surface Area (cm^2) 1.5 cm^2   Change in Wound Size % 95.83   Wound Volume (cm^3) 1.35 cm^3   Wound Healing % 88   Post-Procedure Length (cm) 1.2 cm   Post-Procedure Width (cm) 1.7 cm   Post-Procedure Depth (cm) 1.1 cm   Post-Procedure Surface Area (cm^2) 2.04 cm^2   Post-Procedure Volume (cm^3) 2.244 cm^3   Wound Assessment Granulation tissue   Drainage Amount Small   Drainage Description Serosanguinous   Wound Odor None   Tamika-Wound/Incision Assessment Intact; Hyperkeratosis (Callous)   Edges Defined edges;Epibole (rolled edges)   Wound Thickness Description Full thickness    applied offload of 1/2 inch felt.  Felecia to wound bed secured with mepitel exudry secured with tape

## 2022-03-01 ENCOUNTER — HOSPITAL ENCOUNTER (OUTPATIENT)
Dept: WOUND CARE | Age: 62
Discharge: HOME | End: 2022-03-01
Attending: PODIATRIST
Payer: COMMERCIAL

## 2022-03-01 VITALS
RESPIRATION RATE: 18 BRPM | TEMPERATURE: 97.7 F | DIASTOLIC BLOOD PRESSURE: 70 MMHG | OXYGEN SATURATION: 100 % | SYSTOLIC BLOOD PRESSURE: 148 MMHG | HEART RATE: 74 BPM

## 2022-03-01 DIAGNOSIS — L97.523 NON-HEALING ULCER OF LEFT FOOT WITH NECROSIS OF MUSCLE (HCC): Primary | ICD-10-CM

## 2022-03-01 PROCEDURE — 11042 DBRDMT SUBQ TIS 1ST 20SQCM/<: CPT

## 2022-03-01 RX ORDER — LIDOCAINE HYDROCHLORIDE 20 MG/ML
JELLY TOPICAL ONCE
Status: DISPENSED | OUTPATIENT
Start: 2022-03-01 | End: 2022-03-01

## 2022-03-01 RX ORDER — LIDOCAINE HYDROCHLORIDE 20 MG/ML
JELLY TOPICAL ONCE
OUTPATIENT
Start: 2022-03-01 | End: 2022-03-01

## 2022-03-01 NOTE — PROGRESS NOTES
Debridement Wound Care        Problem List Items Addressed This Visit        Other    Non-healing ulcer of left foot (HCC) - Primary    Relevant Medications    lidocaine (URO-JET/ GLYDO) 2 % jelly    Other Relevant Orders    INITIATE OUTPATIENT WOUND CARE PROTOCOL          Medications Ordered Today   Medications    lidocaine (URO-JET/ GLYDO) 2 % jelly        Procedure Note  Indications:  Based on my examination of this patient's wound(s)/ulcer(s) today, debridement is required to promote healing and evaluate the wound base. Performed by: Maxi Padron DPM    Consent obtained: Yes    Time out taken: Yes    Debridement: Excisional    Using  Curette the wound(s)/ulcer(s) was/were sharply debrided down through and including the removal of    epidermis, dermis and subcutaneous tissue    Devitalized Tissue Debrided: fibrin, biofilm, slough and callus    Pre Debridement Measurements:  Are located in the Wound/Ulcer Documentation Flow Sheet    Wound/Ulcer #: 1    Post Debridement Measurements:  Wound/Ulcer Descriptions are Pre Debridement except measurements: Diabetic ulcer, fat layer exposed    Wound Foot Plantar;Left (Active)   Wound Image    02/22/22 1057   Wound Etiology Diabetic 02/22/22 1057   Dressing Status Intact; New drainage noted 02/22/22 1057   Cleansed Wound cleanser 02/22/22 1057   Dressing/Treatment Collagen;Dry dressing; Foam 02/08/22 1023   Offloading for Diabetic Foot Ulcers Offloading ordered;Felt or foam 02/22/22 1057   Dressing Change Due 02/25/22 02/22/22 1057   Wound Length (cm) 1 cm 02/22/22 1057   Wound Width (cm) 1.5 cm 02/22/22 1057   Wound Depth (cm) 0.9 cm 02/22/22 1057   Wound Surface Area (cm^2) 1.5 cm^2 02/22/22 1057   Change in Wound Size % 95.83 02/22/22 1057   Wound Volume (cm^3) 1.35 cm^3 02/22/22 1057   Wound Healing % 88 02/22/22 1057   Post-Procedure Length (cm) 1.2 cm 02/22/22 1057   Post-Procedure Width (cm) 1.7 cm 02/22/22 1057   Post-Procedure Depth (cm) 1.1 cm 02/22/22 1057   Post-Procedure Surface Area (cm^2) 2.04 cm^2 02/22/22 1057   Post-Procedure Volume (cm^3) 2.244 cm^3 02/22/22 1057   Undermining Starts ___ O'Clock 9 o'clock 02/08/22 1023   Undermining Ends ___ O'Clock 3 o'clock 02/08/22 1023   Undermining Maximum Distance (cm) 0.4 cm 02/08/22 1023   Wound Assessment Granulation tissue 02/22/22 1057   Drainage Amount Small 02/22/22 1057   Drainage Description Serosanguinous 02/22/22 1057   Wound Odor None 02/22/22 1057   Tamika-Wound/Incision Assessment Intact; Hyperkeratosis (Callous) 02/22/22 1057   Edges Defined edges;Epibole (rolled edges) 02/22/22 1057   Wound Thickness Description Full thickness 02/22/22 1057   Number of days: 210        Percent of Wound(s)/Ulcer(s) Debrided: 100%    Total Surface Area Debrided:  Approx 3 sq cm See RN's note    Diabetic/Pressure/Non Pressure Ulcers only: Diabetic ulcer, fat layer exposed  Ulcer:     Estimated Blood Loss:  Minimal     Hemostasis Achieved: Pressure    Procedural Pain: 0 / 10     Post Procedural Pain: 0 / 10     Response to treatment: Well tolerated by patient

## 2022-03-01 NOTE — WOUND CARE
03/01/22 1710   Wound Foot Plantar;Left   Date First Assessed/Time First Assessed: 08/03/21 1457   Present on Hospital Admission: Yes  Primary Wound Type: Diabetic Ulcer  Location: Foot  Wound Location Orientation: Plantar;Left   Wound Image     Wound Etiology Diabetic Reese 1   Dressing Status Intact; New drainage noted   Cleansed Wound cleanser   Dressing/Treatment ABD pad;Alginate;Coban/self-adherent bandages; Collagen with Ag;Foam;Gauze dressing/dressing sponge;Roll gauze   Offloading for Diabetic Foot Ulcers Offloading ordered   Dressing Change Due 03/08/22   Wound Length (cm) 1.3 cm   Wound Width (cm) 1.5 cm   Wound Depth (cm) 0.8 cm   Wound Surface Area (cm^2) 1.95 cm^2   Change in Wound Size % 94.58   Wound Volume (cm^3) 1.56 cm^3   Wound Healing % 86   Post-Procedure Length (cm) 1.5 cm   Post-Procedure Width (cm) 1.6 cm   Post-Procedure Depth (cm) 0.9 cm   Post-Procedure Surface Area (cm^2) 2.4 cm^2   Post-Procedure Volume (cm^3) 2.16 cm^3   Undermining Starts ___ O'Clock 9 o'clock   Undermining Ends ___ O'Clock 3 o'clock   Undermining Maximum Distance (cm) 0.4 cm   Wound Assessment Granulation tissue   Drainage Amount Small   Drainage Description Serosanguinous   Wound Odor None   Tamika-Wound/Incision Assessment Blanchable erythema   Edges Defined edges   Wound Thickness Description Full thickness

## 2022-03-18 PROBLEM — I89.0 LYMPHEDEMA OF BOTH LOWER EXTREMITIES: Status: ACTIVE | Noted: 2021-06-21

## 2022-03-19 PROBLEM — L97.529: Status: ACTIVE | Noted: 2021-06-21

## 2022-03-19 PROBLEM — L03.116 CELLULITIS OF FOOT, LEFT: Status: ACTIVE | Noted: 2021-06-21

## 2022-03-19 PROBLEM — E66.01 OBESITY, MORBID (HCC): Status: ACTIVE | Noted: 2019-02-14

## 2022-03-19 PROBLEM — E11.621: Status: ACTIVE | Noted: 2021-06-21

## 2022-03-20 PROBLEM — L97.529 NON-HEALING ULCER OF LEFT FOOT (HCC): Status: ACTIVE | Noted: 2021-06-21

## 2022-03-20 PROBLEM — Z79.4 TYPE 2 DIABETES MELLITUS WITH HYPERGLYCEMIA, WITH LONG-TERM CURRENT USE OF INSULIN (HCC): Status: ACTIVE | Noted: 2019-03-18

## 2022-03-20 PROBLEM — I77.1 ILIAC ARTERY STENOSIS, LEFT (HCC): Status: ACTIVE | Noted: 2021-06-21

## 2022-03-20 PROBLEM — E11.65 TYPE 2 DIABETES MELLITUS WITH HYPERGLYCEMIA, WITH LONG-TERM CURRENT USE OF INSULIN (HCC): Status: ACTIVE | Noted: 2019-03-18

## 2023-05-24 RX ORDER — CLOPIDOGREL BISULFATE 75 MG/1
75 TABLET ORAL DAILY
COMMUNITY
Start: 2021-06-27

## 2023-05-24 RX ORDER — PRAVASTATIN SODIUM 80 MG/1
80 TABLET ORAL NIGHTLY
COMMUNITY
Start: 2019-01-15

## 2023-05-24 RX ORDER — ENALAPRIL MALEATE 20 MG/1
20 TABLET ORAL DAILY
COMMUNITY
Start: 2019-01-15

## 2023-05-24 RX ORDER — FUROSEMIDE 20 MG/1
1 TABLET ORAL DAILY
COMMUNITY
Start: 2021-07-23

## 2023-05-24 RX ORDER — INSULIN LISPRO 100 [IU]/ML
INJECTION, SOLUTION INTRAVENOUS; SUBCUTANEOUS
COMMUNITY
Start: 2020-09-23

## 2023-05-24 RX ORDER — ASPIRIN 81 MG/1
81 TABLET, CHEWABLE ORAL DAILY
COMMUNITY
Start: 2021-06-27

## 2023-05-24 RX ORDER — INSULIN GLARGINE 100 [IU]/ML
74 INJECTION, SOLUTION SUBCUTANEOUS DAILY
COMMUNITY
Start: 2018-12-05

## 2023-05-24 RX ORDER — SEMAGLUTIDE 1.34 MG/ML
0.25 INJECTION, SOLUTION SUBCUTANEOUS
COMMUNITY
Start: 2020-06-25

## 2023-05-24 RX ORDER — AMLODIPINE BESYLATE 10 MG/1
10 TABLET ORAL DAILY
COMMUNITY
Start: 2018-12-20

## 2023-05-24 RX ORDER — HYDROCHLOROTHIAZIDE 25 MG/1
1 TABLET ORAL DAILY
COMMUNITY
Start: 2019-01-30

## 2023-05-24 RX ORDER — ATENOLOL 100 MG/1
100 TABLET ORAL DAILY
COMMUNITY
Start: 2019-01-30

## 2023-05-24 RX ORDER — GLIPIZIDE 10 MG/1
10 TABLET, FILM COATED, EXTENDED RELEASE ORAL 2 TIMES DAILY
COMMUNITY
Start: 2018-12-20

## 2023-08-16 NOTE — PROGRESS NOTES
PT Evaluation     Today's date: 2023  Patient name: Yi Laureano  : 1984  MRN: [id number]  Referring provider: Andre Pearce PT  Dx:   Encounter Diagnosis     ICD-10-CM    1. Chronic left shoulder pain  M25.512     G89.29                      Assessment  Assessment details: Evon Moore is a 35year old female presenting to physical therapy with left shoulder pain. Patient presents with pain, decreased strength, decreased internal rotation range of motion and decreased tolerance to activity. Due to these impairments patient has difficulty performing activities of daily living, recreational activities and engaging in social activities. Patient would benefit from skilled physical therapy services to address these impairments and to maximize function. Thank you for the referral.   Impairments: abnormal or restricted ROM, activity intolerance, impaired physical strength, lacks appropriate home exercise program, pain with function and scapular dyskinesis  Understanding of Dx/Px/POC: excellent  Goals  Impairment Goals:  1.) Pt will have decreased sx at worst by 50% in 2-4 weeks. 2.) Pt will have improved shoulder range of motion by degrees in 2-4 weeks. 3.) Pt will have improved shoulder strength by 1/2 MMT without pain in 4-6 weeks. 4.) Pt will have decreased tenderness to palpation to upper trapezius/levator scapulae by 50% in 3-4 weeks. Functional Goals:  1.) Pt will be independent in their home exercise program in 1 week. 2.) Pt will be able to complete reaching motions forward without pain in 4-6 weeks. 3.) Pt will be able to complete all ADLs without pain in 6-8 weeks. 4.) Pt will have an improved FOTO score of 75/100 in 6-8 weeks. 5.) Pt will be able lift her dog (~15 lbs) without shoulder pain in 4-6 weeks.     Plan  Patient would benefit from: skilled PT  Planned therapy interventions: joint mobilization, body mechanics training, balance, patient education, therapeutic exercise, home exercise Problem: Falls - Risk of  Goal: *Absence of Falls  Description: Document Kade Thomson Fall Risk and appropriate interventions in the flowsheet. 6/26/2021 1121 by Akash Reyes RN  Outcome: Resolved/Met  Note: Fall Risk Interventions:  Mobility Interventions: Patient to call before getting OOB         Medication Interventions: Teach patient to arise slowly                6/26/2021 0916 by Akash Reyes RN  Outcome: Progressing Towards Goal  Note: Fall Risk Interventions:  Mobility Interventions: Patient to call before getting OOB         Medication Interventions: Teach patient to arise slowly                   Problem: Patient Education: Go to Patient Education Activity  Goal: Patient/Family Education  Outcome: Resolved/Met     Problem: Diabetes Self-Management  Goal: *Disease process and treatment process  Description: Define diabetes and identify own type of diabetes; list 3 options for treating diabetes. Outcome: Resolved/Met  Goal: *Incorporating nutritional management into lifestyle  Description: Describe effect of type, amount and timing of food on blood glucose; list 3 methods for planning meals. Outcome: Resolved/Met  Goal: *Incorporating physical activity into lifestyle  Description: State effect of exercise on blood glucose levels. Outcome: Resolved/Met  Goal: *Developing strategies to promote health/change behavior  Description: Define the ABC's of diabetes; identify appropriate screenings, schedule and personal plan for screenings. Outcome: Resolved/Met  Goal: *Using medications safely  Description: State effect of diabetes medications on diabetes; name diabetes medication taking, action and side effects. Outcome: Resolved/Met  Goal: *Monitoring blood glucose, interpreting and using results  Description: Identify recommended blood glucose targets  and personal targets.   Outcome: Resolved/Met  Goal: *Prevention, detection, treatment of acute complications  Description: List symptoms of hyper- and hypoglycemia; describe how to treat low blood sugar and actions for lowering  high blood glucose level. Outcome: Resolved/Met  Goal: *Prevention, detection and treatment of chronic complications  Description: Define the natural course of diabetes and describe the relationship of blood glucose levels to long term complications of diabetes. Outcome: Resolved/Met  Goal: *Developing strategies to address psychosocial issues  Description: Describe feelings about living with diabetes; identify support needed and support network  Outcome: Resolved/Met     Problem: Patient Education: Go to Patient Education Activity  Goal: Patient/Family Education  Outcome: Resolved/Met     Problem: Lower Extremity Wound Care  Goal: *Non-infected wound: Improvement of existing wound, absence of infection, and maintenance of skin integrity  Outcome: Resolved/Met  Goal: *Infected Wound: Prevention of further infection and promotion of healing  Description: Infection control procedures (eg: clean dressings, clean gloves, hand washing, precautions to isolate wound from contamination, sterile instruments used for wound debridement) should be implemented.   Outcome: Resolved/Met  Goal: Interventions  Outcome: Resolved/Met     Problem: Patient Education: Go to Patient Education Activity  Goal: Patient/Family Education  Outcome: Resolved/Met program, neuromuscular re-education, strengthening, graded activity and graded exercise  Frequency: 1x week  Duration in weeks: 8  Plan of Care beginning date: 2023  Plan of Care expiration date: 10/11/2023  Treatment plan discussed with: patient        Subjective Evaluation    History of Present Illness  Mechanism of injury:  Germán Sawyer is a 35year old female presenting to physical therapy with left shoulder pain. She explains that she is in her second trimester of pregnancy and has been sleeping on her left side at night. She believes this is what started her left shoulder pain, it has been going on for about 3 months. She explains that there is a lot of clicking in the left shoulder with movement, which is new, but this is not painful. She gets pain when she reaches forward away from her body and lifting >15 pounds. She denies radiating pain, it is described as tight and achy local to the shoulder. Her range of motion seems unaffected. Aggs: lifting laundry baskets, sleeping on left side,   Eases:  Patient Goals  Patient goals for therapy: decreased pain, increased motion and return to sport/leisure activities    Pain  Current pain ratin  At best pain ratin  At worst pain ratin  Quality: dull ache, sharp and tight  Progression: worsening          Objective     Static Posture     Shoulders  Rounded. Palpation   Left   Muscle spasm in the levator scapulae, rhomboids and upper trapezius. Tenderness of the rhomboids. Tenderness     Left Shoulder   Tenderness in the biceps tendon (proximal) and bicipital groove. No tenderness in the acromion, infraspinatus tendon, lateral scapula, medial scapula, subscapularis tendon and supraspinatus tendon.      Active Range of Motion   Left Shoulder   Normal active range of motion    Passive Range of Motion   Left Shoulder   External rotation 90°: 90 degrees   Internal rotation 45°: with pain  Internal rotation 90°: 45 degrees     Joint Play   Left Shoulder  Joints within functional limits are the anterior capsule, posterior capsule and inferior capsule. Strength/Myotome Testing     Left Shoulder     Planes of Motion   Flexion: 4 (P!)   Extension: 4+   Abduction: 4   Adduction: 4   External rotation at 0°: 4 (P!)   Internal rotation at 0°: 4+     Tests     Left Shoulder   Positive Hawkin's. Negative drop arm, empty can, Neer's, painful arc, Speed's and ULTT1.               Precautions: Pregnant      Manuals 8/16            STM L UT, nadine, shwethaator nv                                                   Neuro Re-Ed                                                                                                        Ther Ex             Sleeper s' 10x10''            Doorway pec s' 10x10''            UT/Lev s' 5x15''            Doorway rhomboid s' 5x15''                                                                Ther Activity                                       Gait Training                                       Modalities

## 2023-09-15 ENCOUNTER — OFFICE VISIT (OUTPATIENT)
Age: 63
End: 2023-09-15
Payer: COMMERCIAL

## 2023-09-15 VITALS
BODY MASS INDEX: 42.26 KG/M2 | HEIGHT: 72 IN | SYSTOLIC BLOOD PRESSURE: 130 MMHG | DIASTOLIC BLOOD PRESSURE: 72 MMHG | HEART RATE: 67 BPM | WEIGHT: 312 LBS

## 2023-09-15 DIAGNOSIS — Z79.4 TYPE 2 DIABETES MELLITUS WITH HYPERGLYCEMIA, WITH LONG-TERM CURRENT USE OF INSULIN (HCC): Primary | ICD-10-CM

## 2023-09-15 DIAGNOSIS — E11.65 TYPE 2 DIABETES MELLITUS WITH HYPERGLYCEMIA, WITH LONG-TERM CURRENT USE OF INSULIN (HCC): Primary | ICD-10-CM

## 2023-09-15 LAB — HBA1C MFR BLD: 8.8 %

## 2023-09-15 PROCEDURE — 83036 HEMOGLOBIN GLYCOSYLATED A1C: CPT | Performed by: INTERNAL MEDICINE

## 2023-09-15 PROCEDURE — 99204 OFFICE O/P NEW MOD 45 MIN: CPT | Performed by: INTERNAL MEDICINE

## 2023-09-15 RX ORDER — FUROSEMIDE 40 MG/1
40 TABLET ORAL 2 TIMES DAILY
COMMUNITY
Start: 2023-08-30

## 2023-09-15 RX ORDER — LOSARTAN POTASSIUM 50 MG/1
50 TABLET ORAL DAILY
COMMUNITY

## 2023-09-15 RX ORDER — GABAPENTIN 100 MG/1
CAPSULE ORAL DAILY PRN
COMMUNITY

## 2023-09-15 NOTE — PROGRESS NOTES
74 units  2. Continue the Humalog 20 units  3. I will order a freestyle Jonah Ana so he can monitor his blood sugar  4. I encouraged him to increase the Ozempic to 0.375 mg  4. I will see him back in 3 months    Please note that this dictation was completed with Globe[employer], the computer voice recognition software. Quite often unanticipated grammatical, syntax, homophones, and other interpretive errors are inadvertently transcribed by the computer software. Please disregard these errors. Please excuse any errors that have escaped final proofreading.

## 2024-03-08 ENCOUNTER — HOSPITAL ENCOUNTER (OUTPATIENT)
Facility: HOSPITAL | Age: 64
Discharge: HOME | End: 2024-03-08
Payer: COMMERCIAL

## 2024-03-08 DIAGNOSIS — F17.210 CIGARETTE NICOTINE DEPENDENCE, UNCOMPLICATED: ICD-10-CM

## 2024-03-08 DIAGNOSIS — Z12.2 ENCOUNTER FOR SCREENING FOR MALIGNANT NEOPLASM OF RESPIRATORY ORGANS: ICD-10-CM

## 2024-03-08 PROCEDURE — 71271 CT THORAX LUNG CANCER SCR C-: CPT

## 2024-03-20 ENCOUNTER — HOSPITAL ENCOUNTER (INPATIENT)
Facility: HOSPITAL | Age: 64
LOS: 4 days | Discharge: HOME | DRG: 309 | End: 2024-03-24
Attending: STUDENT IN AN ORGANIZED HEALTH CARE EDUCATION/TRAINING PROGRAM | Admitting: STUDENT IN AN ORGANIZED HEALTH CARE EDUCATION/TRAINING PROGRAM
Payer: COMMERCIAL

## 2024-03-20 DIAGNOSIS — I48.91 A-FIB (HCC): ICD-10-CM

## 2024-03-20 DIAGNOSIS — I48.92 ATRIAL FLUTTER WITH RAPID VENTRICULAR RESPONSE (HCC): ICD-10-CM

## 2024-03-20 DIAGNOSIS — E66.01 OBESITY, MORBID (HCC): Primary | ICD-10-CM

## 2024-03-20 LAB
ALBUMIN SERPL-MCNC: 3.6 G/DL (ref 3.5–5)
ALBUMIN/GLOB SERPL: 0.8 (ref 1.1–2.2)
ALP SERPL-CCNC: 118 U/L (ref 45–117)
ALT SERPL-CCNC: 21 U/L (ref 12–78)
ANION GAP SERPL CALC-SCNC: 5 MMOL/L (ref 5–15)
AST SERPL-CCNC: 9 U/L (ref 15–37)
BASOPHILS # BLD: 0.1 K/UL (ref 0–0.1)
BASOPHILS NFR BLD: 1 % (ref 0–1)
BILIRUB SERPL-MCNC: 0.5 MG/DL (ref 0.2–1)
BUN SERPL-MCNC: 34 MG/DL (ref 6–20)
BUN/CREAT SERPL: 15 (ref 12–20)
CALCIUM SERPL-MCNC: 9.6 MG/DL (ref 8.5–10.1)
CHLORIDE SERPL-SCNC: 103 MMOL/L (ref 97–108)
CO2 SERPL-SCNC: 28 MMOL/L (ref 21–32)
CREAT SERPL-MCNC: 2.21 MG/DL (ref 0.7–1.3)
DIFFERENTIAL METHOD BLD: NORMAL
EKG ATRIAL RATE: 278 BPM
EKG DIAGNOSIS: NORMAL
EKG P AXIS: 268 DEGREES
EKG Q-T INTERVAL: 278 MS
EKG QRS DURATION: 80 MS
EKG QTC CALCULATION (BAZETT): 435 MS
EKG R AXIS: 65 DEGREES
EKG T AXIS: -32 DEGREES
EKG VENTRICULAR RATE: 147 BPM
EOSINOPHIL # BLD: 0.4 K/UL (ref 0–0.4)
EOSINOPHIL NFR BLD: 4 % (ref 0–7)
ERYTHROCYTE [DISTWIDTH] IN BLOOD BY AUTOMATED COUNT: 13.8 % (ref 11.5–14.5)
GLOBULIN SER CALC-MCNC: 4.6 G/DL (ref 2–4)
GLUCOSE BLD STRIP.AUTO-MCNC: 70 MG/DL (ref 65–117)
GLUCOSE SERPL-MCNC: 107 MG/DL (ref 65–100)
HCT VFR BLD AUTO: 48.1 % (ref 36.6–50.3)
HGB BLD-MCNC: 14.7 G/DL (ref 12.1–17)
IMM GRANULOCYTES # BLD AUTO: 0 K/UL (ref 0–0.04)
IMM GRANULOCYTES NFR BLD AUTO: 0 % (ref 0–0.5)
LYMPHOCYTES # BLD: 2 K/UL (ref 0.8–3.5)
LYMPHOCYTES NFR BLD: 22 % (ref 12–49)
MAGNESIUM SERPL-MCNC: 1.6 MG/DL (ref 1.6–2.4)
MCH RBC QN AUTO: 28.3 PG (ref 26–34)
MCHC RBC AUTO-ENTMCNC: 30.6 G/DL (ref 30–36.5)
MCV RBC AUTO: 92.7 FL (ref 80–99)
MONOCYTES # BLD: 0.8 K/UL (ref 0–1)
MONOCYTES NFR BLD: 8 % (ref 5–13)
NEUTS SEG # BLD: 6 K/UL (ref 1.8–8)
NEUTS SEG NFR BLD: 65 % (ref 32–75)
NRBC # BLD: 0 K/UL (ref 0–0.01)
NRBC BLD-RTO: 0 PER 100 WBC
NT PRO BNP: 449 PG/ML
PHOSPHATE SERPL-MCNC: 3.5 MG/DL (ref 2.6–4.7)
PLATELET # BLD AUTO: 212 K/UL (ref 150–400)
PMV BLD AUTO: 9.8 FL (ref 8.9–12.9)
POTASSIUM SERPL-SCNC: 4 MMOL/L (ref 3.5–5.1)
PROT SERPL-MCNC: 8.2 G/DL (ref 6.4–8.2)
RBC # BLD AUTO: 5.19 M/UL (ref 4.1–5.7)
SERVICE CMNT-IMP: NORMAL
SODIUM SERPL-SCNC: 136 MMOL/L (ref 136–145)
TROPONIN I SERPL HS-MCNC: 8 NG/L (ref 0–76)
TSH SERPL DL<=0.05 MIU/L-ACNC: 1.9 UIU/ML (ref 0.36–3.74)
WBC # BLD AUTO: 9.2 K/UL (ref 4.1–11.1)

## 2024-03-20 PROCEDURE — 99285 EMERGENCY DEPT VISIT HI MDM: CPT

## 2024-03-20 PROCEDURE — 2580000003 HC RX 258: Performed by: STUDENT IN AN ORGANIZED HEALTH CARE EDUCATION/TRAINING PROGRAM

## 2024-03-20 PROCEDURE — 2060000000 HC ICU INTERMEDIATE R&B

## 2024-03-20 PROCEDURE — 83880 ASSAY OF NATRIURETIC PEPTIDE: CPT

## 2024-03-20 PROCEDURE — 36415 COLL VENOUS BLD VENIPUNCTURE: CPT

## 2024-03-20 PROCEDURE — 6370000000 HC RX 637 (ALT 250 FOR IP): Performed by: INTERNAL MEDICINE

## 2024-03-20 PROCEDURE — 96366 THER/PROPH/DIAG IV INF ADDON: CPT

## 2024-03-20 PROCEDURE — 2580000003 HC RX 258: Performed by: INTERNAL MEDICINE

## 2024-03-20 PROCEDURE — 82962 GLUCOSE BLOOD TEST: CPT

## 2024-03-20 PROCEDURE — 84443 ASSAY THYROID STIM HORMONE: CPT

## 2024-03-20 PROCEDURE — 6360000002 HC RX W HCPCS: Performed by: STUDENT IN AN ORGANIZED HEALTH CARE EDUCATION/TRAINING PROGRAM

## 2024-03-20 PROCEDURE — 84484 ASSAY OF TROPONIN QUANT: CPT

## 2024-03-20 PROCEDURE — 96365 THER/PROPH/DIAG IV INF INIT: CPT

## 2024-03-20 PROCEDURE — 96376 TX/PRO/DX INJ SAME DRUG ADON: CPT

## 2024-03-20 PROCEDURE — 96375 TX/PRO/DX INJ NEW DRUG ADDON: CPT

## 2024-03-20 PROCEDURE — 80053 COMPREHEN METABOLIC PANEL: CPT

## 2024-03-20 PROCEDURE — 96361 HYDRATE IV INFUSION ADD-ON: CPT

## 2024-03-20 PROCEDURE — 93005 ELECTROCARDIOGRAM TRACING: CPT

## 2024-03-20 PROCEDURE — 84100 ASSAY OF PHOSPHORUS: CPT

## 2024-03-20 PROCEDURE — 2500000003 HC RX 250 WO HCPCS: Performed by: STUDENT IN AN ORGANIZED HEALTH CARE EDUCATION/TRAINING PROGRAM

## 2024-03-20 PROCEDURE — 83735 ASSAY OF MAGNESIUM: CPT

## 2024-03-20 PROCEDURE — 85025 COMPLETE CBC W/AUTO DIFF WBC: CPT

## 2024-03-20 RX ORDER — SODIUM CHLORIDE 0.9 % (FLUSH) 0.9 %
5-40 SYRINGE (ML) INJECTION EVERY 12 HOURS SCHEDULED
Status: DISCONTINUED | OUTPATIENT
Start: 2024-03-20 | End: 2024-03-24 | Stop reason: HOSPADM

## 2024-03-20 RX ORDER — INSULIN LISPRO 100 [IU]/ML
0-8 INJECTION, SOLUTION INTRAVENOUS; SUBCUTANEOUS
Status: DISCONTINUED | OUTPATIENT
Start: 2024-03-20 | End: 2024-03-24 | Stop reason: HOSPADM

## 2024-03-20 RX ORDER — ACETAMINOPHEN 325 MG/1
650 TABLET ORAL EVERY 6 HOURS PRN
Status: DISCONTINUED | OUTPATIENT
Start: 2024-03-20 | End: 2024-03-24 | Stop reason: HOSPADM

## 2024-03-20 RX ORDER — 0.9 % SODIUM CHLORIDE 0.9 %
500 INTRAVENOUS SOLUTION INTRAVENOUS ONCE
Status: COMPLETED | OUTPATIENT
Start: 2024-03-20 | End: 2024-03-20

## 2024-03-20 RX ORDER — ENOXAPARIN SODIUM 150 MG/ML
1 INJECTION SUBCUTANEOUS 2 TIMES DAILY
Status: COMPLETED | OUTPATIENT
Start: 2024-03-20 | End: 2024-03-22

## 2024-03-20 RX ORDER — ATORVASTATIN CALCIUM 40 MG/1
40 TABLET, FILM COATED ORAL DAILY
Status: DISCONTINUED | OUTPATIENT
Start: 2024-03-21 | End: 2024-03-24 | Stop reason: HOSPADM

## 2024-03-20 RX ORDER — ACETAMINOPHEN 650 MG/1
650 SUPPOSITORY RECTAL EVERY 6 HOURS PRN
Status: DISCONTINUED | OUTPATIENT
Start: 2024-03-20 | End: 2024-03-24 | Stop reason: HOSPADM

## 2024-03-20 RX ORDER — MAGNESIUM SULFATE IN WATER 40 MG/ML
2000 INJECTION, SOLUTION INTRAVENOUS PRN
Status: DISCONTINUED | OUTPATIENT
Start: 2024-03-20 | End: 2024-03-21

## 2024-03-20 RX ORDER — POTASSIUM CHLORIDE 20 MEQ/1
40 TABLET, EXTENDED RELEASE ORAL PRN
Status: DISCONTINUED | OUTPATIENT
Start: 2024-03-20 | End: 2024-03-21

## 2024-03-20 RX ORDER — POTASSIUM CHLORIDE 7.45 MG/ML
10 INJECTION INTRAVENOUS PRN
Status: DISCONTINUED | OUTPATIENT
Start: 2024-03-20 | End: 2024-03-21

## 2024-03-20 RX ORDER — ONDANSETRON 2 MG/ML
4 INJECTION INTRAMUSCULAR; INTRAVENOUS EVERY 6 HOURS PRN
Status: DISCONTINUED | OUTPATIENT
Start: 2024-03-20 | End: 2024-03-24 | Stop reason: HOSPADM

## 2024-03-20 RX ORDER — SODIUM CHLORIDE 0.9 % (FLUSH) 0.9 %
5-40 SYRINGE (ML) INJECTION PRN
Status: DISCONTINUED | OUTPATIENT
Start: 2024-03-20 | End: 2024-03-24 | Stop reason: HOSPADM

## 2024-03-20 RX ORDER — POLYETHYLENE GLYCOL 3350 17 G/17G
17 POWDER, FOR SOLUTION ORAL DAILY PRN
Status: DISCONTINUED | OUTPATIENT
Start: 2024-03-20 | End: 2024-03-24 | Stop reason: HOSPADM

## 2024-03-20 RX ORDER — ONDANSETRON 2 MG/ML
4 INJECTION INTRAMUSCULAR; INTRAVENOUS EVERY 4 HOURS PRN
Status: DISCONTINUED | OUTPATIENT
Start: 2024-03-20 | End: 2024-03-21

## 2024-03-20 RX ORDER — ENOXAPARIN SODIUM 150 MG/ML
1 INJECTION SUBCUTANEOUS 2 TIMES DAILY
Status: DISCONTINUED | OUTPATIENT
Start: 2024-03-20 | End: 2024-03-20

## 2024-03-20 RX ORDER — INSULIN GLARGINE 100 [IU]/ML
40 INJECTION, SOLUTION SUBCUTANEOUS NIGHTLY
Status: DISCONTINUED | OUTPATIENT
Start: 2024-03-20 | End: 2024-03-24 | Stop reason: HOSPADM

## 2024-03-20 RX ORDER — INSULIN LISPRO 100 [IU]/ML
0-4 INJECTION, SOLUTION INTRAVENOUS; SUBCUTANEOUS NIGHTLY
Status: DISCONTINUED | OUTPATIENT
Start: 2024-03-20 | End: 2024-03-24 | Stop reason: HOSPADM

## 2024-03-20 RX ORDER — INSULIN GLARGINE 100 [IU]/ML
20 INJECTION, SOLUTION SUBCUTANEOUS NIGHTLY
Status: DISCONTINUED | OUTPATIENT
Start: 2024-03-20 | End: 2024-03-20

## 2024-03-20 RX ORDER — 0.9 % SODIUM CHLORIDE 0.9 %
500 INTRAVENOUS SOLUTION INTRAVENOUS ONCE
Status: DISCONTINUED | OUTPATIENT
Start: 2024-03-20 | End: 2024-03-24 | Stop reason: HOSPADM

## 2024-03-20 RX ORDER — ASPIRIN 81 MG/1
81 TABLET, CHEWABLE ORAL DAILY
Status: DISCONTINUED | OUTPATIENT
Start: 2024-03-21 | End: 2024-03-24 | Stop reason: HOSPADM

## 2024-03-20 RX ORDER — DILTIAZEM HYDROCHLORIDE 5 MG/ML
10 INJECTION INTRAVENOUS
Status: COMPLETED | OUTPATIENT
Start: 2024-03-20 | End: 2024-03-20

## 2024-03-20 RX ORDER — OXYCODONE HYDROCHLORIDE 5 MG/1
5 TABLET ORAL EVERY 4 HOURS PRN
Status: DISCONTINUED | OUTPATIENT
Start: 2024-03-20 | End: 2024-03-24 | Stop reason: HOSPADM

## 2024-03-20 RX ORDER — SODIUM CHLORIDE 9 MG/ML
INJECTION, SOLUTION INTRAVENOUS CONTINUOUS
Status: DISCONTINUED | OUTPATIENT
Start: 2024-03-20 | End: 2024-03-22

## 2024-03-20 RX ORDER — SODIUM CHLORIDE 9 MG/ML
INJECTION, SOLUTION INTRAVENOUS PRN
Status: DISCONTINUED | OUTPATIENT
Start: 2024-03-20 | End: 2024-03-24 | Stop reason: HOSPADM

## 2024-03-20 RX ORDER — ONDANSETRON 4 MG/1
4 TABLET, ORALLY DISINTEGRATING ORAL EVERY 8 HOURS PRN
Status: DISCONTINUED | OUTPATIENT
Start: 2024-03-20 | End: 2024-03-24 | Stop reason: HOSPADM

## 2024-03-20 RX ORDER — ACETAMINOPHEN 325 MG/1
650 TABLET ORAL EVERY 4 HOURS PRN
Status: DISCONTINUED | OUTPATIENT
Start: 2024-03-20 | End: 2024-03-22

## 2024-03-20 RX ORDER — 0.9 % SODIUM CHLORIDE 0.9 %
250 INTRAVENOUS SOLUTION INTRAVENOUS ONCE
Status: COMPLETED | OUTPATIENT
Start: 2024-03-21 | End: 2024-03-21

## 2024-03-20 RX ADMIN — SODIUM CHLORIDE, PRESERVATIVE FREE 10 ML: 5 INJECTION INTRAVENOUS at 23:49

## 2024-03-20 RX ADMIN — SODIUM CHLORIDE 500 ML: 9 INJECTION, SOLUTION INTRAVENOUS at 13:40

## 2024-03-20 RX ADMIN — SODIUM CHLORIDE 250 ML: 9 INJECTION, SOLUTION INTRAVENOUS at 23:48

## 2024-03-20 RX ADMIN — DILTIAZEM HYDROCHLORIDE 10 MG: 5 INJECTION, SOLUTION INTRAVENOUS at 13:39

## 2024-03-20 RX ADMIN — AMIODARONE HYDROCHLORIDE 1 MG/MIN: 50 INJECTION, SOLUTION INTRAVENOUS at 18:02

## 2024-03-20 RX ADMIN — METOPROLOL TARTRATE 25 MG: 25 TABLET, FILM COATED ORAL at 23:43

## 2024-03-20 RX ADMIN — AMIODARONE HYDROCHLORIDE 150 MG: 1.5 INJECTION, SOLUTION INTRAVENOUS at 17:42

## 2024-03-20 RX ADMIN — SODIUM CHLORIDE 5 MG/HR: 900 INJECTION, SOLUTION INTRAVENOUS at 14:54

## 2024-03-20 ASSESSMENT — PAIN SCALES - GENERAL: PAINLEVEL_OUTOF10: 0

## 2024-03-20 NOTE — ED PROVIDER NOTES
[facility] EMERGENCY DEPT  EMERGENCY DEPARTMENT ENCOUNTER       Pt Name: Mahin Araya  MRN: [id number]  Birthdate 1960  Date of evaluation: 3/20/2024  Provider: Joe Shoemaker MD   PCP: Martin Woods MD  Note Started: 1:26 PM 3/20/24     CHIEF COMPLAINT       Chief Complaint   Patient presents with    Tachycardia     Pt sent from kidney specialist where he was for routine appt when it was noted his HR was elevated to 140s        HISTORY OF PRESENT ILLNESS: 1 or more elements      History From: Patient  None     Mahin Araya is a 63 y.o. male who presents to the ED with tachycardia noted by nephrologist. Patient reports some CRAWFORD but otherwise denies SOB, CP, fatigue, syncope, lightheadedness. Denies prior hx of afib or aflutter. Denies recent fevers, ETOH, increased leg swelling  (has chronic LE edema).      Nursing Notes were all reviewed and agreed with or any disagreements were addressed in the HPI.     REVIEW OF SYSTEMS      Review of Systems     Positives and Pertinent negatives as per HPI.    PAST HISTORY     Past Medical History:  Past Medical History:   Diagnosis Date    Cellulitis of left foot     DM (diabetes mellitus) (HCC)     HTN (hypertension)     Iliac artery stenosis, left (HCC)     Lymphedema of both lower extremities     Smoker          Past Surgical History:  No past surgical history on file.    Family History:  No family history on file.    Social History:  Social History     Tobacco Use    Smoking status: Every Day     Current packs/day: 1.00     Types: Cigarettes    Smokeless tobacco: Never   Substance Use Topics    Alcohol use: Yes     Alcohol/week: 4.0 standard drinks of alcohol    Drug use: Never       Allergies:  No Known Allergies    CURRENT MEDICATIONS      Previous Medications    ASPIRIN 81 MG CHEWABLE TABLET    Take 1 tablet by mouth daily    ATENOLOL (TENORMIN) 100 MG TABLET    Take 1 tablet by mouth daily    ATORVASTATIN (LIPITOR) 40 MG TABLET    Take 1 tablet by mouth daily

## 2024-03-20 NOTE — H&P
Hospitalist Admission Note    NAME:   Mahin Araya   : 1960   MRN: [id number]     Date/Time: 3/20/2024 5:33 PM    Patient PCP: Martin Woods MD    ______________________________________________________________________  Given the patient's current clinical presentation, I have a high level of concern for decompensation if discharged from the emergency department.  Complex decision making was performed, which includes reviewing the patient's available past medical records, laboratory results, and x-ray films.       My assessment of this patient's clinical condition and my plan of care is as follows.    Assessment / Plan:    New onset A-fib/a flutter  Admit to PCU  Given diltiazem push  Then started on Cardizem drip  Heart rate is still at 140  Cardio consult  TSH WNL  [id number] Score is 3  Will give 1 dose of therapeutic Lovenox  Defect continuing of AC for cardiology  Echo  Update 5:40 pm  Patient blood pressure dropping  Will stop Cardizem drip  Started on amiodarone drip  Patient creatinine elevated, will get V/Q to rule out PE  Currently started on AC as above  Low threshold for ICU transfer      LAURA on CKD  Gentle IV fluid hydration follow with [facility]  Patient follow-up with his nephro as outpatient  If worsening consult nephro    DM  Hold home medication  Started on Lantus for basal coverage  Lispro sliding scale      HTN  Blood pressure is borderline with Cardizem drip  Hold home medications      Medical Decision Making:   I personally reviewed labs: yes  I personally reviewed imaging:yes   I personally reviewed EKG:  Toxic drug monitoring: yes  Discussed case with: ED provider. After discussion I am in agreement that acuity of patient's medical condition necessitates hospital stay.      Code Status: Full code  DVT Prophylaxis: Lovenox   Baseline:     Subjective:   CHIEF COMPLAINT: Sent for elevated heart rate    HISTORY OF PRESENT ILLNESS:     Mahin Araya is a 63 y.o.  male with PMHx

## 2024-03-21 ENCOUNTER — APPOINTMENT (OUTPATIENT)
Facility: HOSPITAL | Age: 64
DRG: 309 | End: 2024-03-21
Payer: COMMERCIAL

## 2024-03-21 ENCOUNTER — APPOINTMENT (OUTPATIENT)
Facility: HOSPITAL | Age: 64
DRG: 309 | End: 2024-03-21
Attending: STUDENT IN AN ORGANIZED HEALTH CARE EDUCATION/TRAINING PROGRAM
Payer: COMMERCIAL

## 2024-03-21 LAB
ANION GAP SERPL CALC-SCNC: 5 MMOL/L (ref 5–15)
BASOPHILS # BLD: 0 K/UL (ref 0–0.1)
BASOPHILS NFR BLD: 1 % (ref 0–1)
BUN SERPL-MCNC: 32 MG/DL (ref 6–20)
BUN/CREAT SERPL: 16 (ref 12–20)
CALCIUM SERPL-MCNC: 8.8 MG/DL (ref 8.5–10.1)
CHLORIDE SERPL-SCNC: 107 MMOL/L (ref 97–108)
CO2 SERPL-SCNC: 24 MMOL/L (ref 21–32)
CREAT SERPL-MCNC: 2.02 MG/DL (ref 0.7–1.3)
DIFFERENTIAL METHOD BLD: ABNORMAL
ECHO AO ASC DIAM: 3.5 CM
ECHO AO ASCENDING AORTA INDEX: 1.35 CM/M2
ECHO AO ROOT DIAM: 4.2 CM
ECHO AO ROOT INDEX: 1.62 CM/M2
ECHO AV AREA PEAK VELOCITY: 3.9 CM2
ECHO AV AREA/BSA PEAK VELOCITY: 1.5 CM2/M2
ECHO AV PEAK GRADIENT: 4 MMHG
ECHO AV PEAK VELOCITY: 0.9 M/S
ECHO AV VELOCITY RATIO: 0.89
ECHO BSA: 2.71 M2
ECHO LA DIAMETER INDEX: 1.85 CM/M2
ECHO LA DIAMETER: 4.8 CM
ECHO LA TO AORTIC ROOT RATIO: 1.14
ECHO LV EDV A4C: 73 ML
ECHO LV EDV INDEX A4C: 28 ML/M2
ECHO LV EJECTION FRACTION A4C: 33 %
ECHO LV ESV A4C: 49 ML
ECHO LV ESV INDEX A4C: 19 ML/M2
ECHO LV FRACTIONAL SHORTENING: 29 % (ref 28–44)
ECHO LV INTERNAL DIMENSION DIASTOLE INDEX: 2 CM/M2
ECHO LV INTERNAL DIMENSION DIASTOLIC: 5.2 CM (ref 4.2–5.9)
ECHO LV INTERNAL DIMENSION SYSTOLIC INDEX: 1.42 CM/M2
ECHO LV INTERNAL DIMENSION SYSTOLIC: 3.7 CM
ECHO LV IVSD: 1.3 CM (ref 0.6–1)
ECHO LV MASS 2D: 263.4 G (ref 88–224)
ECHO LV MASS INDEX 2D: 101.3 G/M2 (ref 49–115)
ECHO LV POSTERIOR WALL DIASTOLIC: 1.2 CM (ref 0.6–1)
ECHO LV RELATIVE WALL THICKNESS RATIO: 0.46
ECHO LVOT AREA: 4.5 CM2
ECHO LVOT DIAM: 2.4 CM
ECHO LVOT PEAK GRADIENT: 3 MMHG
ECHO LVOT PEAK VELOCITY: 0.8 M/S
ECHO PV MAX VELOCITY: 0.7 M/S
ECHO PV PEAK GRADIENT: 2 MMHG
ECHO RV FREE WALL PEAK S': 9 CM/S
ECHO RV TAPSE: 1.3 CM (ref 1.7–?)
EOSINOPHIL # BLD: 0.5 K/UL (ref 0–0.4)
EOSINOPHIL NFR BLD: 6 % (ref 0–7)
ERYTHROCYTE [DISTWIDTH] IN BLOOD BY AUTOMATED COUNT: 13.8 % (ref 11.5–14.5)
GLUCOSE BLD STRIP.AUTO-MCNC: 150 MG/DL (ref 65–117)
GLUCOSE BLD STRIP.AUTO-MCNC: 183 MG/DL (ref 65–117)
GLUCOSE BLD STRIP.AUTO-MCNC: 184 MG/DL (ref 65–117)
GLUCOSE BLD STRIP.AUTO-MCNC: 91 MG/DL (ref 65–117)
GLUCOSE SERPL-MCNC: 76 MG/DL (ref 65–100)
HCT VFR BLD AUTO: 40.9 % (ref 36.6–50.3)
HGB BLD-MCNC: 12.4 G/DL (ref 12.1–17)
IMM GRANULOCYTES # BLD AUTO: 0 K/UL (ref 0–0.04)
IMM GRANULOCYTES NFR BLD AUTO: 0 % (ref 0–0.5)
LYMPHOCYTES # BLD: 1.9 K/UL (ref 0.8–3.5)
LYMPHOCYTES NFR BLD: 24 % (ref 12–49)
MCH RBC QN AUTO: 27.7 PG (ref 26–34)
MCHC RBC AUTO-ENTMCNC: 30.3 G/DL (ref 30–36.5)
MCV RBC AUTO: 91.5 FL (ref 80–99)
MONOCYTES # BLD: 0.7 K/UL (ref 0–1)
MONOCYTES NFR BLD: 9 % (ref 5–13)
NEUTS SEG # BLD: 4.8 K/UL (ref 1.8–8)
NEUTS SEG NFR BLD: 60 % (ref 32–75)
NRBC # BLD: 0 K/UL (ref 0–0.01)
NRBC BLD-RTO: 0 PER 100 WBC
PLATELET # BLD AUTO: 167 K/UL (ref 150–400)
PMV BLD AUTO: 9.6 FL (ref 8.9–12.9)
POTASSIUM SERPL-SCNC: 3.8 MMOL/L (ref 3.5–5.1)
RBC # BLD AUTO: 4.47 M/UL (ref 4.1–5.7)
SERVICE CMNT-IMP: ABNORMAL
SERVICE CMNT-IMP: NORMAL
SODIUM SERPL-SCNC: 136 MMOL/L (ref 136–145)
WBC # BLD AUTO: 8 K/UL (ref 4.1–11.1)

## 2024-03-21 PROCEDURE — 82962 GLUCOSE BLOOD TEST: CPT

## 2024-03-21 PROCEDURE — 71045 X-RAY EXAM CHEST 1 VIEW: CPT

## 2024-03-21 PROCEDURE — 85025 COMPLETE CBC W/AUTO DIFF WBC: CPT

## 2024-03-21 PROCEDURE — A9540 TC99M MAA: HCPCS | Performed by: INTERNAL MEDICINE

## 2024-03-21 PROCEDURE — 6360000002 HC RX W HCPCS: Performed by: INTERNAL MEDICINE

## 2024-03-21 PROCEDURE — 6360000002 HC RX W HCPCS: Performed by: STUDENT IN AN ORGANIZED HEALTH CARE EDUCATION/TRAINING PROGRAM

## 2024-03-21 PROCEDURE — 6370000000 HC RX 637 (ALT 250 FOR IP): Performed by: INTERNAL MEDICINE

## 2024-03-21 PROCEDURE — 2060000000 HC ICU INTERMEDIATE R&B

## 2024-03-21 PROCEDURE — 2580000003 HC RX 258: Performed by: STUDENT IN AN ORGANIZED HEALTH CARE EDUCATION/TRAINING PROGRAM

## 2024-03-21 PROCEDURE — 6360000004 HC RX CONTRAST MEDICATION: Performed by: INTERNAL MEDICINE

## 2024-03-21 PROCEDURE — 36415 COLL VENOUS BLD VENIPUNCTURE: CPT

## 2024-03-21 PROCEDURE — 3430000000 HC RX DIAGNOSTIC RADIOPHARMACEUTICAL: Performed by: INTERNAL MEDICINE

## 2024-03-21 PROCEDURE — 6370000000 HC RX 637 (ALT 250 FOR IP): Performed by: STUDENT IN AN ORGANIZED HEALTH CARE EDUCATION/TRAINING PROGRAM

## 2024-03-21 PROCEDURE — 78580 LUNG PERFUSION IMAGING: CPT

## 2024-03-21 PROCEDURE — C8929 TTE W OR WO FOL WCON,DOPPLER: HCPCS

## 2024-03-21 PROCEDURE — 2580000003 HC RX 258: Performed by: INTERNAL MEDICINE

## 2024-03-21 PROCEDURE — 80048 BASIC METABOLIC PNL TOTAL CA: CPT

## 2024-03-21 RX ADMIN — AMIODARONE HYDROCHLORIDE 1 MG/MIN: 50 INJECTION, SOLUTION INTRAVENOUS at 00:24

## 2024-03-21 RX ADMIN — METOPROLOL TARTRATE 25 MG: 25 TABLET, FILM COATED ORAL at 22:33

## 2024-03-21 RX ADMIN — AMIODARONE HYDROCHLORIDE 1 MG/MIN: 50 INJECTION, SOLUTION INTRAVENOUS at 08:39

## 2024-03-21 RX ADMIN — AMIODARONE HYDROCHLORIDE 1 MG/MIN: 50 INJECTION, SOLUTION INTRAVENOUS at 19:25

## 2024-03-21 RX ADMIN — ENOXAPARIN SODIUM 150 MG: 150 INJECTION SUBCUTANEOUS at 00:21

## 2024-03-21 RX ADMIN — ENOXAPARIN SODIUM 150 MG: 150 INJECTION SUBCUTANEOUS at 08:47

## 2024-03-21 RX ADMIN — METOPROLOL TARTRATE 25 MG: 25 TABLET, FILM COATED ORAL at 17:15

## 2024-03-21 RX ADMIN — KIT FOR THE PREPARATION OF TECHNETIUM TC 99M ALBUMIN AGGREGATED 4.2 MILLICURIE: 2.5 INJECTION, POWDER, FOR SOLUTION INTRAVENOUS at 13:10

## 2024-03-21 RX ADMIN — SODIUM CHLORIDE: 9 INJECTION, SOLUTION INTRAVENOUS at 00:43

## 2024-03-21 RX ADMIN — ATORVASTATIN CALCIUM 40 MG: 40 TABLET, FILM COATED ORAL at 08:47

## 2024-03-21 RX ADMIN — ENOXAPARIN SODIUM 150 MG: 150 INJECTION SUBCUTANEOUS at 22:33

## 2024-03-21 RX ADMIN — SODIUM CHLORIDE: 9 INJECTION, SOLUTION INTRAVENOUS at 19:26

## 2024-03-21 RX ADMIN — SODIUM CHLORIDE, PRESERVATIVE FREE 10 ML: 5 INJECTION INTRAVENOUS at 08:48

## 2024-03-21 RX ADMIN — PERFLUTREN 1.5 ML: 6.52 INJECTION, SUSPENSION INTRAVENOUS at 13:46

## 2024-03-21 RX ADMIN — ASPIRIN 81 MG: 81 TABLET, CHEWABLE ORAL at 08:47

## 2024-03-21 ASSESSMENT — PAIN SCALES - GENERAL
PAINLEVEL_OUTOF10: 0

## 2024-03-21 NOTE — PROGRESS NOTES
Patient transferred to the floor at 2020 on 3/20/23. Patient received on a amiodarone drip. He reports that he was pain free. Admission completed and patient settled into bed. He has his wife and fathe-in law at bedside.

## 2024-03-21 NOTE — PROGRESS NOTES
Hospitalist Progress Note    NAME:   Mahin Araya   : 1960   MRN: [id number]     Date/Time: 3/21/2024 2:36 PM  Patient PCP: Martin Woods MD    Estimated discharge date: 3/23  Barriers: Improvement of heart rate, cardiology clearance      Assessment / Plan:    New onset atrial fibrillation with rapid ventricular rate  Borderline low blood pressure  -Patient was started on Cardizem drip but blood pressure was borderline low so started on amiodarone drip  -Potassium is 3.8 and will replace.  Will check magnesium.  -TSH is normal  -Pending 2D echocardiogram  -Cardiology consulted and waiting on recommendations.  -Check CBC, BMP and magnesium in a.m. tomorrow  -pending VQ scan    CKD stage III  -Baseline creatinine is around 1.2-1.8.  Currently creatinine is 2.  Creatinine peaked at 2.2.  Check BMP in AM.  Avoid nephrotoxic medications.    Diabetes mellitus type 2  -Continue Lantus 40 units nightly along with insulin sliding scale and blood sugar checks    Hypertension  Dyslipidemia  Diabetic neuropathy  -Holding all antihypertensives due to borderline low blood pressure  -Continue PTA statin  -Continue PTA gabapentin    3 mm lung nodule on CT on 3/8  -Will need repeat CT in 1 year    Medical Decision Making:   I personally reviewed labs: CBC, BMP  I personally reviewed imaging:  I personally reviewed EKG: Telemetry monitoring  Toxic drug monitoring: Monitor heart rate while on amiodarone drip  Discussed case with: Pharmacy,  and nursing        Code Status: Full code  DVT Prophylaxis: Lovenox  GI Prophylaxis:    Subjective:     Chief Complaint / Reason for Physician Visit  Does not report chest pain or sob  Denies palpations  Blood pressure borderline low overnight but now improved  Still tachycardic with A-fib with RVR    Objective:     VITALS:   Last 24hrs VS reviewed since prior progress note. Most recent are:  Patient Vitals for the past 24 hrs:   BP Temp Temp src Pulse Resp SpO2

## 2024-03-21 NOTE — PROGRESS NOTES
End of Shift Note    Bedside shift change report given to JOANN (oncoming nurse) by Elgin Marquez RN (offgoing nurse).  Report included the following information SBAR    Shift worked:  [id number]     Shift summary and any significant changes:     patient ADMIT FROM ED IN a FLUTTER. HE  TRANSFERRED ON AN amiodarone DRIP.ALL EVENTS OVERNIGHT HAS BEEN DOCUMENTED. VITALS WNL. HE DENIES HAVING ANY PAIN.     Concerns for physician to address:  NO NEW ISSUES     [facility]   [phone]       Activity:     Number times ambulated in hallways past shift: 2  Number of times OOB to chair past shift: 2    Cardiac:   Cardiac Monitoring: Yes           Access:  Current line(s): PIV     Genitourinary:   Urinary status: voiding    Respiratory:      Chronic home O2 use?: N/A  Incentive spirometer at bedside: NO       GI:     Current diet:  ADULT DIET; Regular; 5 carb choices (75 gm/meal); Low Fat/Low Chol/High Fiber/JOLYNN  Passing flatus: YES  Tolerating current diet: YES       Pain Management:   Patient states pain is manageable on current regimen: YES    Skin:     Interventions: increase time out of bed    Patient Safety:  Fall Score:    Interventions: gripper socks       Length of Stay:  Expected LOS: 3  Actual LOS: 1      Elgin Marquez RN

## 2024-03-21 NOTE — PROGRESS NOTES
Patients HR INCREASE -160 BRIEFLY ON THREE SEPARATE OCCASION. HE BECAME HYPOTENSIVE. I CALLED THE CONSULTING CARDIOLOGIST. ORDERS WERE GIVEN TO GIVE A FLUID BOLUS  MLS SALINE,ADD 25 MG OF METOPROLOL AND THEN SCHEDULE 25 MG EVERY  6 HRS.I WAS ASLO INSTRUCTED TO CONTINUE AMIODERONE DRIP AT 1 MG/MIN.aLL ORDERS ADDED AND CARRIED OUT. THE PATIENTS BLOOD PRESSURE IMPROVED AND HIS HR REMAINED IN -139.

## 2024-03-21 NOTE — CARE COORDINATION
Care Management Initial Assessment       RUR: 11% Low  Readmission? No  1st IM letter given? No  1st  letter given: No    Chart reviewed. CM met with patient at bedside. Pt provides all his own ADLs, drives, and owns CPAP machine as well as 'lymphedema equipment'. The patient had HH in the past but can't recall which company. He denies SNF/IPR and has confirmed his PCP. He uses the [facility] in [place], [place] and will drive himself home at discharge. He has refused ACP doc conversation, however CM informed him that his adult children are 'next of kin' should decisions need to be made. Patient was satisfied with this. CM will continue to follow.     03/21/24 0077   Service Assessment   Patient Orientation Alert and Oriented   Cognition Alert   History Provided By Patient   Primary Caregiver Self   Support Systems Spouse/Significant Other  (Vanessa Alves [phone] girlfriend)   Patient's Healthcare Decision Maker is: Patient Declined (Legal Next of Kin Remains as Decision Maker)   PCP Verified by CM Yes   Last Visit to PCP Within last 3 months   Prior Functional Level Independent in ADLs/IADLs   Current Functional Level Independent in ADLs/IADLs   Can patient return to prior living arrangement Yes   Ability to make needs known: Good   Family able to assist with home care needs: Yes   Would you like for me to discuss the discharge plan with any other family members/significant others, and if so, who? No   Financial Resources None   Community Resources None   Social/Functional History   Lives With Significant other   Type of Home House   Home Layout One level   Home Access Stairs to enter with rails   Entrance Stairs - Number of Steps 3   Bathroom Toilet Standard   Bathroom Equipment None   Home Equipment   (CPAP)   Receives Help From Family   ADL Assistance Independent   Homemaking Assistance Independent   Ambulation Assistance Independent   Active  Yes  (will transport self)   Mode of Transportation

## 2024-03-22 ENCOUNTER — APPOINTMENT (OUTPATIENT)
Facility: HOSPITAL | Age: 64
DRG: 309 | End: 2024-03-22
Attending: INTERNAL MEDICINE
Payer: COMMERCIAL

## 2024-03-22 LAB
ANION GAP SERPL CALC-SCNC: 3 MMOL/L (ref 5–15)
BASOPHILS # BLD: 0.1 K/UL (ref 0–0.1)
BASOPHILS NFR BLD: 1 % (ref 0–1)
BUN SERPL-MCNC: 37 MG/DL (ref 6–20)
BUN/CREAT SERPL: 16 (ref 12–20)
CALCIUM SERPL-MCNC: 8.9 MG/DL (ref 8.5–10.1)
CHLORIDE SERPL-SCNC: 106 MMOL/L (ref 97–108)
CO2 SERPL-SCNC: 25 MMOL/L (ref 21–32)
CREAT SERPL-MCNC: 2.31 MG/DL (ref 0.7–1.3)
DIFFERENTIAL METHOD BLD: NORMAL
ECHO BSA: 2.71 M2
EOSINOPHIL # BLD: 0.3 K/UL (ref 0–0.4)
EOSINOPHIL NFR BLD: 4 % (ref 0–7)
ERYTHROCYTE [DISTWIDTH] IN BLOOD BY AUTOMATED COUNT: 13.6 % (ref 11.5–14.5)
GLUCOSE BLD STRIP.AUTO-MCNC: 133 MG/DL (ref 65–117)
GLUCOSE BLD STRIP.AUTO-MCNC: 146 MG/DL (ref 65–117)
GLUCOSE BLD STRIP.AUTO-MCNC: 158 MG/DL (ref 65–117)
GLUCOSE SERPL-MCNC: 172 MG/DL (ref 65–100)
HCT VFR BLD AUTO: 40 % (ref 36.6–50.3)
HGB BLD-MCNC: 12.4 G/DL (ref 12.1–17)
IMM GRANULOCYTES # BLD AUTO: 0 K/UL (ref 0–0.04)
IMM GRANULOCYTES NFR BLD AUTO: 0 % (ref 0–0.5)
LYMPHOCYTES # BLD: 1.6 K/UL (ref 0.8–3.5)
LYMPHOCYTES NFR BLD: 20 % (ref 12–49)
MAGNESIUM SERPL-MCNC: 1.8 MG/DL (ref 1.6–2.4)
MCH RBC QN AUTO: 28.6 PG (ref 26–34)
MCHC RBC AUTO-ENTMCNC: 31 G/DL (ref 30–36.5)
MCV RBC AUTO: 92.4 FL (ref 80–99)
MONOCYTES # BLD: 0.7 K/UL (ref 0–1)
MONOCYTES NFR BLD: 9 % (ref 5–13)
NEUTS SEG # BLD: 5.1 K/UL (ref 1.8–8)
NEUTS SEG NFR BLD: 66 % (ref 32–75)
NRBC # BLD: 0 K/UL (ref 0–0.01)
NRBC BLD-RTO: 0 PER 100 WBC
PHOSPHATE SERPL-MCNC: 3.5 MG/DL (ref 2.6–4.7)
PLATELET # BLD AUTO: 166 K/UL (ref 150–400)
PMV BLD AUTO: 10.1 FL (ref 8.9–12.9)
POTASSIUM SERPL-SCNC: 4.3 MMOL/L (ref 3.5–5.1)
RBC # BLD AUTO: 4.33 M/UL (ref 4.1–5.7)
SERVICE CMNT-IMP: ABNORMAL
SODIUM SERPL-SCNC: 134 MMOL/L (ref 136–145)
WBC # BLD AUTO: 7.8 K/UL (ref 4.1–11.1)

## 2024-03-22 PROCEDURE — 99153 MOD SED SAME PHYS/QHP EA: CPT

## 2024-03-22 PROCEDURE — 36415 COLL VENOUS BLD VENIPUNCTURE: CPT

## 2024-03-22 PROCEDURE — 82962 GLUCOSE BLOOD TEST: CPT

## 2024-03-22 PROCEDURE — B246ZZ4 ULTRASONOGRAPHY OF RIGHT AND LEFT HEART, TRANSESOPHAGEAL: ICD-10-PCS | Performed by: INTERNAL MEDICINE

## 2024-03-22 PROCEDURE — 80048 BASIC METABOLIC PNL TOTAL CA: CPT

## 2024-03-22 PROCEDURE — 6370000000 HC RX 637 (ALT 250 FOR IP): Performed by: INTERNAL MEDICINE

## 2024-03-22 PROCEDURE — 2580000003 HC RX 258: Performed by: INTERNAL MEDICINE

## 2024-03-22 PROCEDURE — 83735 ASSAY OF MAGNESIUM: CPT

## 2024-03-22 PROCEDURE — 85025 COMPLETE CBC W/AUTO DIFF WBC: CPT

## 2024-03-22 PROCEDURE — 99152 MOD SED SAME PHYS/QHP 5/>YRS: CPT

## 2024-03-22 PROCEDURE — 6370000000 HC RX 637 (ALT 250 FOR IP): Performed by: STUDENT IN AN ORGANIZED HEALTH CARE EDUCATION/TRAINING PROGRAM

## 2024-03-22 PROCEDURE — 6360000002 HC RX W HCPCS: Performed by: INTERNAL MEDICINE

## 2024-03-22 PROCEDURE — 2580000003 HC RX 258: Performed by: STUDENT IN AN ORGANIZED HEALTH CARE EDUCATION/TRAINING PROGRAM

## 2024-03-22 PROCEDURE — 93312 ECHO TRANSESOPHAGEAL: CPT

## 2024-03-22 PROCEDURE — 6360000002 HC RX W HCPCS: Performed by: STUDENT IN AN ORGANIZED HEALTH CARE EDUCATION/TRAINING PROGRAM

## 2024-03-22 PROCEDURE — 5A2204Z RESTORATION OF CARDIAC RHYTHM, SINGLE: ICD-10-PCS | Performed by: INTERNAL MEDICINE

## 2024-03-22 PROCEDURE — 2060000000 HC ICU INTERMEDIATE R&B

## 2024-03-22 PROCEDURE — 84100 ASSAY OF PHOSPHORUS: CPT

## 2024-03-22 RX ORDER — FENTANYL CITRATE 50 UG/ML
INJECTION, SOLUTION INTRAMUSCULAR; INTRAVENOUS PRN
Status: DISCONTINUED | OUTPATIENT
Start: 2024-03-22 | End: 2024-03-22

## 2024-03-22 RX ORDER — MIDAZOLAM HYDROCHLORIDE 1 MG/ML
INJECTION INTRAMUSCULAR; INTRAVENOUS PRN
Status: DISCONTINUED | OUTPATIENT
Start: 2024-03-22 | End: 2024-03-22

## 2024-03-22 RX ORDER — LIDOCAINE HYDROCHLORIDE 20 MG/ML
SOLUTION OROPHARYNGEAL PRN
Status: DISCONTINUED | OUTPATIENT
Start: 2024-03-22 | End: 2024-03-22

## 2024-03-22 RX ADMIN — LIDOCAINE HYDROCHLORIDE 15 ML: 20 SOLUTION ORAL at 11:13

## 2024-03-22 RX ADMIN — SODIUM CHLORIDE: 9 INJECTION, SOLUTION INTRAVENOUS at 00:56

## 2024-03-22 RX ADMIN — METOPROLOL TARTRATE 25 MG: 25 TABLET, FILM COATED ORAL at 17:41

## 2024-03-22 RX ADMIN — MIDAZOLAM HYDROCHLORIDE 1 MG: 1 INJECTION, SOLUTION INTRAMUSCULAR; INTRAVENOUS at 11:19

## 2024-03-22 RX ADMIN — SODIUM CHLORIDE, PRESERVATIVE FREE 10 ML: 5 INJECTION INTRAVENOUS at 09:11

## 2024-03-22 RX ADMIN — FENTANYL CITRATE 25 MCG: 50 INJECTION, SOLUTION INTRAMUSCULAR; INTRAVENOUS at 11:19

## 2024-03-22 RX ADMIN — AMIODARONE HYDROCHLORIDE 1 MG/MIN: 50 INJECTION, SOLUTION INTRAVENOUS at 17:18

## 2024-03-22 RX ADMIN — MIDAZOLAM HYDROCHLORIDE 1 MG: 1 INJECTION, SOLUTION INTRAMUSCULAR; INTRAVENOUS at 11:33

## 2024-03-22 RX ADMIN — AMIODARONE HYDROCHLORIDE 1 MG/MIN: 50 INJECTION, SOLUTION INTRAVENOUS at 00:54

## 2024-03-22 RX ADMIN — ASPIRIN 81 MG: 81 TABLET, CHEWABLE ORAL at 09:11

## 2024-03-22 RX ADMIN — METOPROLOL TARTRATE 25 MG: 25 TABLET, FILM COATED ORAL at 09:11

## 2024-03-22 RX ADMIN — FENTANYL CITRATE 25 MCG: 50 INJECTION, SOLUTION INTRAMUSCULAR; INTRAVENOUS at 11:22

## 2024-03-22 RX ADMIN — FENTANYL CITRATE 25 MCG: 50 INJECTION, SOLUTION INTRAMUSCULAR; INTRAVENOUS at 11:33

## 2024-03-22 RX ADMIN — ATORVASTATIN CALCIUM 40 MG: 40 TABLET, FILM COATED ORAL at 09:11

## 2024-03-22 RX ADMIN — AMIODARONE HYDROCHLORIDE 1 MG/MIN: 50 INJECTION, SOLUTION INTRAVENOUS at 09:10

## 2024-03-22 RX ADMIN — APIXABAN 5 MG: 5 TABLET, FILM COATED ORAL at 22:18

## 2024-03-22 RX ADMIN — BENZOCAINE, BUTAMBEN, AND TETRACAINE HYDROCHLORIDE 3 SPRAY: .028; .004; .004 AEROSOL, SPRAY TOPICAL at 11:14

## 2024-03-22 RX ADMIN — ENOXAPARIN SODIUM 150 MG: 150 INJECTION SUBCUTANEOUS at 09:11

## 2024-03-22 RX ADMIN — MIDAZOLAM HYDROCHLORIDE 1 MG: 1 INJECTION, SOLUTION INTRAMUSCULAR; INTRAVENOUS at 11:22

## 2024-03-22 RX ADMIN — INSULIN GLARGINE 40 UNITS: 100 INJECTION, SOLUTION SUBCUTANEOUS at 22:19

## 2024-03-22 ASSESSMENT — PAIN SCALES - GENERAL
PAINLEVEL_OUTOF10: 0
PAINLEVEL_OUTOF10: 0

## 2024-03-22 NOTE — PROGRESS NOTES
Hospitalist Progress Note    NAME:   Mahin Araya   : 1960   MRN: [id number]     Date/Time: 3/22/2024 3:31 PM  Patient PCP: Martin Woods MD    Estimated discharge date: 3/23  Barriers: Improvement of heart rate, cardiology clearance      Assessment / Plan:    New onset atrial fibrillation with rapid ventricular rate status post cardioversion on 3/22  Borderline low blood pressure  -S/p cardioversion on 3/22 underwent sinus rhythm  -S/p amiodarone drip and will change to p.o.  -Cardiology recommended to add Eliquis from tomorrow.  Continue added metoprolol as well  -Check CBC and BMP in a.m.        CKD stage III  -Baseline creatinine is around 1.2-1.8.  Currently creatinine is 2.3.  Check BMP in AM.  Avoid nephrotoxic medications.    Diabetes mellitus type 2  -Continue Lantus 40 units nightly along with insulin sliding scale and blood sugar checks    Hypertension  Dyslipidemia  Diabetic neuropathy  -Holding all antihypertensives due to borderline low blood pressure  -Continue PTA statin  -Continue PTA gabapentin    3 mm lung nodule on CT on 3/8  -Will need repeat CT in 1 year    Hx of COPD  Hx of MARIJA  -May need home o2. Will do a oxygen challenge test.    Medical Decision Making:   I personally reviewed labs: CBC, BMP  I personally reviewed imaging:  I personally reviewed EKG: Telemetry monitoring  Toxic drug monitoring:   Discussed case with: Cardiology, pharmacy,         Code Status: Full code  DVT Prophylaxis: Eliquis  GI Prophylaxis:    Subjective:     Chief Complaint / Reason for Physician Visit  Does not report chest pain or sob  Denies palpations  Blood pressure borderline low overnight but now improved  Still tachycardic with A-fib with RVR    Objective:     VITALS:   Last 24hrs VS reviewed since prior progress note. Most recent are:  Patient Vitals for the past 24 hrs:   BP Temp Temp src Pulse Resp SpO2   24 1210 -- -- -- 77 19 92 %   24 1205 (!) 97/54 -- -- 76 20

## 2024-03-22 NOTE — PROGRESS NOTES
Cardiology Progress Note      3/22/2024 11:14 AM    Admit Date: 3/20/2024          Subjective: Remains in rapid a flutter. No new c/o          BP (!) 145/91   Pulse (!) 126   Temp 97.7 °F (36.5 °C) (Oral)   Resp 19   Ht 1.829 m (6' 0.01\")   Wt (!) 144.9 kg (319 lb 7.1 oz)   SpO2 (!) 89%   BMI 43.32 kg/m²   03/20 1901 - 03/22 0700  In: 300 [P.O.:300]  Out: 650 [Urine:650]        Objective:      Physical Exam:  VS as above  Chest CTA  Card tachy, irreg, no gallop     Data Review:   Labs:    Hgb 12.4  BUN 37  Creat 2.3     Telemetry: a flutter R 130       Assessment:     A flutter with RVR- no prior Hx  EF 30-35%, prior nl EF last fall   Htn and hypertensive ht dz, nl LVEF  DM type 2  CKD stage 4  MARIJA  PVD, prior iliac stent  Chronic LE edema and lymphedema   Tob abuse    Plan:  For CHILO/DCC today Cont current Rx. Switch heparain to NOAC later today

## 2024-03-22 NOTE — PROGRESS NOTES
CHILO completed s complic    EF 40-45%  No LA or WAYNE thjrombus    Successful DCC utilizing one 300J shock     Change amiodaron eto PO on AM  Start NOAC prior to d/c

## 2024-03-22 NOTE — PROGRESS NOTES
End of Shift Note    Bedside shift change report given to rogerio (oncoming nurse) by Elgin Marquez RN (offgoing nurse).  Report included the following information Cardiac Rhythm A flutter    Shift worked:  1900-0730     Shift summary and any significant changes:     Patient remained on Amiodarone drip,he is still in Aflutter. He has Been Npo for a cardioversion today.He denies having any pain. No new issues raised.     Concerns for physician to address:  None     Zone phone for oncoming shift:   [phone]       Activity:     Number times ambulated in hallways past shift: 2  Number of times OOB to chair past shift: 1    Cardiac:   Cardiac Monitoring: Yes           Access:  Current line(s): PIV     Genitourinary:   Urinary status: voiding    Respiratory:      Chronic home O2 use?: NO  Incentive spirometer at bedside: NO       GI:     Current diet:  Diet NPO  Passing flatus: YES  Tolerating current diet: YES       Pain Management:   Patient states pain is manageable on current regimen: YES    Skin:     Interventions: float heels    Patient Safety:  Fall Score:    Interventions: gripper socks       Length of Stay:  Expected LOS: 3  Actual LOS: 2      Elgin Marquez RN

## 2024-03-22 NOTE — CONSULTS
Pt seen and examined    Full consult dictated    Add metoprolol 25 mg qid  Plan CHILO/DCC tomm   
hours, aspirin, Lipitor, Lantus insulin, Humalog sliding scale.    SOCIAL HISTORY:  The patient does smoke and drinks alcohol.  He works as a  in [employer].    FAMILY HISTORY:  The patient's brother had Dhruv-Parkinson-White syndrome and an ablation.    REVIEW OF SYSTEMS:  As noted above, otherwise noncontributory.    PHYSICAL EXAMINATION:  GENERAL:  Reveals a late middle-aged white male, in no acute distress.  VITAL SIGNS:  Blood pressure 123/83, pulse 131, respirations 13.  HEENT:  Pupils are equal and reactive to light.  Oropharynx, moist oral mucosa.  NECK:  Supple.  No masses or thyromegaly.  No cervical or supraclavicular adenopathy.  No carotid bruits.  No JVD.  CHEST:  Clear.  No wheeze or crackles.  SKIN:  Warm and dry.  CARDIAC:  Irregularly irregular rhythm.  Tachycardic.  No obvious murmurs, rubs, gallops, or clicks.  ABDOMEN:  Obese, soft, nontender.  No masses or organomegaly.  Bowel sounds positive.  EXTREMITIES:  No cyanosis or clubbing.  2 to 3+ pedal edema bilaterally.  Distal pulses not palpable.   NEURO:  No obvious gross motor deficits.    LABORATORY DATA:  Hemoglobin 14.7, BUN 34, creatinine 2.2.  EKG shows atrial flutter with rapid ventricular response, nonspecific ST-T changes.    IMPRESSION:    1. Atrial flutter with rapid ventricular response of uncertain duration.  2. Hypertension and hypertensive heart disease.  3. Type 2 diabetes.  4. Sleep apnea.  5. Peripheral vascular disease with prior iliac stents.  6. Chronic kidney disease stage IV.  7. Recent cellulitis of the left foot.    RECOMMENDATIONS:  Agree with current medications.  We will start metoprolol 25 mg 4 times a day to improve rate control.  We will plan on CHILO cardioversion tomorrow.  This was explained to the patient and he understands and agrees with the plan.        MD RIN HAGEN/ELICEO  D:  03/22/2024 10:20:30  T:  03/22/2024 10:49:00  JOB #:  [id number]

## 2024-03-22 NOTE — PROGRESS NOTES
Pt sedated with 2mg Versed and 50mcg Fentanyl for CHILO (monitored sedation from 1113 to 1137)    Pt sedated with an additional 1mg Versed and 25mcg Fentanyl, given 1 synchronized shock(s) at 300 Joules, AFlutter converted to NSR.(monitored sedation from 1113 to 1137)

## 2024-03-22 NOTE — WOUND CARE
Wound care nurse consult for left foot wound POA    62 y/o diabetic male admitted for A-fib.   Past Medical History:   Diagnosis Date    Cellulitis of left foot     DM (diabetes mellitus) (HCC)     HTN (hypertension)     Iliac artery stenosis, left (HCC)     Lymphedema of both lower extremities     Smoker      No past surgical history on file.  Patient is followed by podiatrist monthly and patient changes dressing q 3-4 days at home for a diabetic Charcot foot wound. Wound is not currently infected or an issue this admission that would need a podiatrist.      WOUND POA CONDITIONS    Wound 03/22/24 Left;Plantar;Mid (Active)   Wound Image   03/22/24 1036   Wound Etiology Diabetic 03/22/24 1036   Dressing Status New dressing applied 03/22/24 1036   Wound Cleansed Betadine/povidone iodine 03/22/24 1036   Dressing/Treatment Silver dressing 03/22/24 1036   Wound Length (cm) 1.2 cm 03/22/24 1036   Wound Width (cm) 0.8 cm 03/22/24 1036   Wound Depth (cm) 1 cm 03/22/24 1036   Wound Surface Area (cm^2) 0.96 cm^2 03/22/24 1036   Wound Volume (cm^3) 0.96 cm^3 03/22/24 1036   Wound Assessment Pink/red 03/22/24 1036   Drainage Amount Small (< 25%) 03/22/24 1036   Drainage Description Serosanguinous 03/22/24 1036   Odor None 03/22/24 1036   Joanne-wound Assessment Intact 03/22/24 1036   Margins Epibole (rolled edges) 03/22/24 1036   Wound Thickness Description not for Pressure Injury Full thickness 03/22/24 1036   Number of days: 0       Recommend: Left plantar foot wound while inpatient, MWF cleanse with betadine swab, cover wound with a piece of silver alginate (Opticell AG) and then Optifoam AG, securing with Coban loosely wrapped.    Patient will most likely be discharged to home and will resume his wound care plan from his podiatrist.    GUILLERMO FRANCISCO RN, CWON

## 2024-03-22 NOTE — PROGRESS NOTES
TRANSFER - IN REPORT:    Verbal report received from Melva(name) on Mahin Araya  being received from [facility](unit) for ordered procedure      Report consisted of patient’s Situation, Background, Assessment and   Recommendations(SBAR).     Information from the following report(s) Nurse Handoff Report was reviewed with the receiving nurse.    Opportunity for questions and clarification was provided.      Assessment completed upon patient’s arrival to unit and care assume

## 2024-03-23 LAB
ANION GAP SERPL CALC-SCNC: 3 MMOL/L (ref 5–15)
BASOPHILS # BLD: 0 K/UL (ref 0–0.1)
BASOPHILS NFR BLD: 0 % (ref 0–1)
BUN SERPL-MCNC: 37 MG/DL (ref 6–20)
BUN/CREAT SERPL: 16 (ref 12–20)
CALCIUM SERPL-MCNC: 8.9 MG/DL (ref 8.5–10.1)
CHLORIDE SERPL-SCNC: 106 MMOL/L (ref 97–108)
CO2 SERPL-SCNC: 24 MMOL/L (ref 21–32)
CREAT SERPL-MCNC: 2.3 MG/DL (ref 0.7–1.3)
DIFFERENTIAL METHOD BLD: ABNORMAL
EOSINOPHIL # BLD: 0.1 K/UL (ref 0–0.4)
EOSINOPHIL NFR BLD: 1 % (ref 0–7)
ERYTHROCYTE [DISTWIDTH] IN BLOOD BY AUTOMATED COUNT: 14 % (ref 11.5–14.5)
GLUCOSE BLD STRIP.AUTO-MCNC: 148 MG/DL (ref 65–117)
GLUCOSE BLD STRIP.AUTO-MCNC: 190 MG/DL (ref 65–117)
GLUCOSE BLD STRIP.AUTO-MCNC: 196 MG/DL (ref 65–117)
GLUCOSE BLD STRIP.AUTO-MCNC: 256 MG/DL (ref 65–117)
GLUCOSE SERPL-MCNC: 193 MG/DL (ref 65–100)
HCT VFR BLD AUTO: 40.2 % (ref 36.6–50.3)
HGB BLD-MCNC: 12.1 G/DL (ref 12.1–17)
IMM GRANULOCYTES # BLD AUTO: 0 K/UL (ref 0–0.04)
IMM GRANULOCYTES NFR BLD AUTO: 0 % (ref 0–0.5)
LYMPHOCYTES # BLD: 1.3 K/UL (ref 0.8–3.5)
LYMPHOCYTES NFR BLD: 12 % (ref 12–49)
MAGNESIUM SERPL-MCNC: 1.9 MG/DL (ref 1.6–2.4)
MCH RBC QN AUTO: 28.1 PG (ref 26–34)
MCHC RBC AUTO-ENTMCNC: 30.1 G/DL (ref 30–36.5)
MCV RBC AUTO: 93.5 FL (ref 80–99)
MONOCYTES # BLD: 0.9 K/UL (ref 0–1)
MONOCYTES NFR BLD: 9 % (ref 5–13)
NEUTS SEG # BLD: 7.9 K/UL (ref 1.8–8)
NEUTS SEG NFR BLD: 78 % (ref 32–75)
NRBC # BLD: 0 K/UL (ref 0–0.01)
NRBC BLD-RTO: 0 PER 100 WBC
PLATELET # BLD AUTO: 159 K/UL (ref 150–400)
PMV BLD AUTO: 9.8 FL (ref 8.9–12.9)
POTASSIUM SERPL-SCNC: 4.6 MMOL/L (ref 3.5–5.1)
RBC # BLD AUTO: 4.3 M/UL (ref 4.1–5.7)
SERVICE CMNT-IMP: ABNORMAL
SODIUM SERPL-SCNC: 133 MMOL/L (ref 136–145)
WBC # BLD AUTO: 10.2 K/UL (ref 4.1–11.1)

## 2024-03-23 PROCEDURE — 6370000000 HC RX 637 (ALT 250 FOR IP): Performed by: INTERNAL MEDICINE

## 2024-03-23 PROCEDURE — 2580000003 HC RX 258: Performed by: STUDENT IN AN ORGANIZED HEALTH CARE EDUCATION/TRAINING PROGRAM

## 2024-03-23 PROCEDURE — 2580000003 HC RX 258: Performed by: INTERNAL MEDICINE

## 2024-03-23 PROCEDURE — 6360000002 HC RX W HCPCS: Performed by: INTERNAL MEDICINE

## 2024-03-23 PROCEDURE — 36415 COLL VENOUS BLD VENIPUNCTURE: CPT

## 2024-03-23 PROCEDURE — 85025 COMPLETE CBC W/AUTO DIFF WBC: CPT

## 2024-03-23 PROCEDURE — 2700000000 HC OXYGEN THERAPY PER DAY

## 2024-03-23 PROCEDURE — 2060000000 HC ICU INTERMEDIATE R&B

## 2024-03-23 PROCEDURE — 6370000000 HC RX 637 (ALT 250 FOR IP): Performed by: STUDENT IN AN ORGANIZED HEALTH CARE EDUCATION/TRAINING PROGRAM

## 2024-03-23 PROCEDURE — 6360000002 HC RX W HCPCS: Performed by: STUDENT IN AN ORGANIZED HEALTH CARE EDUCATION/TRAINING PROGRAM

## 2024-03-23 PROCEDURE — 82962 GLUCOSE BLOOD TEST: CPT

## 2024-03-23 PROCEDURE — 83735 ASSAY OF MAGNESIUM: CPT

## 2024-03-23 PROCEDURE — 80048 BASIC METABOLIC PNL TOTAL CA: CPT

## 2024-03-23 RX ORDER — AMIODARONE HYDROCHLORIDE 200 MG/1
200 TABLET ORAL DAILY
Status: DISCONTINUED | OUTPATIENT
Start: 2024-03-23 | End: 2024-03-24 | Stop reason: HOSPADM

## 2024-03-23 RX ADMIN — AMIODARONE HYDROCHLORIDE 200 MG: 200 TABLET ORAL at 10:45

## 2024-03-23 RX ADMIN — ASPIRIN 81 MG: 81 TABLET, CHEWABLE ORAL at 08:03

## 2024-03-23 RX ADMIN — SODIUM CHLORIDE, PRESERVATIVE FREE 10 ML: 5 INJECTION INTRAVENOUS at 21:07

## 2024-03-23 RX ADMIN — SODIUM CHLORIDE, PRESERVATIVE FREE 10 ML: 5 INJECTION INTRAVENOUS at 00:14

## 2024-03-23 RX ADMIN — AMIODARONE HYDROCHLORIDE 1 MG/MIN: 50 INJECTION, SOLUTION INTRAVENOUS at 00:13

## 2024-03-23 RX ADMIN — SODIUM CHLORIDE, PRESERVATIVE FREE 10 ML: 5 INJECTION INTRAVENOUS at 08:03

## 2024-03-23 RX ADMIN — APIXABAN 5 MG: 5 TABLET, FILM COATED ORAL at 08:03

## 2024-03-23 RX ADMIN — INSULIN LISPRO 4 UNITS: 100 INJECTION, SOLUTION INTRAVENOUS; SUBCUTANEOUS at 11:09

## 2024-03-23 RX ADMIN — ATORVASTATIN CALCIUM 40 MG: 40 TABLET, FILM COATED ORAL at 08:03

## 2024-03-23 RX ADMIN — INSULIN GLARGINE 40 UNITS: 100 INJECTION, SOLUTION SUBCUTANEOUS at 21:07

## 2024-03-23 RX ADMIN — AMIODARONE HYDROCHLORIDE 0.5 MG/MIN: 50 INJECTION, SOLUTION INTRAVENOUS at 08:17

## 2024-03-23 RX ADMIN — APIXABAN 5 MG: 5 TABLET, FILM COATED ORAL at 21:06

## 2024-03-23 RX ADMIN — METOPROLOL TARTRATE 25 MG: 25 TABLET, FILM COATED ORAL at 21:06

## 2024-03-23 RX ADMIN — METOPROLOL TARTRATE 25 MG: 25 TABLET, FILM COATED ORAL at 08:03

## 2024-03-23 ASSESSMENT — PAIN SCALES - GENERAL
PAINLEVEL_OUTOF10: 0
PAINLEVEL_OUTOF10: 0

## 2024-03-23 NOTE — PROGRESS NOTES
Hospitalist Progress Note    NAME:   Mahin Araya   : 1960   MRN: [id number]     Date/Time: 3/23/2024 2:49 PM  Patient PCP: Martin Woods MD    Estimated discharge date: 3/24  Barriers: Home o2       Assessment / Plan:    New onset atrial fibrillation with rapid ventricular rate status post cardioversion on 3/22  Borderline low blood pressure  -S/p cardioversion on 3/22 underwent sinus rhythm  -S/p amiodarone drip.  Now on p.o. amiodarone  -Cardiology recommended Eliquis on oh on Eliquis  -Check CBC and BMP in a.m.        CKD stage III  -Baseline creatinine is around 1.2-1.8.  Currently creatinine is 2.3.  Check BMP in AM.  Avoid nephrotoxic medications.    Diabetes mellitus type 2  -Continue Lantus 40 units nightly along with insulin sliding scale and blood sugar checks    Hypertension  Dyslipidemia  Diabetic neuropathy  -Continue metoprolol  -Continue PTA statin  -Continue PTA gabapentin    3 mm lung nodule on CT on 3/8  -Will need repeat CT in 1 year    Hx of COPD  Hx of MARIJA  -Desaturated while on walking.  Consult case management to arrange oxygen    Medical Decision Making:   I personally reviewed labs: CBC, BMP  I personally reviewed imaging:  I personally reviewed EKG: Telemetry monitoring  Toxic drug monitoring:   Discussed case with: Cardiology, pharmacy,         Code Status: Full code  DVT Prophylaxis: Eliquis  GI Prophylaxis:    Subjective:     Chief Complaint / Reason for Physician Visit  Still on 3 L oxygen and desaturated upon walking  Does not report any shortness of breath, mild cough, no phlegm    Objective:     VITALS:   Last 24hrs VS reviewed since prior progress note. Most recent are:  Patient Vitals for the past 24 hrs:   BP Temp Temp src Pulse Resp SpO2   24 1100 (!) 146/68 98.8 °F (37.1 °C) Oral 82 -- 90 %   24 0803 (!) 151/71 98.7 °F (37.1 °C) Oral 82 -- 92 %   24 1600 111/67 -- -- 82 -- --   24 1500 117/69 98.1 °F (36.7 °C) Oral 87 16

## 2024-03-23 NOTE — PROGRESS NOTES
Patient alert and oriented. Ambulatory, stable gait. O2 challenge test performed per MD. Patient walked up and down [address] (about 50ft) on RA, sats dropped down to 81%, SOB noted upon arrival back to room. Auditory wheezing noted to upper airway. Wheezing to left upper anterior and posterior lungs- diminished bases. Right lung clear. Paced back on 3L of O2 via NC.

## 2024-03-23 NOTE — PLAN OF CARE
Problem: Discharge Planning  Goal: Discharge to home or other facility with appropriate resources  Outcome: Progressing  Flowsheets (Taken 3/23/2024 0815 by Clyde Schwarz, RN)  Discharge to home or other facility with appropriate resources: Identify barriers to discharge with patient and caregiver     Problem: Chronic Conditions and Co-morbidities  Goal: Patient's chronic conditions and co-morbidity symptoms are monitored and maintained or improved  Outcome: Progressing  Flowsheets (Taken 3/23/2024 0815 by Clyde Schwarz, RN)  Care Plan - Patient's Chronic Conditions and Co-Morbidity Symptoms are Monitored and Maintained or Improved:   Monitor and assess patient's chronic conditions and comorbid symptoms for stability, deterioration, or improvement   Collaborate with multidisciplinary team to address chronic and comorbid conditions and prevent exacerbation or deterioration     Problem: Safety - Adult  Goal: Free from fall injury  Outcome: Progressing

## 2024-03-23 NOTE — PROGRESS NOTES
End of Shift Note    Bedside shift change report given to Clyde (oncoming nurse) by Elgin Marquez RN (offgoing nurse).  Report included the following information Cardiac Rhythm NSR    Shift worked:  1900-0730     Shift summary and any significant changes:     Patient received post cardioversion. He remains on Amiodarone drip ,to be discontinued in the morning. He reports being pain free. His vitals are WNL. He remains in SR.      Concerns for physician to address:  Dc Iv Amiodarone. Patient wants to be discharged today.Has been on oxygen 2 L since the procedure. Saturations on room air is 88%. patient reports he has COPD and sleep apnea for which he waes CPAP at home.     Zone phone for oncoming shift:   [phone]       Activity:     Number times ambulated in hallways past shift: 2  Number of times OOB to chair past shift: 2    Cardiac:   Cardiac Monitoring: Yes           Access:  Current line(s): PIV     Genitourinary:   Urinary status: voiding    Respiratory:      Chronic home O2 use?: YES  Incentive spirometer at bedside: NO       GI:     Current diet:  ADULT DIET; Regular; 4 carb choices (60 gm/meal)  DIET ONE TIME MESSAGE;  Passing flatus: YES  Tolerating current diet: YES       Pain Management:   Patient states pain is manageable on current regimen: YES    Skin:     Interventions: increase time out of bed    Patient Safety:  Fall Score:    Interventions: gripper socks       Length of Stay:  Expected LOS: 3  Actual LOS: 2      Elgin Marquez RN

## 2024-03-23 NOTE — PROGRESS NOTES
Cardiology Progress Note      3/23/2024 11:39 AM    Admit Date: 3/20/2024          Subjective: In sinus.         BP (!) 151/71   Pulse 82   Temp 98.7 °F (37.1 °C) (Oral)   Resp 16   Ht 1.829 m (6' 0.01\")   Wt (!) 144.9 kg (319 lb 7.1 oz)   SpO2 92%   BMI 43.32 kg/m²   03/21 1901 - 03/23 0700  In: 60 [P.O.:60]  Out: 825 [Urine:825]        Objective:      Physical Exam:  VS as above  Chest CTA  Card tachy, irreg, no gallop     Data Review:   Labs:    Hgb 12.4  BUN 37  Creat 2.3     Telemetry: a flutter R 130       Assessment:     A flutter with RVR- no prior Hx  EF 30-35%, prior nl EF last fall   Htn and hypertensive ht dz, nl LVEF  DM type 2  CKD stage 4  MARIJA  PVD, prior iliac stent  Chronic LE edema and lymphedema   Tob abuse    Plan:  CHILO/CV w/ EF 40-45% s/p CV to SR.    Cont oral amio and eliquis.  Cont metoprolol, statin.  Dispo per primary.    Will be available as needed.

## 2024-03-24 VITALS
SYSTOLIC BLOOD PRESSURE: 153 MMHG | TEMPERATURE: 98.2 F | RESPIRATION RATE: 18 BRPM | HEART RATE: 60 BPM | BODY MASS INDEX: 42.66 KG/M2 | HEIGHT: 72 IN | WEIGHT: 315 LBS | OXYGEN SATURATION: 98 % | DIASTOLIC BLOOD PRESSURE: 52 MMHG

## 2024-03-24 LAB
ANION GAP SERPL CALC-SCNC: 6 MMOL/L (ref 5–15)
BASOPHILS # BLD: 0.1 K/UL (ref 0–0.1)
BASOPHILS NFR BLD: 1 % (ref 0–1)
BUN SERPL-MCNC: 33 MG/DL (ref 6–20)
BUN/CREAT SERPL: 15 (ref 12–20)
CALCIUM SERPL-MCNC: 9.3 MG/DL (ref 8.5–10.1)
CHLORIDE SERPL-SCNC: 107 MMOL/L (ref 97–108)
CO2 SERPL-SCNC: 25 MMOL/L (ref 21–32)
CREAT SERPL-MCNC: 2.24 MG/DL (ref 0.7–1.3)
DIFFERENTIAL METHOD BLD: ABNORMAL
EKG ATRIAL RATE: 73 BPM
EKG DIAGNOSIS: NORMAL
EKG P AXIS: 72 DEGREES
EKG P-R INTERVAL: 176 MS
EKG Q-T INTERVAL: 420 MS
EKG QRS DURATION: 90 MS
EKG QTC CALCULATION (BAZETT): 462 MS
EKG R AXIS: 82 DEGREES
EKG T AXIS: 84 DEGREES
EKG VENTRICULAR RATE: 73 BPM
EOSINOPHIL # BLD: 0.2 K/UL (ref 0–0.4)
EOSINOPHIL NFR BLD: 2 % (ref 0–7)
ERYTHROCYTE [DISTWIDTH] IN BLOOD BY AUTOMATED COUNT: 13.6 % (ref 11.5–14.5)
GLUCOSE BLD STRIP.AUTO-MCNC: 119 MG/DL (ref 65–117)
GLUCOSE BLD STRIP.AUTO-MCNC: 196 MG/DL (ref 65–117)
GLUCOSE BLD STRIP.AUTO-MCNC: 280 MG/DL (ref 65–117)
GLUCOSE SERPL-MCNC: 133 MG/DL (ref 65–100)
HCT VFR BLD AUTO: 40.9 % (ref 36.6–50.3)
HGB BLD-MCNC: 12.6 G/DL (ref 12.1–17)
IMM GRANULOCYTES # BLD AUTO: 0.1 K/UL (ref 0–0.04)
IMM GRANULOCYTES NFR BLD AUTO: 1 % (ref 0–0.5)
LYMPHOCYTES # BLD: 1.5 K/UL (ref 0.8–3.5)
LYMPHOCYTES NFR BLD: 15 % (ref 12–49)
MAGNESIUM SERPL-MCNC: 1.8 MG/DL (ref 1.6–2.4)
MCH RBC QN AUTO: 28.4 PG (ref 26–34)
MCHC RBC AUTO-ENTMCNC: 30.8 G/DL (ref 30–36.5)
MCV RBC AUTO: 92.3 FL (ref 80–99)
MONOCYTES # BLD: 1.2 K/UL (ref 0–1)
MONOCYTES NFR BLD: 12 % (ref 5–13)
NEUTS SEG # BLD: 7.1 K/UL (ref 1.8–8)
NEUTS SEG NFR BLD: 69 % (ref 32–75)
NRBC # BLD: 0 K/UL (ref 0–0.01)
NRBC BLD-RTO: 0 PER 100 WBC
PLATELET # BLD AUTO: 163 K/UL (ref 150–400)
PMV BLD AUTO: 10 FL (ref 8.9–12.9)
POTASSIUM SERPL-SCNC: 4.6 MMOL/L (ref 3.5–5.1)
RBC # BLD AUTO: 4.43 M/UL (ref 4.1–5.7)
SERVICE CMNT-IMP: ABNORMAL
SODIUM SERPL-SCNC: 138 MMOL/L (ref 136–145)
WBC # BLD AUTO: 10 K/UL (ref 4.1–11.1)

## 2024-03-24 PROCEDURE — 6370000000 HC RX 637 (ALT 250 FOR IP): Performed by: INTERNAL MEDICINE

## 2024-03-24 PROCEDURE — 2700000000 HC OXYGEN THERAPY PER DAY

## 2024-03-24 PROCEDURE — 80048 BASIC METABOLIC PNL TOTAL CA: CPT

## 2024-03-24 PROCEDURE — 94640 AIRWAY INHALATION TREATMENT: CPT

## 2024-03-24 PROCEDURE — 36415 COLL VENOUS BLD VENIPUNCTURE: CPT

## 2024-03-24 PROCEDURE — 83735 ASSAY OF MAGNESIUM: CPT

## 2024-03-24 PROCEDURE — 6370000000 HC RX 637 (ALT 250 FOR IP): Performed by: STUDENT IN AN ORGANIZED HEALTH CARE EDUCATION/TRAINING PROGRAM

## 2024-03-24 PROCEDURE — 2580000003 HC RX 258: Performed by: STUDENT IN AN ORGANIZED HEALTH CARE EDUCATION/TRAINING PROGRAM

## 2024-03-24 PROCEDURE — 82962 GLUCOSE BLOOD TEST: CPT

## 2024-03-24 PROCEDURE — 85025 COMPLETE CBC W/AUTO DIFF WBC: CPT

## 2024-03-24 RX ORDER — PREDNISONE 20 MG/1
40 TABLET ORAL DAILY
Status: DISCONTINUED | OUTPATIENT
Start: 2024-03-24 | End: 2024-03-24 | Stop reason: HOSPADM

## 2024-03-24 RX ORDER — AMIODARONE HYDROCHLORIDE 200 MG/1
200 TABLET ORAL DAILY
Qty: 30 TABLET | Refills: 1 | Status: SHIPPED | OUTPATIENT
Start: 2024-03-25

## 2024-03-24 RX ORDER — IPRATROPIUM BROMIDE AND ALBUTEROL SULFATE 2.5; .5 MG/3ML; MG/3ML
1 SOLUTION RESPIRATORY (INHALATION)
Status: DISCONTINUED | OUTPATIENT
Start: 2024-03-24 | End: 2024-03-24 | Stop reason: HOSPADM

## 2024-03-24 RX ORDER — BUDESONIDE AND FORMOTEROL FUMARATE DIHYDRATE 160; 4.5 UG/1; UG/1
2 AEROSOL RESPIRATORY (INHALATION) 2 TIMES DAILY
Qty: 30.6 G | Refills: 1 | Status: SHIPPED | OUTPATIENT
Start: 2024-03-24

## 2024-03-24 RX ORDER — PREDNISONE 20 MG/1
40 TABLET ORAL DAILY
Qty: 8 TABLET | Refills: 0 | Status: SHIPPED | OUTPATIENT
Start: 2024-03-25 | End: 2024-03-29

## 2024-03-24 RX ORDER — LOSARTAN POTASSIUM AND HYDROCHLOROTHIAZIDE 25; 100 MG/1; MG/1
1 TABLET ORAL DAILY
Qty: 30 TABLET | Refills: 3 | Status: SHIPPED | OUTPATIENT
Start: 2024-04-02

## 2024-03-24 RX ORDER — IPRATROPIUM BROMIDE AND ALBUTEROL SULFATE 2.5; .5 MG/3ML; MG/3ML
1 SOLUTION RESPIRATORY (INHALATION) EVERY 4 HOURS PRN
Status: DISCONTINUED | OUTPATIENT
Start: 2024-03-24 | End: 2024-03-24 | Stop reason: HOSPADM

## 2024-03-24 RX ORDER — ALBUTEROL SULFATE 90 UG/1
2 AEROSOL, METERED RESPIRATORY (INHALATION) 4 TIMES DAILY PRN
Qty: 18 G | Refills: 1 | Status: SHIPPED | OUTPATIENT
Start: 2024-03-24

## 2024-03-24 RX ADMIN — PREDNISONE 40 MG: 20 TABLET ORAL at 11:46

## 2024-03-24 RX ADMIN — IPRATROPIUM BROMIDE AND ALBUTEROL SULFATE 1 DOSE: .5; 3 SOLUTION RESPIRATORY (INHALATION) at 13:26

## 2024-03-24 RX ADMIN — METOPROLOL TARTRATE 25 MG: 25 TABLET, FILM COATED ORAL at 09:06

## 2024-03-24 RX ADMIN — INSULIN LISPRO 2 UNITS: 100 INJECTION, SOLUTION INTRAVENOUS; SUBCUTANEOUS at 17:10

## 2024-03-24 RX ADMIN — AMIODARONE HYDROCHLORIDE 200 MG: 200 TABLET ORAL at 09:06

## 2024-03-24 RX ADMIN — ASPIRIN 81 MG: 81 TABLET, CHEWABLE ORAL at 09:06

## 2024-03-24 RX ADMIN — APIXABAN 5 MG: 5 TABLET, FILM COATED ORAL at 09:06

## 2024-03-24 RX ADMIN — IPRATROPIUM BROMIDE AND ALBUTEROL SULFATE 1 DOSE: .5; 3 SOLUTION RESPIRATORY (INHALATION) at 09:54

## 2024-03-24 RX ADMIN — SODIUM CHLORIDE, PRESERVATIVE FREE 10 ML: 5 INJECTION INTRAVENOUS at 09:07

## 2024-03-24 RX ADMIN — ATORVASTATIN CALCIUM 40 MG: 40 TABLET, FILM COATED ORAL at 09:06

## 2024-03-24 ASSESSMENT — PAIN SCALES - GENERAL: PAINLEVEL_OUTOF10: 0

## 2024-03-24 NOTE — PROGRESS NOTES
Pulse oximetry assessment   89% at rest on room air (if 88% or less, skip next steps)  84% while ambulating on room air  92% at rest on 2LPM  94% while ambulating on 3LPM

## 2024-03-24 NOTE — DISCHARGE SUMMARY
Discharge Summary    Name: Mahin Araya  [id number]  YOB: 1960 (Age: 63 y.o.)   Date of Admission: 3/20/2024  Date of Discharge: 3/24/2024  Attending Physician: Jim Rivera MD    Discharge Diagnosis:     New onset atrial fibrillation with rapid ventricular rate status post cardioversion on 3/22  CKD stage III  Diabetes mellitus type 2  Hypertension  Dyslipidemia  Diabetic neuropathy  3 mm lung nodule on CT on 3/8  COPD with mild exacerbation now on home o2  Hx of MARIJA    Consultations:  IP CONSULT TO CARDIOLOGY  IP WOUND CARE NURSE CONSULT TO EVAL  IP CONSULT TO CASE MANAGEMENT      Brief Admission History/Reason for Admission Per Cristina Aparicio MD:   Mahin Araya is a 63 y.o.  male with PMHx significant as below, he went to visit his kidney physician, and while he they rechecked his heart rate is 140 and they told him to go to the emergency room, patient himself he denied any symptoms of palpitation chest pain shortness of breath lightheadedness, and he reported he is totally in his usual state of health.  Patient he takes his medication regularly.     Brief Hospital Course by Main Problems:     New onset atrial fibrillation with rapid ventricular rate status post cardioversion on 3/22  Borderline low blood pressure  -S/p cardioversion on 3/22 underwent sinus rhythm.S/p amiodarone drip.  Now on p.o. amiodarone. Cardiology recommended Eliquis on oh on Eliquis             CKD stage III  -Baseline creatinine is around 1.8.  Creatinine peaked at 2.3 and is currently 2.2.  It is currently 2.2.  Check BMP in 1 week with PCP.  I notified him to please hold his losartan for a week until he gets a BMP with PCP 1 week and resume after 1 week after discussion with PCP.     Diabetes mellitus type 2  -Resume home regimen as below including Lantus, lispro.  Hold home metformin until creatinine is improved.  I told him to get a BMP in 1 week and discuss with PCP about going

## 2024-03-24 NOTE — CARE COORDINATION
Pt is cleared for d/c from a CM standpoint.     2:28 p.m. CM met with pt to discuss d/c.   [employer] delivered portable tank to pt in room.  Pt's spouse will transport pt home.  CM unable to make follow up appts due to weekend d/c.     10:19 a.m. CM received a call from [employer] reporting pt is outside of their service area.  CM will send to [facility].     CM acknowledged d/c order.  CM ordering pt's home oxygen through [employer].         03/24/24 7930   Services At/After Discharge   Transition of Care Consult (CM Consult) Discharge Planning   Services At/After Discharge Outpatient care transitions  (home oxygen through [employer])   Mode of Transport at Discharge Other (see comment)  (spouse)         Janice Masters LMSW  Supervisee in Social Work  Care Management, [facility]  [phone]

## 2024-03-24 NOTE — DISCHARGE INSTRUCTIONS
Patient Discharge Instructions    Mahin Araya / [id number] : 1960    Admitted 3/20/2024 Discharged: 3/24/2024         DISCHARGE DIAGNOSIS:             Hospitalist Progress Note     NAME:              Mahin Araya   :   1960   MRN:   [id number]      Date/Time: 3/23/2024 2:49 PM  Patient PCP: Martin Woods MD           Assessment / Plan:     New onset atrial fibrillation with rapid ventricular rate status post cardioversion on 3/22  CKD stage III  Diabetes mellitus type 2  Hypertension  Dyslipidemia  Diabetic neuropathy  3 mm lung nodule on CT on 3/8  COPD with mild exacerbation now on home o2  Hx of MARIJA         Take Home Medications     {Medication reconciliation information is now added to the patient's AVS automatically when it is printed.  There is no need to use this SmartLink in discharge instructions.  Highlight this text and delete it to clear this message}      General drug facts     If you have a very bad allergy, wear an allergy ID at all times.   It is important that you take the medication exactly as they are prescribed.   Keep your medication in the bottles provided by the pharmacist.  Keep a list of all your drugs (prescription, natural products, vitamins, OTC) with you. Give this list to your doctor.  Do not take other medications without consulting your doctor.    Do not share your drugs with others and do not take anyone else's drugs.   Keep all drugs out of the reach of children and pets.    Most drugs may be thrown away in household trash after mixing with coffee grounds or laverne litter and sealing in a plastic bag.    Keep a list Call your doctor for help with any side effects. If in the [place] you may also call the FDA at 7-124-FDA-7271    Talk with the doctor before starting any new drug, including OTC, natural products, or vitamins.        What to do at Home    1. Recommended diet: Diabetic     2. Recommended activity: activity as tolerated    3. If you experience any

## 2024-03-28 NOTE — ADT AUTH CERT
[facility]  MRM 2 CARDIOPULMONARY CARE  [address] [address]  [email or web address] [id number]  @[email or web address] [id number]  Auth number: [id number]      RETURN CONTACT:  Kelly Alfonso Ph. [phone]  Fax: [phone]            Last edited by Kelly Alfonso on 03/21/24 at 1647     Patient Demographics    Name Patient ID SSN Gender Identity Birth Date   Mahin Araya [id number]  Male [birth year] (63 yrs)     Address Phone Email Employer    [address] [address] [phone] (H)  [phone] (M) [email or web address]  1      [employer] Race Occupation Emp Status    [employer] [place] (non-) -- Full Time      Reg Status PCP Date Last Verified Next Review Date    Verified Martin Woods MD  [phone] 03/08/24 04/07/24      Admission Date Discharge Date Admitting Provider     03/20/24 03/24/24 Cristina Aparicio MD       Marital Status Zoroastrian      Single Other        Emergency Contact 1   Vanessa Alves (GF)  [address] [address]  [facility]  [phone] (H)     Utilization Reviews       3/23  by Beckie Guerrero, PROSPER   Last Updated by Beckie Guerrero, RN on 3/25/2024 1451     Review Status Review Type Created By   In Primary -- Beckie Guerrero RN      Criteria Review   DATE:  3/23      Pertinent Updates: Still on 3 L oxygen and desaturated upon walking  Does not report any shortness of breath, mild cough, no phlegm        Vitals: TELEMETRY BED  BP (!) 151/71  Pulse 82  Temp 98.7 °F (37.1 °C) (Oral)  Resp 16  Ht 1.829 m (6' 0.01\")  Wt (!) 144.9 kg (319 lb 7.1 oz)  SpO2 89 - 92% 3 L NC         Abnl/Pertinent Labs/Radiology/Diagnostic Studies: , BUN 37 CRT  2.30, ANION GAP 3, EGFR  31, GLUC  193 / 148 / 256 / 190 / 196, NEUT  78        Physical Exam: Chest CTA  Card tachy, irreg, no gallop         MD Consults/Assessments & Plans:  CARDIOLOGY NOTE - A flutter with RVR- no prior Hx  EF 30-35%, prior nl EF last

## 2024-04-15 ENCOUNTER — OFFICE VISIT (OUTPATIENT)
Age: 64
End: 2024-04-15

## 2024-04-15 VITALS
HEIGHT: 72 IN | BODY MASS INDEX: 42.66 KG/M2 | HEART RATE: 89 BPM | DIASTOLIC BLOOD PRESSURE: 76 MMHG | SYSTOLIC BLOOD PRESSURE: 142 MMHG | WEIGHT: 315 LBS

## 2024-04-15 DIAGNOSIS — Z79.4 TYPE 2 DIABETES MELLITUS WITH HYPERGLYCEMIA, WITH LONG-TERM CURRENT USE OF INSULIN (HCC): Primary | ICD-10-CM

## 2024-04-15 DIAGNOSIS — E11.65 TYPE 2 DIABETES MELLITUS WITH HYPERGLYCEMIA, WITH LONG-TERM CURRENT USE OF INSULIN (HCC): Primary | ICD-10-CM

## 2024-04-15 LAB — HBA1C MFR BLD: 7.7 %

## 2024-06-21 ENCOUNTER — HOSPITAL ENCOUNTER (INPATIENT)
Facility: HOSPITAL | Age: 64
LOS: 4 days | Discharge: HOME | DRG: 309 | End: 2024-06-25
Attending: STUDENT IN AN ORGANIZED HEALTH CARE EDUCATION/TRAINING PROGRAM | Admitting: STUDENT IN AN ORGANIZED HEALTH CARE EDUCATION/TRAINING PROGRAM
Payer: COMMERCIAL

## 2024-06-21 ENCOUNTER — APPOINTMENT (OUTPATIENT)
Facility: HOSPITAL | Age: 64
DRG: 309 | End: 2024-06-21
Payer: COMMERCIAL

## 2024-06-21 DIAGNOSIS — I50.20 SYSTOLIC HEART FAILURE, UNSPECIFIED HF CHRONICITY (HCC): ICD-10-CM

## 2024-06-21 DIAGNOSIS — I48.91 ATRIAL FIBRILLATION (HCC): ICD-10-CM

## 2024-06-21 DIAGNOSIS — N17.9 AKI (ACUTE KIDNEY INJURY) (HCC): ICD-10-CM

## 2024-06-21 DIAGNOSIS — I48.92 ATRIAL FLUTTER WITH RAPID VENTRICULAR RESPONSE (HCC): Primary | ICD-10-CM

## 2024-06-21 LAB
ALBUMIN SERPL-MCNC: 3.3 G/DL (ref 3.5–5)
ALBUMIN/GLOB SERPL: 0.7 (ref 1.1–2.2)
ALP SERPL-CCNC: 105 U/L (ref 45–117)
ALT SERPL-CCNC: 20 U/L (ref 12–78)
ANION GAP SERPL CALC-SCNC: 8 MMOL/L (ref 5–15)
AST SERPL-CCNC: 9 U/L (ref 15–37)
BASOPHILS # BLD: 0.1 K/UL (ref 0–0.1)
BASOPHILS NFR BLD: 1 % (ref 0–1)
BILIRUB SERPL-MCNC: 0.5 MG/DL (ref 0.2–1)
BUN SERPL-MCNC: 81 MG/DL (ref 6–20)
BUN/CREAT SERPL: 21 (ref 12–20)
CALCIUM SERPL-MCNC: 9.3 MG/DL (ref 8.5–10.1)
CHLORIDE SERPL-SCNC: 103 MMOL/L (ref 97–108)
CO2 SERPL-SCNC: 24 MMOL/L (ref 21–32)
CREAT SERPL-MCNC: 3.89 MG/DL (ref 0.7–1.3)
DIFFERENTIAL METHOD BLD: ABNORMAL
EKG ATRIAL RATE: 276 BPM
EKG DIAGNOSIS: NORMAL
EKG P AXIS: 265 DEGREES
EKG Q-T INTERVAL: 270 MS
EKG QRS DURATION: 82 MS
EKG QTC CALCULATION (BAZETT): 409 MS
EKG R AXIS: 61 DEGREES
EKG T AXIS: -78 DEGREES
EKG VENTRICULAR RATE: 138 BPM
EOSINOPHIL # BLD: 0.4 K/UL (ref 0–0.4)
EOSINOPHIL NFR BLD: 4 % (ref 0–7)
ERYTHROCYTE [DISTWIDTH] IN BLOOD BY AUTOMATED COUNT: 14.3 % (ref 11.5–14.5)
GLOBULIN SER CALC-MCNC: 4.5 G/DL (ref 2–4)
GLUCOSE BLD STRIP.AUTO-MCNC: 113 MG/DL (ref 65–117)
GLUCOSE BLD STRIP.AUTO-MCNC: 189 MG/DL (ref 65–117)
GLUCOSE SERPL-MCNC: 140 MG/DL (ref 65–100)
HCT VFR BLD AUTO: 44.9 % (ref 36.6–50.3)
HGB BLD-MCNC: 14.3 G/DL (ref 12.1–17)
IMM GRANULOCYTES # BLD AUTO: 0.1 K/UL (ref 0–0.04)
IMM GRANULOCYTES NFR BLD AUTO: 1 % (ref 0–0.5)
LYMPHOCYTES # BLD: 2.5 K/UL (ref 0.8–3.5)
LYMPHOCYTES NFR BLD: 23 % (ref 12–49)
MCH RBC QN AUTO: 28.7 PG (ref 26–34)
MCHC RBC AUTO-ENTMCNC: 31.8 G/DL (ref 30–36.5)
MCV RBC AUTO: 90.2 FL (ref 80–99)
MONOCYTES # BLD: 0.8 K/UL (ref 0–1)
MONOCYTES NFR BLD: 8 % (ref 5–13)
NEUTS SEG # BLD: 6.8 K/UL (ref 1.8–8)
NEUTS SEG NFR BLD: 63 % (ref 32–75)
NRBC # BLD: 0 K/UL (ref 0–0.01)
NRBC BLD-RTO: 0 PER 100 WBC
NT PRO BNP: 1200 PG/ML
PLATELET # BLD AUTO: 223 K/UL (ref 150–400)
PMV BLD AUTO: 9.8 FL (ref 8.9–12.9)
POTASSIUM SERPL-SCNC: 4.9 MMOL/L (ref 3.5–5.1)
PROT SERPL-MCNC: 7.8 G/DL (ref 6.4–8.2)
RBC # BLD AUTO: 4.98 M/UL (ref 4.1–5.7)
SERVICE CMNT-IMP: ABNORMAL
SERVICE CMNT-IMP: NORMAL
SODIUM SERPL-SCNC: 135 MMOL/L (ref 136–145)
TROPONIN I SERPL HS-MCNC: 34 NG/L (ref 0–76)
WBC # BLD AUTO: 10.6 K/UL (ref 4.1–11.1)

## 2024-06-21 PROCEDURE — 85025 COMPLETE CBC W/AUTO DIFF WBC: CPT

## 2024-06-21 PROCEDURE — 2060000000 HC ICU INTERMEDIATE R&B

## 2024-06-21 PROCEDURE — 82962 GLUCOSE BLOOD TEST: CPT

## 2024-06-21 PROCEDURE — 6370000000 HC RX 637 (ALT 250 FOR IP): Performed by: STUDENT IN AN ORGANIZED HEALTH CARE EDUCATION/TRAINING PROGRAM

## 2024-06-21 PROCEDURE — 2500000003 HC RX 250 WO HCPCS: Performed by: STUDENT IN AN ORGANIZED HEALTH CARE EDUCATION/TRAINING PROGRAM

## 2024-06-21 PROCEDURE — 2580000003 HC RX 258: Performed by: STUDENT IN AN ORGANIZED HEALTH CARE EDUCATION/TRAINING PROGRAM

## 2024-06-21 PROCEDURE — 96374 THER/PROPH/DIAG INJ IV PUSH: CPT

## 2024-06-21 PROCEDURE — 99285 EMERGENCY DEPT VISIT HI MDM: CPT

## 2024-06-21 PROCEDURE — 96375 TX/PRO/DX INJ NEW DRUG ADDON: CPT

## 2024-06-21 PROCEDURE — 80053 COMPREHEN METABOLIC PANEL: CPT

## 2024-06-21 PROCEDURE — 71045 X-RAY EXAM CHEST 1 VIEW: CPT

## 2024-06-21 PROCEDURE — 83880 ASSAY OF NATRIURETIC PEPTIDE: CPT

## 2024-06-21 PROCEDURE — 36415 COLL VENOUS BLD VENIPUNCTURE: CPT

## 2024-06-21 PROCEDURE — 6360000002 HC RX W HCPCS: Performed by: STUDENT IN AN ORGANIZED HEALTH CARE EDUCATION/TRAINING PROGRAM

## 2024-06-21 PROCEDURE — 84484 ASSAY OF TROPONIN QUANT: CPT

## 2024-06-21 RX ORDER — IPRATROPIUM BROMIDE AND ALBUTEROL SULFATE 2.5; .5 MG/3ML; MG/3ML
1 SOLUTION RESPIRATORY (INHALATION) EVERY 4 HOURS PRN
Status: DISCONTINUED | OUTPATIENT
Start: 2024-06-21 | End: 2024-06-25 | Stop reason: HOSPADM

## 2024-06-21 RX ORDER — ACETAMINOPHEN 650 MG/1
650 SUPPOSITORY RECTAL EVERY 6 HOURS PRN
Status: DISCONTINUED | OUTPATIENT
Start: 2024-06-21 | End: 2024-06-25 | Stop reason: HOSPADM

## 2024-06-21 RX ORDER — SODIUM CHLORIDE 0.9 % (FLUSH) 0.9 %
5-40 SYRINGE (ML) INJECTION PRN
Status: DISCONTINUED | OUTPATIENT
Start: 2024-06-21 | End: 2024-06-25 | Stop reason: HOSPADM

## 2024-06-21 RX ORDER — ASPIRIN 81 MG/1
81 TABLET, CHEWABLE ORAL DAILY
Status: DISCONTINUED | OUTPATIENT
Start: 2024-06-21 | End: 2024-06-25 | Stop reason: HOSPADM

## 2024-06-21 RX ORDER — VALSARTAN AND HYDROCHLOROTHIAZIDE 320; 25 MG/1; MG/1
1 TABLET, FILM COATED ORAL DAILY
Status: DISCONTINUED | OUTPATIENT
Start: 2024-06-21 | End: 2024-06-21 | Stop reason: CLARIF

## 2024-06-21 RX ORDER — ONDANSETRON 4 MG/1
4 TABLET, ORALLY DISINTEGRATING ORAL EVERY 8 HOURS PRN
Status: DISCONTINUED | OUTPATIENT
Start: 2024-06-21 | End: 2024-06-25 | Stop reason: HOSPADM

## 2024-06-21 RX ORDER — INSULIN LISPRO 100 [IU]/ML
0-8 INJECTION, SOLUTION INTRAVENOUS; SUBCUTANEOUS
Status: DISCONTINUED | OUTPATIENT
Start: 2024-06-21 | End: 2024-06-25 | Stop reason: HOSPADM

## 2024-06-21 RX ORDER — INSULIN LISPRO 100 [IU]/ML
0-4 INJECTION, SOLUTION INTRAVENOUS; SUBCUTANEOUS NIGHTLY
Status: DISCONTINUED | OUTPATIENT
Start: 2024-06-21 | End: 2024-06-25 | Stop reason: HOSPADM

## 2024-06-21 RX ORDER — FUROSEMIDE 10 MG/ML
40 INJECTION INTRAMUSCULAR; INTRAVENOUS 2 TIMES DAILY
Status: DISCONTINUED | OUTPATIENT
Start: 2024-06-21 | End: 2024-06-21

## 2024-06-21 RX ORDER — GABAPENTIN 300 MG/1
300 CAPSULE ORAL 2 TIMES DAILY
Status: DISCONTINUED | OUTPATIENT
Start: 2024-06-21 | End: 2024-06-25 | Stop reason: HOSPADM

## 2024-06-21 RX ORDER — FUROSEMIDE 10 MG/ML
40 INJECTION INTRAMUSCULAR; INTRAVENOUS 2 TIMES DAILY
Status: DISCONTINUED | OUTPATIENT
Start: 2024-06-22 | End: 2024-06-25 | Stop reason: HOSPADM

## 2024-06-21 RX ORDER — VALSARTAN 160 MG/1
320 TABLET ORAL DAILY
Status: DISCONTINUED | OUTPATIENT
Start: 2024-06-21 | End: 2024-06-25 | Stop reason: HOSPADM

## 2024-06-21 RX ORDER — GLUCAGON 1 MG/ML
1 KIT INJECTION PRN
Status: DISCONTINUED | OUTPATIENT
Start: 2024-06-21 | End: 2024-06-25 | Stop reason: HOSPADM

## 2024-06-21 RX ORDER — FINERENONE 20 MG/1
1 TABLET, FILM COATED ORAL DAILY
COMMUNITY

## 2024-06-21 RX ORDER — ACETAMINOPHEN 325 MG/1
650 TABLET ORAL EVERY 6 HOURS PRN
Status: DISCONTINUED | OUTPATIENT
Start: 2024-06-21 | End: 2024-06-25 | Stop reason: HOSPADM

## 2024-06-21 RX ORDER — SODIUM CHLORIDE 9 MG/ML
INJECTION, SOLUTION INTRAVENOUS PRN
Status: DISCONTINUED | OUTPATIENT
Start: 2024-06-21 | End: 2024-06-25 | Stop reason: HOSPADM

## 2024-06-21 RX ORDER — AMIODARONE HYDROCHLORIDE 200 MG/1
200 TABLET ORAL DAILY
Status: DISCONTINUED | OUTPATIENT
Start: 2024-06-21 | End: 2024-06-25 | Stop reason: HOSPADM

## 2024-06-21 RX ORDER — SODIUM CHLORIDE 0.9 % (FLUSH) 0.9 %
5-40 SYRINGE (ML) INJECTION EVERY 12 HOURS SCHEDULED
Status: DISCONTINUED | OUTPATIENT
Start: 2024-06-21 | End: 2024-06-25 | Stop reason: HOSPADM

## 2024-06-21 RX ORDER — VALSARTAN AND HYDROCHLOROTHIAZIDE 320; 25 MG/1; MG/1
1 TABLET, FILM COATED ORAL DAILY
COMMUNITY

## 2024-06-21 RX ORDER — HYDROCHLOROTHIAZIDE 25 MG/1
25 TABLET ORAL DAILY
Status: DISCONTINUED | OUTPATIENT
Start: 2024-06-21 | End: 2024-06-25 | Stop reason: HOSPADM

## 2024-06-21 RX ORDER — DEXTROSE MONOHYDRATE 100 MG/ML
INJECTION, SOLUTION INTRAVENOUS CONTINUOUS PRN
Status: DISCONTINUED | OUTPATIENT
Start: 2024-06-21 | End: 2024-06-25 | Stop reason: HOSPADM

## 2024-06-21 RX ORDER — INSULIN GLARGINE 100 [IU]/ML
74 INJECTION, SOLUTION SUBCUTANEOUS DAILY
Status: DISCONTINUED | OUTPATIENT
Start: 2024-06-21 | End: 2024-06-25 | Stop reason: HOSPADM

## 2024-06-21 RX ORDER — NICOTINE 21 MG/24HR
1 PATCH, TRANSDERMAL 24 HOURS TRANSDERMAL DAILY
Status: DISCONTINUED | OUTPATIENT
Start: 2024-06-21 | End: 2024-06-25 | Stop reason: HOSPADM

## 2024-06-21 RX ORDER — INSULIN LISPRO 100 [IU]/ML
20 INJECTION, SOLUTION INTRAVENOUS; SUBCUTANEOUS
Status: DISCONTINUED | OUTPATIENT
Start: 2024-06-21 | End: 2024-06-25 | Stop reason: HOSPADM

## 2024-06-21 RX ORDER — CHOLECALCIFEROL (VITAMIN D3) 1250 MCG
1 CAPSULE ORAL DAILY
COMMUNITY

## 2024-06-21 RX ORDER — METOPROLOL TARTRATE 50 MG/1
50 TABLET, FILM COATED ORAL 2 TIMES DAILY
COMMUNITY

## 2024-06-21 RX ORDER — FUROSEMIDE 10 MG/ML
40 INJECTION INTRAMUSCULAR; INTRAVENOUS ONCE
Status: COMPLETED | OUTPATIENT
Start: 2024-06-21 | End: 2024-06-21

## 2024-06-21 RX ORDER — METOPROLOL TARTRATE 1 MG/ML
5 INJECTION, SOLUTION INTRAVENOUS ONCE
Status: COMPLETED | OUTPATIENT
Start: 2024-06-21 | End: 2024-06-21

## 2024-06-21 RX ORDER — ONDANSETRON 2 MG/ML
4 INJECTION INTRAMUSCULAR; INTRAVENOUS EVERY 6 HOURS PRN
Status: DISCONTINUED | OUTPATIENT
Start: 2024-06-21 | End: 2024-06-25 | Stop reason: HOSPADM

## 2024-06-21 RX ORDER — ATORVASTATIN CALCIUM 40 MG/1
40 TABLET, FILM COATED ORAL DAILY
Status: DISCONTINUED | OUTPATIENT
Start: 2024-06-21 | End: 2024-06-25 | Stop reason: HOSPADM

## 2024-06-21 RX ORDER — POLYETHYLENE GLYCOL 3350 17 G/17G
17 POWDER, FOR SOLUTION ORAL DAILY PRN
Status: DISCONTINUED | OUTPATIENT
Start: 2024-06-21 | End: 2024-06-25 | Stop reason: HOSPADM

## 2024-06-21 RX ADMIN — AMIODARONE HYDROCHLORIDE 0.5 MG/MIN: 50 INJECTION, SOLUTION INTRAVENOUS at 21:57

## 2024-06-21 RX ADMIN — ATORVASTATIN CALCIUM 40 MG: 40 TABLET, FILM COATED ORAL at 18:53

## 2024-06-21 RX ADMIN — APIXABAN 5 MG: 5 TABLET, FILM COATED ORAL at 21:50

## 2024-06-21 RX ADMIN — GABAPENTIN 300 MG: 300 CAPSULE ORAL at 21:50

## 2024-06-21 RX ADMIN — METOPROLOL TARTRATE 25 MG: 25 TABLET, FILM COATED ORAL at 21:50

## 2024-06-21 RX ADMIN — INSULIN GLARGINE 74 UNITS: 100 INJECTION, SOLUTION SUBCUTANEOUS at 18:54

## 2024-06-21 RX ADMIN — INSULIN LISPRO 20 UNITS: 100 INJECTION, SOLUTION INTRAVENOUS; SUBCUTANEOUS at 18:45

## 2024-06-21 RX ADMIN — METOPROLOL TARTRATE 5 MG: 5 INJECTION INTRAVENOUS at 14:03

## 2024-06-21 RX ADMIN — FUROSEMIDE 40 MG: 10 INJECTION, SOLUTION INTRAMUSCULAR; INTRAVENOUS at 14:03

## 2024-06-21 RX ADMIN — AMIODARONE HYDROCHLORIDE 1 MG/MIN: 50 INJECTION, SOLUTION INTRAVENOUS at 15:49

## 2024-06-21 RX ADMIN — SODIUM CHLORIDE, PRESERVATIVE FREE 10 ML: 5 INJECTION INTRAVENOUS at 21:53

## 2024-06-21 ASSESSMENT — LIFESTYLE VARIABLES
HOW MANY STANDARD DRINKS CONTAINING ALCOHOL DO YOU HAVE ON A TYPICAL DAY: 1 OR 2
HOW OFTEN DO YOU HAVE A DRINK CONTAINING ALCOHOL: 2-4 TIMES A MONTH

## 2024-06-21 NOTE — ED NOTES
TRANSFER - OUT REPORT:    Verbal report given to RN Billy on Mahin Araya  being transferred to [facility] for routine progression of patient care       Report consisted of patient's Situation, Background, Assessment and   Recommendations(SBAR).     Information from the following report(s) Nurse Handoff Report, ED Encounter Summary, ED SBAR, MAR, and Cardiac Rhythm A-flutter  was reviewed with the receiving nurse.    [place] Fall Assessment:    Presents to emergency department  because of falls (Syncope, seizure, or loss of consciousness): No  Age > 70: No  Altered Mental Status, Intoxication with alcohol or substance confusion (Disorientation, impaired judgment, poor safety awaremess, or inability to follow instructions): No  Impaired Mobility: Ambulates or transfers with assistive devices or assistance; Unable to ambulate or transer.: No  Nursing Judgement: Yes          Lines:   Peripheral IV 06/21/24 Right Antecubital (Active)        Opportunity for questions and clarification was provided.      Patient transported with:  Monitor and Tech

## 2024-06-21 NOTE — H&P
145 mmol/L    Potassium 4.9 3.5 - 5.1 mmol/L    Chloride 103 97 - 108 mmol/L    CO2 24 21 - 32 mmol/L    Anion Gap 8 5 - 15 mmol/L    Glucose 140 (H) 65 - 100 mg/dL    BUN 81 (H) 6 - 20 MG/DL    Creatinine 3.89 (H) 0.70 - 1.30 MG/DL    BUN/Creatinine Ratio 21 (H) 12 - 20      Est, Glom Filt Rate 16 (L) >60 ml/min/1.73m2    Calcium 9.3 8.5 - 10.1 MG/DL    Total Bilirubin 0.5 0.2 - 1.0 MG/DL    ALT 20 12 - 78 U/L    AST 9 (L) 15 - 37 U/L    Alk Phosphatase 105 45 - 117 U/L    Total Protein 7.8 6.4 - 8.2 g/dL    Albumin 3.3 (L) 3.5 - 5.0 g/dL    Globulin 4.5 (H) 2.0 - 4.0 g/dL    Albumin/Globulin Ratio 0.7 (L) 1.1 - 2.2     Troponin    Collection Time: 06/21/24  1:50 PM   Result Value Ref Range    Troponin, High Sensitivity 34 0 - 76 ng/L   Brain Natriuretic Peptide    Collection Time: 06/21/24  1:50 PM   Result Value Ref Range    NT Pro-BNP 1,200 (H) <125 PG/ML         XR CHEST PORTABLE    Result Date: 6/21/2024  INDICATION: tachycardia  COMPARISON: 3/21/2024 FINDINGS: AP portable view of the chest demonstrates a normal cardiomediastinal silhouette. There is no edema, effusion, consolidation, or pneumothorax. The osseous structures are unremarkable.     No acute process. Electronically signed by Pablo Hightower     _______________________________________________________________________    TOTAL TIME:  76 Minutes    Critical Care Provided     Minutes non procedure based    Signed: Lissett Marlow MD    Procedures: see electronic medical records for all procedures/Xrays and details which were not copied into this note but were reviewed prior to creation of Plan.

## 2024-06-22 LAB
ALBUMIN SERPL-MCNC: 2.7 G/DL (ref 3.5–5)
ALBUMIN/GLOB SERPL: 0.7 (ref 1.1–2.2)
ALP SERPL-CCNC: 98 U/L (ref 45–117)
ALT SERPL-CCNC: 17 U/L (ref 12–78)
ANION GAP SERPL CALC-SCNC: 7 MMOL/L (ref 5–15)
AST SERPL-CCNC: 10 U/L (ref 15–37)
BASOPHILS # BLD: 0.1 K/UL (ref 0–0.1)
BASOPHILS NFR BLD: 1 % (ref 0–1)
BILIRUB SERPL-MCNC: 0.6 MG/DL (ref 0.2–1)
BUN SERPL-MCNC: 83 MG/DL (ref 6–20)
BUN/CREAT SERPL: 21 (ref 12–20)
CALCIUM SERPL-MCNC: 8.8 MG/DL (ref 8.5–10.1)
CHLORIDE SERPL-SCNC: 105 MMOL/L (ref 97–108)
CO2 SERPL-SCNC: 23 MMOL/L (ref 21–32)
CREAT SERPL-MCNC: 3.93 MG/DL (ref 0.7–1.3)
DIFFERENTIAL METHOD BLD: ABNORMAL
EOSINOPHIL # BLD: 0.6 K/UL (ref 0–0.4)
EOSINOPHIL NFR BLD: 5 % (ref 0–7)
ERYTHROCYTE [DISTWIDTH] IN BLOOD BY AUTOMATED COUNT: 14.5 % (ref 11.5–14.5)
GLOBULIN SER CALC-MCNC: 3.9 G/DL (ref 2–4)
GLUCOSE BLD STRIP.AUTO-MCNC: 257 MG/DL (ref 65–117)
GLUCOSE BLD STRIP.AUTO-MCNC: 348 MG/DL (ref 65–117)
GLUCOSE BLD STRIP.AUTO-MCNC: 352 MG/DL (ref 65–117)
GLUCOSE BLD STRIP.AUTO-MCNC: 356 MG/DL (ref 65–117)
GLUCOSE BLD STRIP.AUTO-MCNC: 84 MG/DL (ref 65–117)
GLUCOSE BLD STRIP.AUTO-MCNC: 94 MG/DL (ref 65–117)
GLUCOSE SERPL-MCNC: 128 MG/DL (ref 65–100)
HCT VFR BLD AUTO: 41 % (ref 36.6–50.3)
HGB BLD-MCNC: 13.2 G/DL (ref 12.1–17)
IMM GRANULOCYTES # BLD AUTO: 0.1 K/UL (ref 0–0.04)
IMM GRANULOCYTES NFR BLD AUTO: 1 % (ref 0–0.5)
LYMPHOCYTES # BLD: 1.8 K/UL (ref 0.8–3.5)
LYMPHOCYTES NFR BLD: 16 % (ref 12–49)
MCH RBC QN AUTO: 28.8 PG (ref 26–34)
MCHC RBC AUTO-ENTMCNC: 32.2 G/DL (ref 30–36.5)
MCV RBC AUTO: 89.5 FL (ref 80–99)
MONOCYTES # BLD: 1.1 K/UL (ref 0–1)
MONOCYTES NFR BLD: 10 % (ref 5–13)
NEUTS SEG # BLD: 7.3 K/UL (ref 1.8–8)
NEUTS SEG NFR BLD: 67 % (ref 32–75)
NRBC # BLD: 0 K/UL (ref 0–0.01)
NRBC BLD-RTO: 0 PER 100 WBC
PLATELET # BLD AUTO: 207 K/UL (ref 150–400)
PMV BLD AUTO: 10.1 FL (ref 8.9–12.9)
POTASSIUM SERPL-SCNC: 4.4 MMOL/L (ref 3.5–5.1)
PROT SERPL-MCNC: 6.6 G/DL (ref 6.4–8.2)
RBC # BLD AUTO: 4.58 M/UL (ref 4.1–5.7)
SERVICE CMNT-IMP: ABNORMAL
SERVICE CMNT-IMP: NORMAL
SERVICE CMNT-IMP: NORMAL
SODIUM SERPL-SCNC: 135 MMOL/L (ref 136–145)
WBC # BLD AUTO: 10.9 K/UL (ref 4.1–11.1)

## 2024-06-22 PROCEDURE — 82962 GLUCOSE BLOOD TEST: CPT

## 2024-06-22 PROCEDURE — 6370000000 HC RX 637 (ALT 250 FOR IP): Performed by: STUDENT IN AN ORGANIZED HEALTH CARE EDUCATION/TRAINING PROGRAM

## 2024-06-22 PROCEDURE — 2580000003 HC RX 258: Performed by: STUDENT IN AN ORGANIZED HEALTH CARE EDUCATION/TRAINING PROGRAM

## 2024-06-22 PROCEDURE — 85025 COMPLETE CBC W/AUTO DIFF WBC: CPT

## 2024-06-22 PROCEDURE — 2580000003 HC RX 258: Performed by: INTERNAL MEDICINE

## 2024-06-22 PROCEDURE — 6360000002 HC RX W HCPCS: Performed by: INTERNAL MEDICINE

## 2024-06-22 PROCEDURE — 2500000003 HC RX 250 WO HCPCS: Performed by: NURSE PRACTITIONER

## 2024-06-22 PROCEDURE — 2580000003 HC RX 258: Performed by: NURSE PRACTITIONER

## 2024-06-22 PROCEDURE — 2060000000 HC ICU INTERMEDIATE R&B

## 2024-06-22 PROCEDURE — 6360000002 HC RX W HCPCS: Performed by: STUDENT IN AN ORGANIZED HEALTH CARE EDUCATION/TRAINING PROGRAM

## 2024-06-22 PROCEDURE — 80053 COMPREHEN METABOLIC PANEL: CPT

## 2024-06-22 PROCEDURE — 36415 COLL VENOUS BLD VENIPUNCTURE: CPT

## 2024-06-22 RX ORDER — METOPROLOL TARTRATE 1 MG/ML
2.5 INJECTION, SOLUTION INTRAVENOUS ONCE
Status: COMPLETED | OUTPATIENT
Start: 2024-06-22 | End: 2024-06-22

## 2024-06-22 RX ORDER — ENOXAPARIN SODIUM 150 MG/ML
1 INJECTION SUBCUTANEOUS DAILY
Status: DISCONTINUED | OUTPATIENT
Start: 2024-06-23 | End: 2024-06-25

## 2024-06-22 RX ORDER — 0.9 % SODIUM CHLORIDE 0.9 %
250 INTRAVENOUS SOLUTION INTRAVENOUS ONCE
Status: COMPLETED | OUTPATIENT
Start: 2024-06-22 | End: 2024-06-22

## 2024-06-22 RX ORDER — DILTIAZEM HYDROCHLORIDE 5 MG/ML
10 INJECTION INTRAVENOUS ONCE
Status: COMPLETED | OUTPATIENT
Start: 2024-06-22 | End: 2024-06-22

## 2024-06-22 RX ORDER — INSULIN GLARGINE 100 [IU]/ML
35 INJECTION, SOLUTION SUBCUTANEOUS ONCE
Status: COMPLETED | OUTPATIENT
Start: 2024-06-22 | End: 2024-06-22

## 2024-06-22 RX ADMIN — SODIUM CHLORIDE: 9 INJECTION, SOLUTION INTRAVENOUS at 00:10

## 2024-06-22 RX ADMIN — HYALURONIDASE 150 UNITS: 150 INJECTION SUBCUTANEOUS at 00:47

## 2024-06-22 RX ADMIN — SODIUM CHLORIDE, PRESERVATIVE FREE 10 ML: 5 INJECTION INTRAVENOUS at 10:02

## 2024-06-22 RX ADMIN — SODIUM CHLORIDE 5 MG/HR: 900 INJECTION, SOLUTION INTRAVENOUS at 02:52

## 2024-06-22 RX ADMIN — DILTIAZEM HYDROCHLORIDE 10 MG: 5 INJECTION INTRAVENOUS at 02:46

## 2024-06-22 RX ADMIN — SODIUM CHLORIDE 10 MG/HR: 900 INJECTION, SOLUTION INTRAVENOUS at 17:41

## 2024-06-22 RX ADMIN — SODIUM CHLORIDE, PRESERVATIVE FREE 10 ML: 5 INJECTION INTRAVENOUS at 21:49

## 2024-06-22 RX ADMIN — GABAPENTIN 300 MG: 300 CAPSULE ORAL at 10:26

## 2024-06-22 RX ADMIN — METOPROLOL TARTRATE 2.5 MG: 5 INJECTION INTRAVENOUS at 02:59

## 2024-06-22 RX ADMIN — AMIODARONE HYDROCHLORIDE 150 MG: 1.5 INJECTION, SOLUTION INTRAVENOUS at 00:12

## 2024-06-22 RX ADMIN — SODIUM CHLORIDE 250 ML: 9 INJECTION, SOLUTION INTRAVENOUS at 02:41

## 2024-06-22 RX ADMIN — ATORVASTATIN CALCIUM 40 MG: 40 TABLET, FILM COATED ORAL at 10:00

## 2024-06-22 RX ADMIN — GABAPENTIN 300 MG: 300 CAPSULE ORAL at 21:47

## 2024-06-22 RX ADMIN — EMPAGLIFLOZIN 10 MG: 10 TABLET, FILM COATED ORAL at 10:25

## 2024-06-22 RX ADMIN — INSULIN GLARGINE 35 UNITS: 100 INJECTION, SOLUTION SUBCUTANEOUS at 10:26

## 2024-06-22 RX ADMIN — AMIODARONE HYDROCHLORIDE 1 MG/MIN: 50 INJECTION, SOLUTION INTRAVENOUS at 22:44

## 2024-06-22 RX ADMIN — SODIUM CHLORIDE 10 MG/HR: 900 INJECTION, SOLUTION INTRAVENOUS at 20:57

## 2024-06-22 RX ADMIN — INSULIN LISPRO 20 UNITS: 100 INJECTION, SOLUTION INTRAVENOUS; SUBCUTANEOUS at 17:42

## 2024-06-22 RX ADMIN — SODIUM CHLORIDE 2.5 MG/HR: 900 INJECTION, SOLUTION INTRAVENOUS at 15:38

## 2024-06-22 RX ADMIN — AMIODARONE HYDROCHLORIDE 1 MG/MIN: 50 INJECTION, SOLUTION INTRAVENOUS at 14:51

## 2024-06-22 RX ADMIN — APIXABAN 5 MG: 5 TABLET, FILM COATED ORAL at 10:00

## 2024-06-22 RX ADMIN — INSULIN LISPRO 6 UNITS: 100 INJECTION, SOLUTION INTRAVENOUS; SUBCUTANEOUS at 17:42

## 2024-06-22 RX ADMIN — ASPIRIN 81 MG: 81 TABLET, CHEWABLE ORAL at 10:01

## 2024-06-22 RX ADMIN — SODIUM CHLORIDE 12.5 MG/HR: 900 INJECTION, SOLUTION INTRAVENOUS at 09:59

## 2024-06-22 ASSESSMENT — PAIN SCALES - GENERAL
PAINLEVEL_OUTOF10: 0

## 2024-06-22 NOTE — ED PROVIDER NOTES
Quite often unanticipated grammatical, syntax, homophones, and other interpretive errors are inadvertently transcribed by the computer software. Please disregards these errors. Please excuse any errors that have escaped final proofreading.)         Mahin Gonzalez MD  06/21/24 2100

## 2024-06-23 LAB
ALBUMIN SERPL-MCNC: 2.9 G/DL (ref 3.5–5)
ALBUMIN/GLOB SERPL: 0.7 (ref 1.1–2.2)
ALP SERPL-CCNC: 104 U/L (ref 45–117)
ALT SERPL-CCNC: 16 U/L (ref 12–78)
ANION GAP SERPL CALC-SCNC: 9 MMOL/L (ref 5–15)
AST SERPL-CCNC: 13 U/L (ref 15–37)
BILIRUB SERPL-MCNC: 0.5 MG/DL (ref 0.2–1)
BUN SERPL-MCNC: 77 MG/DL (ref 6–20)
BUN/CREAT SERPL: 19 (ref 12–20)
CALCIUM SERPL-MCNC: 9.2 MG/DL (ref 8.5–10.1)
CHLORIDE SERPL-SCNC: 106 MMOL/L (ref 97–108)
CO2 SERPL-SCNC: 21 MMOL/L (ref 21–32)
CREAT SERPL-MCNC: 3.97 MG/DL (ref 0.7–1.3)
ERYTHROCYTE [DISTWIDTH] IN BLOOD BY AUTOMATED COUNT: 14.1 % (ref 11.5–14.5)
GLOBULIN SER CALC-MCNC: 4.1 G/DL (ref 2–4)
GLUCOSE BLD STRIP.AUTO-MCNC: 128 MG/DL (ref 65–117)
GLUCOSE BLD STRIP.AUTO-MCNC: 135 MG/DL (ref 65–117)
GLUCOSE BLD STRIP.AUTO-MCNC: 169 MG/DL (ref 65–117)
GLUCOSE BLD STRIP.AUTO-MCNC: 215 MG/DL (ref 65–117)
GLUCOSE SERPL-MCNC: 110 MG/DL (ref 65–100)
HCT VFR BLD AUTO: 41.5 % (ref 36.6–50.3)
HGB BLD-MCNC: 13.1 G/DL (ref 12.1–17)
MAGNESIUM SERPL-MCNC: 2.1 MG/DL (ref 1.6–2.4)
MCH RBC QN AUTO: 28.5 PG (ref 26–34)
MCHC RBC AUTO-ENTMCNC: 31.6 G/DL (ref 30–36.5)
MCV RBC AUTO: 90.4 FL (ref 80–99)
NRBC # BLD: 0 K/UL (ref 0–0.01)
NRBC BLD-RTO: 0 PER 100 WBC
PHOSPHATE SERPL-MCNC: 5 MG/DL (ref 2.6–4.7)
PLATELET # BLD AUTO: 204 K/UL (ref 150–400)
PMV BLD AUTO: 9.9 FL (ref 8.9–12.9)
POTASSIUM SERPL-SCNC: 4.4 MMOL/L (ref 3.5–5.1)
PROT SERPL-MCNC: 7 G/DL (ref 6.4–8.2)
RBC # BLD AUTO: 4.59 M/UL (ref 4.1–5.7)
SERVICE CMNT-IMP: ABNORMAL
SODIUM SERPL-SCNC: 136 MMOL/L (ref 136–145)
WBC # BLD AUTO: 8.9 K/UL (ref 4.1–11.1)

## 2024-06-23 PROCEDURE — 2580000003 HC RX 258: Performed by: INTERNAL MEDICINE

## 2024-06-23 PROCEDURE — 2500000003 HC RX 250 WO HCPCS: Performed by: NURSE PRACTITIONER

## 2024-06-23 PROCEDURE — 6370000000 HC RX 637 (ALT 250 FOR IP): Performed by: STUDENT IN AN ORGANIZED HEALTH CARE EDUCATION/TRAINING PROGRAM

## 2024-06-23 PROCEDURE — 2580000003 HC RX 258: Performed by: STUDENT IN AN ORGANIZED HEALTH CARE EDUCATION/TRAINING PROGRAM

## 2024-06-23 PROCEDURE — 2060000000 HC ICU INTERMEDIATE R&B

## 2024-06-23 PROCEDURE — 84100 ASSAY OF PHOSPHORUS: CPT

## 2024-06-23 PROCEDURE — 85027 COMPLETE CBC AUTOMATED: CPT

## 2024-06-23 PROCEDURE — 6360000002 HC RX W HCPCS: Performed by: STUDENT IN AN ORGANIZED HEALTH CARE EDUCATION/TRAINING PROGRAM

## 2024-06-23 PROCEDURE — 82962 GLUCOSE BLOOD TEST: CPT

## 2024-06-23 PROCEDURE — 2500000003 HC RX 250 WO HCPCS: Performed by: STUDENT IN AN ORGANIZED HEALTH CARE EDUCATION/TRAINING PROGRAM

## 2024-06-23 PROCEDURE — 36415 COLL VENOUS BLD VENIPUNCTURE: CPT

## 2024-06-23 PROCEDURE — 2580000003 HC RX 258: Performed by: NURSE PRACTITIONER

## 2024-06-23 PROCEDURE — 83735 ASSAY OF MAGNESIUM: CPT

## 2024-06-23 PROCEDURE — 80053 COMPREHEN METABOLIC PANEL: CPT

## 2024-06-23 PROCEDURE — 6360000002 HC RX W HCPCS: Performed by: INTERNAL MEDICINE

## 2024-06-23 RX ORDER — INSULIN GLARGINE 100 [IU]/ML
35 INJECTION, SOLUTION SUBCUTANEOUS ONCE
Status: COMPLETED | OUTPATIENT
Start: 2024-06-23 | End: 2024-06-23

## 2024-06-23 RX ADMIN — AMIODARONE HYDROCHLORIDE 1 MG/MIN: 50 INJECTION, SOLUTION INTRAVENOUS at 06:43

## 2024-06-23 RX ADMIN — METOPROLOL TARTRATE 25 MG: 25 TABLET, FILM COATED ORAL at 20:59

## 2024-06-23 RX ADMIN — GABAPENTIN 300 MG: 300 CAPSULE ORAL at 10:28

## 2024-06-23 RX ADMIN — INSULIN GLARGINE 35 UNITS: 100 INJECTION, SOLUTION SUBCUTANEOUS at 10:39

## 2024-06-23 RX ADMIN — INSULIN LISPRO 2 UNITS: 100 INJECTION, SOLUTION INTRAVENOUS; SUBCUTANEOUS at 17:24

## 2024-06-23 RX ADMIN — SODIUM CHLORIDE 5 MG/HR: 900 INJECTION, SOLUTION INTRAVENOUS at 19:06

## 2024-06-23 RX ADMIN — INSULIN LISPRO 20 UNITS: 100 INJECTION, SOLUTION INTRAVENOUS; SUBCUTANEOUS at 17:24

## 2024-06-23 RX ADMIN — AMIODARONE HYDROCHLORIDE 0.5 MG/MIN: 50 INJECTION, SOLUTION INTRAVENOUS at 17:07

## 2024-06-23 RX ADMIN — EMPAGLIFLOZIN 10 MG: 10 TABLET, FILM COATED ORAL at 10:28

## 2024-06-23 RX ADMIN — SODIUM CHLORIDE 7.5 MG/HR: 900 INJECTION, SOLUTION INTRAVENOUS at 06:40

## 2024-06-23 RX ADMIN — ENOXAPARIN SODIUM 150 MG: 150 INJECTION SUBCUTANEOUS at 10:38

## 2024-06-23 RX ADMIN — METOPROLOL TARTRATE 25 MG: 25 TABLET, FILM COATED ORAL at 10:29

## 2024-06-23 RX ADMIN — SODIUM CHLORIDE, PRESERVATIVE FREE 10 ML: 5 INJECTION INTRAVENOUS at 10:33

## 2024-06-23 RX ADMIN — ATORVASTATIN CALCIUM 40 MG: 40 TABLET, FILM COATED ORAL at 10:28

## 2024-06-23 RX ADMIN — ASPIRIN 81 MG: 81 TABLET, CHEWABLE ORAL at 10:28

## 2024-06-23 RX ADMIN — GABAPENTIN 300 MG: 300 CAPSULE ORAL at 21:00

## 2024-06-23 ASSESSMENT — PAIN SCALES - GENERAL
PAINLEVEL_OUTOF10: 0

## 2024-06-24 LAB
ALBUMIN SERPL-MCNC: 2.9 G/DL (ref 3.5–5)
ALBUMIN/GLOB SERPL: 0.7 (ref 1.1–2.2)
ALP SERPL-CCNC: 105 U/L (ref 45–117)
ALT SERPL-CCNC: 17 U/L (ref 12–78)
ANION GAP SERPL CALC-SCNC: 5 MMOL/L (ref 5–15)
AST SERPL-CCNC: 10 U/L (ref 15–37)
BILIRUB SERPL-MCNC: 0.5 MG/DL (ref 0.2–1)
BUN SERPL-MCNC: 71 MG/DL (ref 6–20)
BUN/CREAT SERPL: 20 (ref 12–20)
CALCIUM SERPL-MCNC: 9.1 MG/DL (ref 8.5–10.1)
CHLORIDE SERPL-SCNC: 106 MMOL/L (ref 97–108)
CO2 SERPL-SCNC: 25 MMOL/L (ref 21–32)
CREAT SERPL-MCNC: 3.54 MG/DL (ref 0.7–1.3)
ECHO BSA: 2.75 M2
ERYTHROCYTE [DISTWIDTH] IN BLOOD BY AUTOMATED COUNT: 14.2 % (ref 11.5–14.5)
GLOBULIN SER CALC-MCNC: 4 G/DL (ref 2–4)
GLUCOSE BLD STRIP.AUTO-MCNC: 154 MG/DL (ref 65–117)
GLUCOSE BLD STRIP.AUTO-MCNC: 169 MG/DL (ref 65–117)
GLUCOSE BLD STRIP.AUTO-MCNC: 303 MG/DL (ref 65–117)
GLUCOSE BLD STRIP.AUTO-MCNC: 313 MG/DL (ref 65–117)
GLUCOSE BLD STRIP.AUTO-MCNC: 98 MG/DL (ref 65–117)
GLUCOSE SERPL-MCNC: 88 MG/DL (ref 65–100)
HCT VFR BLD AUTO: 42.2 % (ref 36.6–50.3)
HGB BLD-MCNC: 13.3 G/DL (ref 12.1–17)
MAGNESIUM SERPL-MCNC: 2.4 MG/DL (ref 1.6–2.4)
MCH RBC QN AUTO: 29 PG (ref 26–34)
MCHC RBC AUTO-ENTMCNC: 31.5 G/DL (ref 30–36.5)
MCV RBC AUTO: 91.9 FL (ref 80–99)
NRBC # BLD: 0 K/UL (ref 0–0.01)
NRBC BLD-RTO: 0 PER 100 WBC
PHOSPHATE SERPL-MCNC: 4.7 MG/DL (ref 2.6–4.7)
PLATELET # BLD AUTO: 183 K/UL (ref 150–400)
PMV BLD AUTO: 9.7 FL (ref 8.9–12.9)
POTASSIUM SERPL-SCNC: 4.6 MMOL/L (ref 3.5–5.1)
PROT SERPL-MCNC: 6.9 G/DL (ref 6.4–8.2)
RBC # BLD AUTO: 4.59 M/UL (ref 4.1–5.7)
SERVICE CMNT-IMP: ABNORMAL
SERVICE CMNT-IMP: NORMAL
SODIUM SERPL-SCNC: 136 MMOL/L (ref 136–145)
WBC # BLD AUTO: 8 K/UL (ref 4.1–11.1)

## 2024-06-24 PROCEDURE — 6360000002 HC RX W HCPCS: Performed by: INTERNAL MEDICINE

## 2024-06-24 PROCEDURE — 2500000003 HC RX 250 WO HCPCS: Performed by: STUDENT IN AN ORGANIZED HEALTH CARE EDUCATION/TRAINING PROGRAM

## 2024-06-24 PROCEDURE — 6370000000 HC RX 637 (ALT 250 FOR IP): Performed by: STUDENT IN AN ORGANIZED HEALTH CARE EDUCATION/TRAINING PROGRAM

## 2024-06-24 PROCEDURE — 6360000002 HC RX W HCPCS: Performed by: STUDENT IN AN ORGANIZED HEALTH CARE EDUCATION/TRAINING PROGRAM

## 2024-06-24 PROCEDURE — 36415 COLL VENOUS BLD VENIPUNCTURE: CPT

## 2024-06-24 PROCEDURE — 85027 COMPLETE CBC AUTOMATED: CPT

## 2024-06-24 PROCEDURE — 80053 COMPREHEN METABOLIC PANEL: CPT

## 2024-06-24 PROCEDURE — 92960 CARDIOVERSION ELECTRIC EXT: CPT | Performed by: INTERNAL MEDICINE

## 2024-06-24 PROCEDURE — 2580000003 HC RX 258: Performed by: STUDENT IN AN ORGANIZED HEALTH CARE EDUCATION/TRAINING PROGRAM

## 2024-06-24 PROCEDURE — 84100 ASSAY OF PHOSPHORUS: CPT

## 2024-06-24 PROCEDURE — 82962 GLUCOSE BLOOD TEST: CPT

## 2024-06-24 PROCEDURE — 2060000000 HC ICU INTERMEDIATE R&B

## 2024-06-24 PROCEDURE — 83735 ASSAY OF MAGNESIUM: CPT

## 2024-06-24 RX ORDER — FENTANYL CITRATE 50 UG/ML
INJECTION, SOLUTION INTRAMUSCULAR; INTRAVENOUS PRN
Status: DISCONTINUED | OUTPATIENT
Start: 2024-06-24 | End: 2024-06-24 | Stop reason: HOSPADM

## 2024-06-24 RX ORDER — MIDAZOLAM HYDROCHLORIDE 1 MG/ML
INJECTION INTRAMUSCULAR; INTRAVENOUS PRN
Status: DISCONTINUED | OUTPATIENT
Start: 2024-06-24 | End: 2024-06-24 | Stop reason: HOSPADM

## 2024-06-24 RX ADMIN — EMPAGLIFLOZIN 10 MG: 10 TABLET, FILM COATED ORAL at 09:15

## 2024-06-24 RX ADMIN — AMIODARONE HYDROCHLORIDE 0.5 MG/MIN: 50 INJECTION, SOLUTION INTRAVENOUS at 21:41

## 2024-06-24 RX ADMIN — ASPIRIN 81 MG: 81 TABLET, CHEWABLE ORAL at 09:15

## 2024-06-24 RX ADMIN — SODIUM CHLORIDE, PRESERVATIVE FREE 10 ML: 5 INJECTION INTRAVENOUS at 09:17

## 2024-06-24 RX ADMIN — METOPROLOL TARTRATE 25 MG: 25 TABLET, FILM COATED ORAL at 09:15

## 2024-06-24 RX ADMIN — ENOXAPARIN SODIUM 150 MG: 150 INJECTION SUBCUTANEOUS at 09:17

## 2024-06-24 RX ADMIN — INSULIN LISPRO 4 UNITS: 100 INJECTION, SOLUTION INTRAVENOUS; SUBCUTANEOUS at 20:59

## 2024-06-24 RX ADMIN — SODIUM CHLORIDE 5 MG/HR: 900 INJECTION, SOLUTION INTRAVENOUS at 08:28

## 2024-06-24 RX ADMIN — METOPROLOL TARTRATE 25 MG: 25 TABLET, FILM COATED ORAL at 20:59

## 2024-06-24 RX ADMIN — INSULIN LISPRO 20 UNITS: 100 INJECTION, SOLUTION INTRAVENOUS; SUBCUTANEOUS at 18:52

## 2024-06-24 RX ADMIN — AMIODARONE HYDROCHLORIDE 0.5 MG/MIN: 50 INJECTION, SOLUTION INTRAVENOUS at 08:34

## 2024-06-24 RX ADMIN — ATORVASTATIN CALCIUM 40 MG: 40 TABLET, FILM COATED ORAL at 09:14

## 2024-06-24 RX ADMIN — GABAPENTIN 300 MG: 300 CAPSULE ORAL at 20:58

## 2024-06-24 RX ADMIN — INSULIN GLARGINE 74 UNITS: 100 INJECTION, SOLUTION SUBCUTANEOUS at 09:15

## 2024-06-24 RX ADMIN — GABAPENTIN 300 MG: 300 CAPSULE ORAL at 09:14

## 2024-06-24 ASSESSMENT — PAIN SCALES - GENERAL
PAINLEVEL_OUTOF10: 0

## 2024-06-24 NOTE — CARE COORDINATION
Care Management Initial Assessment       RUR:15%  Readmission? No  1st IM letter given? N/a  1st  letter given: No     06/24/24 1529   Service Assessment   Patient Orientation Alert and Oriented   Cognition Alert   History Provided By Patient   Primary Caregiver Self   Support Systems Spouse/Significant Other   Patient's Healthcare Decision Maker is: Legal Next of Kin   PCP Verified by CM Yes   Last Visit to PCP Within last 3 months   Prior Functional Level Independent in ADLs/IADLs   Current Functional Level Independent in ADLs/IADLs   Can patient return to prior living arrangement Yes   Ability to make needs known: Good   Financial Resources Other (Comment)   Social/Functional History   Lives With Significant other   Bathroom Shower/Tub None   Home Equipment Oxygen   ADL Assistance Independent   Homemaking Assistance Independent   Ambulation Assistance Independent   Transfer Assistance Independent   Active  Yes   Mode of Transportation Car   Occupation Part time employment   Discharge Planning   Living Arrangements Spouse/Significant Other   Current Services Prior To Admission Oxygen Therapy   DME Ordered? No   Services At/After Discharge    Resource Information Provided? No     CM met with patient. He is independent at home, works, drives  Lives with his significant other  02 was provided to him on last admission but he does not use it. He has a pulse ox and it shows he has adequate 02 levels with out it; he does not know the company that supplied it.    He has been on Eliquis at home already    CM will be available to follow if needs arise.     English Renetta CHENG CM   1429    Advance Care Planning     General Advance Care Planning (ACP) Conversation    Date of Conversation: 6/24/2024  Conducted with: Patient with Decision Making Capacity  Other persons present: None    Healthcare Decision Maker: No healthcare decision makers have been documented.  Click here to complete HealthCare Decision

## 2024-06-24 NOTE — CONSULTS
[facility] EP/ ARRHYTHMIA/ CARDIOLOGY CONSULT      EP Cardiology consult requested by Lissett Marlow MD for atrial flutter  PCP:  Martin Woods MD    Primary cardiologist: Dr. Bowser     Electrophysiology Service  6/24/2024     Assessment:   Atrial flutter  With rapid ventricular response resulting in HFrEF  On Eliquis  Was cardioverted earlier by Matilde Bowser  HFrEF  Likely in the setting of rapid ventricular response   DM2  CKD  PAD  COPD    Plan:   Will arrange for flutter ablation so he can come off amiodarone  Risks benefits and alternatives discussed with patient.   HF managed by Dr. Bowser.   I spent 42 minutes providing care to this acutely ill inpatient with > 50% of the time counseling as well as reviewing the patient's chart, notes, labs, medications and preparing documentation along with assisting in the coordination of care of the patient on the patient's hospital floor/unit.         []    High complexity decision making was performed  []    Patient is at high-risk of decompensation with multiple organ involvement       HPI:  Mahin Araya is a 63 y.o. male with history of paroxysmal atrial flutter with rapid ventricular response and tachycardia induced cardiomyopathy who was referred to me by Dr. Bowser for atrial flutter ablation. He has had several presentations in the last year with atrial flutter and rapid ventricular response and he has what appears to be tachycardia induced cardiomyopathy. His EF in March was 35%. He sees Dr. Bowser in clinic.      No Known Allergies  Past Medical History:   Diagnosis Date    Cellulitis of left foot     Charcot's joint of foot due to diabetes (HCC)     Left    DM (diabetes mellitus) (HCC)     HTN (hypertension)     Iliac artery stenosis, left (HCC)     Lymphedema of both lower extremities     Smoker      Past Surgical History:   Procedure Laterality Date    FOOT DEBRIDEMENT      Bone shaving    ILIAC ARTERY STENT Left      Family 
                [facility]              [address] [place] [address]                              CONSULTATION      PATIENT NAME: EMILEE BRADFORD               : 1960  MED REC NO: [id number]                       ROOM: [id number]  ACCOUNT NO: [id number]                       ADMIT DATE: 2024  PROVIDER: Demetrius Bowser MD    DATE OF SERVICE:  2024    ATTENDING PHYSICIAN:  HUEY GOODRICH    REASON FOR CONSULTATION:  Evaluate atrial flutter, hypertension.    CHIEF COMPLAINT:  The patient voices no chief complaint.    HISTORY OF PRESENT ILLNESS:  The patient is a 63-year-old man with a history of paroxysmal atrial flutter, hypertension, hypertensive heart disease, sleep apnea, and type 2 diabetes.  The patient also has a history of stage 4 chronic kidney disease.  He was seen by me back in March as an inpatient [facility].  At that time, he presented with atrial flutter.  Prior to this, he had been seen by me in October of last year and had an echocardiogram which showed normal EF and no significant valve problems.  Back in March, he underwent CHILO cardioversion.  His EF at that time was around 40% to 45%He was placed on amiodarone and the cardioversion was successful.  He was discharged home.  He was seen back in our office in [place] about a week ago and appeared to be in sinus rhythm at that time.    The patient came to his PCP yesterday and was noted to be tachycardic.  An EKG showed what appeared to be atrial flutter versus sinus tachycardia.  He was sent to the [facility] ER and admitted.  He has been found to be in atrial flutter with 2:1 block.  He is currently on IV amiodarone and IV diltiazem and has been running low blood pressures.  He is nonetheless asymptomatic and denies dizziness, palpitations, or unusual shortness of breath.  His creatinine is elevated from baseline and he has been seen by Nephrology.  Furosemide and valsartan are being held.  He otherwise 
Pt seen and examined. Full consult dictated    Cont current Rx. Plan DCC either tomm or Monday    Will keep and try and get him on for flutter ablation early next week . Hold Eliquis and start lovenox   
pain. No nausea. No vomiting. No blood in stool  : No blood in urine. No foamy or cloudy urine  Musculoskeletal: No joint swelling or redeness. No morning stiffness  Endocrine: no cold or heat intolerance  Psych: denies anxiety or depression  Neuro: no light headedness or dizziness    Objective   BP (!) 82/56   Pulse (!) 140   Temp 97.1 °F (36.2 °C) (Oral)   Resp 16   Ht 1.829 m (6')   Wt (!) 149.1 kg (328 lb 11.3 oz)   SpO2 93%   BMI 44.58 kg/m²     Physical Exam:    Gen: NAD/Obese    HEENT: AT/NC, EOMI, moist mucous membrane, no scleral icterus    Neck: no JVD, no cervical lymphadenopathy, no carotid bruit    Lungs/Chest wall: Breath sounds normal. Symmetrical chest wall expansion. No accessory muscle use. Clear to auscultation    Cardiovascular: IRR, Tachycardic    Abdomen: soft, NT, ND, BS+, no HSM    Ext: no clubbing or cyanosis. No edema    Skin: warm and dry. No rashes    : no  paez    CNS: alert awake. Answers appropriately.     Labs/Data:    Lab Results   Component Value Date/Time     06/22/2024 03:06 AM    K 4.4 06/22/2024 03:06 AM     06/22/2024 03:06 AM    CO2 23 06/22/2024 03:06 AM    BUN 83 06/22/2024 03:06 AM    CREATININE 3.93 06/22/2024 03:06 AM    GLUCOSE 128 06/22/2024 03:06 AM    CALCIUM 8.8 06/22/2024 03:06 AM    LABGLOM 16 06/22/2024 03:06 AM    LABGLOM 32 03/24/2024 05:13 AM        Lab Results   Component Value Date    WBC 10.9 06/22/2024    HGB 13.2 06/22/2024    HCT 41.0 06/22/2024    MCV 89.5 06/22/2024     06/22/2024           No results found for: \"SPEP\", \"UPEP\"  @lastutp@  No results found for: \"MALBCR\"      Intake/Output Summary (Last 24 hours) at 6/22/2024 1004  Last data filed at 6/21/2024 2331  Gross per 24 hour   Intake 83.15 ml   Output 2600 ml   Net -2516.85 ml       Wt Readings from Last 3 Encounters:   06/21/24 (!) 149.1 kg (328 lb 11.3 oz)   04/15/24 (!) 144.2 kg (318 lb)   03/24/24 (!) 143.2 kg (315 lb 11.2 oz)       Signed by:    Jack Schaefer,

## 2024-06-25 VITALS
WEIGHT: 315 LBS | SYSTOLIC BLOOD PRESSURE: 146 MMHG | HEIGHT: 72 IN | TEMPERATURE: 98.3 F | RESPIRATION RATE: 19 BRPM | OXYGEN SATURATION: 99 % | DIASTOLIC BLOOD PRESSURE: 69 MMHG | HEART RATE: 70 BPM | BODY MASS INDEX: 42.66 KG/M2

## 2024-06-25 LAB
ALBUMIN SERPL-MCNC: 2.9 G/DL (ref 3.5–5)
ALBUMIN/GLOB SERPL: 0.8 (ref 1.1–2.2)
ALP SERPL-CCNC: 109 U/L (ref 45–117)
ALT SERPL-CCNC: 20 U/L (ref 12–78)
ANION GAP SERPL CALC-SCNC: 6 MMOL/L (ref 5–15)
AST SERPL-CCNC: 21 U/L (ref 15–37)
BILIRUB SERPL-MCNC: 0.5 MG/DL (ref 0.2–1)
BUN SERPL-MCNC: 62 MG/DL (ref 6–20)
BUN/CREAT SERPL: 19 (ref 12–20)
CALCIUM SERPL-MCNC: 8.6 MG/DL (ref 8.5–10.1)
CHLORIDE SERPL-SCNC: 107 MMOL/L (ref 97–108)
CO2 SERPL-SCNC: 24 MMOL/L (ref 21–32)
CREAT SERPL-MCNC: 3.21 MG/DL (ref 0.7–1.3)
ERYTHROCYTE [DISTWIDTH] IN BLOOD BY AUTOMATED COUNT: 14.2 % (ref 11.5–14.5)
GLOBULIN SER CALC-MCNC: 3.5 G/DL (ref 2–4)
GLUCOSE BLD STRIP.AUTO-MCNC: 222 MG/DL (ref 65–117)
GLUCOSE BLD STRIP.AUTO-MCNC: 223 MG/DL (ref 65–117)
GLUCOSE SERPL-MCNC: 207 MG/DL (ref 65–100)
HCT VFR BLD AUTO: 41.5 % (ref 36.6–50.3)
HGB BLD-MCNC: 13 G/DL (ref 12.1–17)
MAGNESIUM SERPL-MCNC: 2.3 MG/DL (ref 1.6–2.4)
MCH RBC QN AUTO: 29.1 PG (ref 26–34)
MCHC RBC AUTO-ENTMCNC: 31.3 G/DL (ref 30–36.5)
MCV RBC AUTO: 92.8 FL (ref 80–99)
NRBC # BLD: 0 K/UL (ref 0–0.01)
NRBC BLD-RTO: 0 PER 100 WBC
PHOSPHATE SERPL-MCNC: 4.4 MG/DL (ref 2.6–4.7)
PLATELET # BLD AUTO: 172 K/UL (ref 150–400)
PMV BLD AUTO: 10.3 FL (ref 8.9–12.9)
POTASSIUM SERPL-SCNC: 5.1 MMOL/L (ref 3.5–5.1)
PROT SERPL-MCNC: 6.4 G/DL (ref 6.4–8.2)
RBC # BLD AUTO: 4.47 M/UL (ref 4.1–5.7)
SERVICE CMNT-IMP: ABNORMAL
SERVICE CMNT-IMP: ABNORMAL
SODIUM SERPL-SCNC: 137 MMOL/L (ref 136–145)
WBC # BLD AUTO: 7.1 K/UL (ref 4.1–11.1)

## 2024-06-25 PROCEDURE — 82962 GLUCOSE BLOOD TEST: CPT

## 2024-06-25 PROCEDURE — 6360000002 HC RX W HCPCS: Performed by: STUDENT IN AN ORGANIZED HEALTH CARE EDUCATION/TRAINING PROGRAM

## 2024-06-25 PROCEDURE — 80053 COMPREHEN METABOLIC PANEL: CPT

## 2024-06-25 PROCEDURE — 36415 COLL VENOUS BLD VENIPUNCTURE: CPT

## 2024-06-25 PROCEDURE — 83735 ASSAY OF MAGNESIUM: CPT

## 2024-06-25 PROCEDURE — 84100 ASSAY OF PHOSPHORUS: CPT

## 2024-06-25 PROCEDURE — 6370000000 HC RX 637 (ALT 250 FOR IP): Performed by: INTERNAL MEDICINE

## 2024-06-25 PROCEDURE — 2580000003 HC RX 258: Performed by: STUDENT IN AN ORGANIZED HEALTH CARE EDUCATION/TRAINING PROGRAM

## 2024-06-25 PROCEDURE — 85027 COMPLETE CBC AUTOMATED: CPT

## 2024-06-25 PROCEDURE — 6370000000 HC RX 637 (ALT 250 FOR IP): Performed by: STUDENT IN AN ORGANIZED HEALTH CARE EDUCATION/TRAINING PROGRAM

## 2024-06-25 PROCEDURE — 6360000002 HC RX W HCPCS: Performed by: INTERNAL MEDICINE

## 2024-06-25 RX ORDER — AMIODARONE HYDROCHLORIDE 200 MG/1
200 TABLET ORAL DAILY
Status: DISCONTINUED | OUTPATIENT
Start: 2024-06-25 | End: 2024-06-25 | Stop reason: HOSPADM

## 2024-06-25 RX ADMIN — AMIODARONE HYDROCHLORIDE 200 MG: 200 TABLET ORAL at 08:58

## 2024-06-25 RX ADMIN — INSULIN GLARGINE 74 UNITS: 100 INJECTION, SOLUTION SUBCUTANEOUS at 07:54

## 2024-06-25 RX ADMIN — METOPROLOL TARTRATE 25 MG: 25 TABLET, FILM COATED ORAL at 07:53

## 2024-06-25 RX ADMIN — GABAPENTIN 300 MG: 300 CAPSULE ORAL at 07:54

## 2024-06-25 RX ADMIN — EMPAGLIFLOZIN 10 MG: 10 TABLET, FILM COATED ORAL at 07:53

## 2024-06-25 RX ADMIN — INSULIN LISPRO 20 UNITS: 100 INJECTION, SOLUTION INTRAVENOUS; SUBCUTANEOUS at 11:55

## 2024-06-25 RX ADMIN — HYALURONIDASE 150 UNITS: 150 INJECTION SUBCUTANEOUS at 11:39

## 2024-06-25 RX ADMIN — APIXABAN 5 MG: 5 TABLET, FILM COATED ORAL at 10:57

## 2024-06-25 RX ADMIN — ENOXAPARIN SODIUM 150 MG: 150 INJECTION SUBCUTANEOUS at 07:53

## 2024-06-25 RX ADMIN — SODIUM CHLORIDE, PRESERVATIVE FREE 10 ML: 5 INJECTION INTRAVENOUS at 07:57

## 2024-06-25 RX ADMIN — INSULIN LISPRO 2 UNITS: 100 INJECTION, SOLUTION INTRAVENOUS; SUBCUTANEOUS at 12:43

## 2024-06-25 RX ADMIN — ASPIRIN 81 MG: 81 TABLET, CHEWABLE ORAL at 07:53

## 2024-06-25 RX ADMIN — INSULIN LISPRO 2 UNITS: 100 INJECTION, SOLUTION INTRAVENOUS; SUBCUTANEOUS at 09:11

## 2024-06-25 RX ADMIN — ATORVASTATIN CALCIUM 40 MG: 40 TABLET, FILM COATED ORAL at 07:53

## 2024-06-25 RX ADMIN — INSULIN LISPRO 20 UNITS: 100 INJECTION, SOLUTION INTRAVENOUS; SUBCUTANEOUS at 09:11

## 2024-06-25 NOTE — PROGRESS NOTES
[facility] EP/ ARRHYTHMIA/ CARDIOLOGY CONSULT      EP Cardiology consult requested by Lissett Marlow MD for atrial flutter  PCP:  Martin Woods MD    Primary cardiologist: Dr. Bowser     Electrophysiology Service  6/25/2024     Assessment:   Atrial flutter  With rapid ventricular response resulting in HFrEF  On Eliquis  Was cardioverted earlier by Matilde Bowser  HFrEF  Likely in the setting of rapid ventricular response   DM2  CKD  PAD  COPD    Plan:   Will arrange for flutter ablation so he can come off amiodarone  Risks benefits and alternatives discussed with patient.   HF managed by Dr. Bowser.   I spent 42 minutes providing care to this acutely ill inpatient with > 50% of the time counseling as well as reviewing the patient's chart, notes, labs, medications and preparing documentation along with assisting in the coordination of care of the patient on the patient's hospital floor/unit.       6/25    Remains in sinus rhythm  Stop amiodarone drip  Start amiodarone po 200 mg daily to bridge him until he gets his ablation. He has been on amiodarone before so no need for loading.   Keep today to make sure Cr continues to trend down     HPI:  Mahin Araya is a 63 y.o. male with history of paroxysmal atrial flutter with rapid ventricular response and tachycardia induced cardiomyopathy who was referred to me by Dr. Bowser for atrial flutter ablation. He has had several presentations in the last year with atrial flutter and rapid ventricular response and he has what appears to be tachycardia induced cardiomyopathy. His EF in March was 35%. He sees Dr. Bowser in clinic.      No Known Allergies  Past Medical History:   Diagnosis Date    Cellulitis of left foot     Charcot's joint of foot due to diabetes (HCC)     Left    DM (diabetes mellitus) (HCC)     HTN (hypertension)     Iliac artery stenosis, left (HCC)     Lymphedema of both lower extremities     Smoker      Past Surgical History: 
      Hospitalist Progress Note    NAME:   Mahin Araya   : 1960   MRN: [id number]     Date/Time: 2024 3:06 PM  Patient PCP: Martin Woods MD    Estimated discharge date: -  Barriers: Cardiology clearance, cardioversion, possible ablation?      Assessment / Plan:    A-fib/flutter on Eliquis  Hypertension  TSH WNL  Given metoprolol 5 mg IV x 1 and Lasix 40 mg IV x 1 in the ED.  EKG with A-fib-rate 140.  Chest x-ray without acute cardiopulmonary process.      Cardiac monitoring  Continue amiodarone-titrate infusion as per orders set  Hold home p.o. amiodarone  Cardiology consult already called, expertise appreciated -noted plans for possible flutter ablation early next week  I's and O's  Daily weights   Cardioversion planned for -can resume cardiac/diabetic diet postprocedure  Hold home losartan hydrochlorothiazide  Noted Eliquis held with treatment Lovenox on board     LAURA on CKD  Baseline creatinine appears to be around 2.2-2.4.  Creatinine on admission 3.89.  BNP 1200.  Trace bilateral lower extremity edema.  Status post IV Lasix x 1 in the ED.       Hold diuresis  Nephrology on board, expertise appreciated  Hold home losartan and hydrochlorothiazide     DM  Peripheral neuropathy  Charcot's foot-left  4/15/2024 Hemoglobin A1c 7.7 .     Hold home oral medications  Continue home Lantus 74 units  Continue home lispro 20 units 3 times daily AC  Sliding scale  Hypoglycemia protocol  Diabetic diet  Continue home gabapentin     PAD  OP F/u with vascular.  Continue Eliquis and aspirin     COPD  Substitute for home inhalers  DuoNebs as needed     Tobacco use disorder  Nicotine patch.  8 minutes of counseling provided.    STOP BANG 5 points  Pt would benefit from sleep study outpatient     Medical Decision Making:   I personally reviewed labs: CBC, BMP  I personally reviewed imaging:  I personally reviewed EKG:  Toxic drug monitoring:   Discussed case with:         Code Status: Full  DVT 
      Hospitalist Progress Note    NAME:   Mahin Araya   : 1960   MRN: [id number]     Date/Time: 2024 10:45 AM  Patient PCP: Martin Woods MD    Estimated discharge date: >48 hrs  Barriers: Cardiology clearance, cardioversion, possible ablation?      Assessment / Plan:    A-fib/flutter on Eliquis  Hypertension  TSH WNL  Given metoprolol 5 mg IV x 1 and Lasix 40 mg IV x 1 in the ED.  EKG with A-fib-rate 140.  Chest x-ray without acute cardiopulmonary process.      Cardiac monitoring  Continue amiodarone-titrate infusion as per orders set  Hold home p.o. amiodarone  Cardiology consult already called, expertise appreciated -noted plans for possible flutter ablation early next week  I's and O's  Daily weights  Cardiac diet  NPO again tonight for possible cardioversion tomorrow if not able to be done today  Hold home losartan hydrochlorothiazide  Noted Eliquis held with treatment Lovenox on board     LAURA on CKD  Baseline creatinine appears to be around 2.2-2.4.  Creatinine on admission 3.89.  BNP 1200.  Trace bilateral lower extremity edema.  Status post IV Lasix x 1 in the ED.       Hold diuresis  Nephrology on board, expertise appreciated  Hold home losartan and hydrochlorothiazide     DM  Peripheral neuropathy  Charcot's foot-left  4/15/2024 Hemoglobin A1c 7.7 .     Hold home oral medications  Continue home Lantus 74 units  Continue home lispro 20 units 3 times daily AC  Sliding scale  Hypoglycemia protocol  Diabetic diet  Continue home gabapentin     PAD  OP F/u with vascular.  Continue Eliquis and aspirin     COPD  Substitute for home inhalers  DuoNebs as needed     Tobacco use disorder  Nicotine patch.  8 minutes of counseling provided.    STOP BANG 5 points  Pt would benefit from sleep study outpatient     Medical Decision Making:   I personally reviewed labs:  CBC, BMP  I personally reviewed imaging:  I personally reviewed EKG:  Toxic drug monitoring:   Discussed case with:         Code 
      Hospitalist Progress Note    NAME:   Mahin Araya   : 1960   MRN: [id number]     Date/Time: 2024 12:33 PM  Patient PCP: Martin Woods MD    Estimated discharge date: >48 hrs  Barriers: Cardiology clearance, possible ablation?      Assessment / Plan:    A-fib on Eliquis  Hypertension  TSH WNL  Given metoprolol 5 mg IV x 1 and Lasix 40 mg IV x 1 in the ED.  EKG with A-fib-rate 140.  Chest x-ray without acute cardiopulmonary process.      Cardiac monitoring  Continue amiodarone-titrate infusion as per orders at  Hold home p.o. amiodarone  Cardiology consult already called, expertise appreciated -noted plans for possible flutter ablation early next week  I's and O's  Daily weights  Cardiac diet  N.p.o. at midnight once more for possible cardioversion on , if not will need to be n.p.o.  night for possible cardioversion Monday depending on cardiology availability as per cardiology note.  Hold home losartan hydrochlorothiazide  Noted Eliquis held with treatment Lovenox on board     LAURA on CKD  Baseline creatinine appears to be around 2.2-2.4.  Creatinine on admission 3.89.  BNP 1200.  Trace bilateral lower extremity edema.  Status post IV Lasix x 1 in the ED.       Hold diuresis  Nephrology on board, expertise appreciated  Hold home losartan and hydrochlorothiazide     DM  Peripheral neuropathy  Charcot's foot-left  4/15/2024 Hemoglobin A1c 7.7 .     Hold home oral medications  Continue home Lantus 74 units  Continue home lispro 20 units 3 times daily AC  Sliding scale  Hypoglycemia protocol  Diabetic diet  Continue home gabapentin     PAD  OP F/u with vascular.  Continue Eliquis and aspirin     COPD  Substitute for home inhalers  DuoNebs as needed     Tobacco use disorder  Nicotine patch.  8 minutes of counseling provided.    Medical Decision Making:   I personally reviewed labs: CBC, BMP  I personally reviewed imaging:  I personally reviewed EKG:  Toxic drug monitoring:   Discussed 
1440: Spoke with Dr. Bowser about patient's HR being 140s and systolic BP in 80s while on 12.5mg/hr dilt gtt. Aware that the patient won't be able to get cardioverted until at least tomorrow. Plan is to restart amio gtt at 1mg/min and wean patient off the dilt gtt. Amio gtt ordered at this time.  
Admission Medication History Technician Note:    Hemodialysis patient: None    Patient preferred pharmacy Confirmed:    [id number] - [place], [place] - [address] [facility] [phone] - F [phone] [address]  [facility] [place] [address]  Phone: [phone] Fax: [phone]      Information obtained from¹: Patient, Rx Query, and Medication List    Does patient manage their medications: yes    Comments/Recommendations: Updated PTA meds/reviewed patient's allergies.    1)  Medication issues identified: Ozempic last injection was 6/16/24 and is stopping. Symbicort patient has not started taking.  Basaglar patient increased it himself.    2)  Medication changes (since last review):  Added  + Kerendia  + Valsar/HCTZ  + vit D    Removed  - Ozempic  - Losartan/HCTZ      Adjusted  Basaglar: Old: 74 units every evening -->New: 80 units every evening  Metoprolol tart: Old: 25mg twice a day -->New: 50mg twice a day      3)  Pertinent Pharmacy Findings:  Identified High Alert Medication Information  Current Anticoagulants Aspirin and Eliquis     ¹RxQuery pharmacy benefit data reflects medications filled and processed through the patient's insurance, however this data does NOT capture whether the medication was picked up or is currently being taken by the patient.    Allergies:  Patient has no known allergies.    Chief Complaint for this Admission:    Chief Complaint   Patient presents with    Irregular Heart Beat     Pt presents ambulatory to ED via triage for elevated HR. Pt reports he was at his PCP's office when he was referred to ED for ; pt states he is asymptomatic. Pt denies any SOB, CP, and N/V/D. Pt is on Eliquis and Amiodarone.      Prior to Admission Medications:   Current Outpatient Medications   Medication Instructions    albuterol sulfate HFA (VENTOLIN HFA) 108 (90 Base) MCG/ACT inhaler 2 puffs, Inhalation, 4 TIMES DAILY PRN    amiodarone (CORDARONE) 200 mg, Oral, DAILY    apixaban 
Attempted to schedule hospital follow up PCP appointment. Office is closed. Pending patient discharge. Evelia Clinton, Care Management Assistant  
Cardiology Progress Note      6/23/2024 1:01 PM    Admit Date: 6/21/2024          Subjective: Remains in aflutter but HR much better controlled . No other new c/o . On IV amio but off IV Dilt          /62   Pulse (!) 135   Temp 97.2 °F (36.2 °C) (Oral)   Resp 15   Ht 1.829 m (6')   Wt (!) 149.1 kg (328 lb 11.3 oz)   SpO2 100%   BMI 44.58 kg/m²   06/21 1901 - 06/23 0700  In: 710 [P.O.:710]  Out: 3600 [Urine:3600]        Objective:      Physical Exam:  VS as above  Chest CTA  Card Irreg irreg no gallop     Data Review:   Labs:    BUN 77  Creat 4.0  Hgb 13.1    Telemetry: aflutter R 70-80      Assessment:       1. Atrial flutter with rapid ventricular response and 2:1 block of uncertain duration, but less than 1 week. HR improved   2. Mild cardiomyopathy with prior EF 40% to 45%.  3. Hypertensive heart disease.  4. Type 2 diabetes.  5. Sleep apnea.  6. Chronic kidney disease stage 4.  7. Peripheral vascular disease with prior iliac stent.      Plan:  NPO jen  for DCC. Will ask Dr Rose to see re ablation              
Cardiology Progress Note      6/24/2024 10:57 AM    Admit Date: 6/21/2024          Subjective:Stable. Reamisn in afib/ flutter          /88   Pulse (!) 120   Temp 97.7 °F (36.5 °C) (Oral)   Resp 12   Ht 1.829 m (6')   Wt (!) 149.1 kg (328 lb 11.3 oz)   SpO2 94%   BMI 44.58 kg/m²   06/22 1901 - 06/24 0700  In: 270 [P.O.:270]  Out: 2350 [Urine:2350]        Objective:      Physical Exam:  VS as above  Chest CTA  Card Irreg irreg no gallop    Data Review:   Labs:    Hgb 13.3   BUN 71  Creat 3.5     Telemetry: afib/ flutter R 70       Assessment:     1. Atrial flutter with rapid ventricular response and 2:1 block of uncertain duration, but less than 1 week. HR improved   2. Mild cardiomyopathy with prior EF 40% to 45%.  3. Hypertensive heart disease.  4. Type 2 diabetes.  5. Sleep apnea.  6. Chronic kidney disease stage 4.  7. Peripheral vascular disease with prior iliac stent.    Plan: Appears to be more in afib now. For DCC later today              
Cardiology Progress Note      6/25/2024 8:07 AM    Admit Date: 6/21/2024          Subjective:  Stable, staying in SR . Dr Hammond note reviewed          BP (!) 120/48   Pulse 80   Temp 97.9 °F (36.6 °C) (Oral)   Resp 12   Ht 1.829 m (6')   Wt (!) 148.9 kg (328 lb 3.2 oz)   SpO2 96%   BMI 44.51 kg/m²   06/23 1901 - 06/25 0700  In: 330 [P.O.:330]  Out: 2240 [Urine:2240]        Objective:      Physical Exam:  VS as above  Chest CTA  Card RRR no gallop     Data Review:   Labs:      BUN 62  Creat 3.2  Hgb 13.0    Telemetry: SR       Assessment:     1. Atrial flutter with rapid ventricular response and 2:1 block of uncertain duration, but less than 1 week. HR improved s/p DCC yest   2. Mild cardiomyopathy with prior EF 40% to 45%.  3. Hypertensive heart disease.  4. Type 2 diabetes.  5. Sleep apnea.  6. Chronic kidney disease stage 4.  7. Peripheral vascular disease with prior iliac stent.    Plan:  OK to d/c.  Dr Rose will contact re timing of ablation. Resuyme amiodarone 200 mg qd and Eliquis              
IV access infiltrated, amiodarone drip stopped per cardiologist recommendation, pt switched to PO. IV content aspirated, swelling and redness site injected with SQ hialuronidaze.      I have reviewed discharge instructions with the patient. The patient verbalized understanding. Discharge medications reviewed with patient and appropriate educational materials and side effects teaching were provided. Follow-up appointments reviewed. Opportunity for questions and clarification was provided.  Venous access removed without difficulty.  Patient's belongings gathered and sent with patient. Patient is ready for discharge.     Meche Wharton RN    
Nursing contacted Nocturnist/cross cover provider via non-urgent messaging system [employer] and notified patient hr sustaining in the 140s, appears has been sustaining nearly 140s since coming to the ER per my d/w nurse and also documented vitals, nurse reported on amiodarone gtt since around 1500, sbp low to mid 90s, otherwise asymptomatic as d/w nurse on the phone, map > 60 and is presently 76, on amio a 1mg/hr presently. No other concerns reported. No acute distress reported. No other information provided by nurse. VSS. Ordered amiodarone 150mg bolus x 1 now, appears per dayshift note cards was called and to possibly cardiovert in the AM, at this time will try additional amio bolus, would consider reaching out to cardiology dr mart seen 03/20/24 his group if remains tachy 140s w/o good response to amio bolus, could also consider cardizem gtt/bolus. Presently as pt is asymptomatic and bp stable will try amio bolus and defer emergent cardioversion presently unless becomes unstable. Will defer further evaluation/management to the day shift primary attending care team. Patient denies any further complaints or concerns. Nursing to notify Hospitalist for further/continued concerns. Will remain available overnight for further concerns if nursing/patient needs. Please note, there are RRT systems in this hospital in place that if nursing has acute or critical patient condition change or concern, this is to help facilitate and notify that patient needs immediate bedside evaluation by a provider. D/w Dr Perez plan of care as above.    Update  0023 went to bedside, pt hr remains 140s, nurse reported she was about to give the amio bolus but noted pt iv RUE infiltrated. On bedside exam appears approx 100ml fluid in the site, pt reported some pain to the site, no other complicating features noted, nurse to replace new piv. I called and d/w pharmacist who will place order for hyaluronidase injection per protocol as 
Patient arrived to the [facility] at 1910 reporte given to Clyde CHENG patient eating his dinner No c/o SOB noted   
Successful DC cardioversion of atrial flutter  Using 200J    No complications   
MD Silvano  [facility] ([facility])             
lungs 2 times daily 30.6 g 1    atorvastatin (LIPITOR) 40 MG tablet Take 1 tablet by mouth daily      gabapentin (NEURONTIN) 300 MG capsule Take 1 capsule by mouth 2 times daily.      dapagliflozin (FARXIGA) 10 MG tablet Take 1 tablet by mouth Every Day      furosemide (LASIX) 40 MG tablet Take 1 tablet by mouth 2 times daily      aspirin 81 MG chewable tablet Take 1 tablet by mouth daily      glipiZIDE (GLUCOTROL XL) 10 MG extended release tablet Take 1 tablet by mouth 2 times daily      insulin glargine (BASAGLAR KWIKPEN) 100 UNIT/ML injection pen Inject 80 Units into the skin nightly      insulin lispro, 1 Unit Dial, (HUMALOG KWIKPEN) 100 UNIT/ML SOPN INJECT 20 UNITS SUBCUTANEOUSLY 3 TIMES A DAY WITH MEALS          Full Code  Care Plan discussed with:  Patient x    Family      RN x    Dialysis RN     Care Manager       Consultant:      Hospitalist         Comments   >50% of visit spent in counseling and coordination of care           Signed By: Gareth Hanks MD     June 25, 2024       This dictation was done by dragon, computer voice recognition software.  Often unanticipated grammatical, syntax, phones and other interpretive errors are inadvertently transcribed.  Please excuse errors that have escaped final proofreading. Please contact me if you suspect dictation or transcription errors.      Dr Gareth Hanks    Office No- [phone]  [place] Office  [address]  [address]  [place] [address]    Fax: [phone]   
tablet by mouth daily      glipiZIDE (GLUCOTROL XL) 10 MG extended release tablet Take 1 tablet by mouth 2 times daily      insulin glargine (BASAGLAR KWIKPEN) 100 UNIT/ML injection pen Inject 80 Units into the skin nightly      insulin lispro, 1 Unit Dial, (HUMALOG KWIKPEN) 100 UNIT/ML SOPN INJECT 20 UNITS SUBCUTANEOUSLY 3 TIMES A DAY WITH MEALS          Full Code  Care Plan discussed with:  Patient     Family      RN     Dialysis RN     Care Manager       Consultant:      Hospitalist         Comments   >50% of visit spent in counseling and coordination of care           Signed By: Gareth Hanks MD     June 24, 2024       This dictation was done by dragon, computer voice recognition software.  Often unanticipated grammatical, syntax, phones and other interpretive errors are inadvertently transcribed.  Please excuse errors that have escaped final proofreading. Please contact me if you suspect dictation or transcription errors.      Dr Gareth Hanks    Office No- [phone]  [place] Office  [address]  [address]  [place] [address]    Fax: [phone]

## 2024-06-25 NOTE — DISCHARGE SUMMARY
losartan and hydrochlorothiazide     DM  Peripheral neuropathy  Charcot's foot-left  4/15/2024 Hemoglobin A1c 7.7 .  Resume home management.  Recommend continuing to discuss SGLT2 and GLP-1 therapy with primary care provider for weight loss and renal preservation.     PAD  OP F/u with vascular.  Continue Eliquis and aspirin     COPD  Substitute for home inhalers  DuoNebs as needed     Tobacco use disorder  Nicotine patch.  8 minutes of counseling provided.     MARIJA  Continue home CPAP    Discharge Exam:  Patient seen and examined by me on discharge day.  Pertinent Findings:  Patient Vitals for the past 24 hrs:   BP Temp Temp src Pulse Resp SpO2 Weight   06/25/24 1025 (!) 146/69 98.3 °F (36.8 °C) Oral 70 19 99 % --   06/25/24 0800 (!) 150/76 98.2 °F (36.8 °C) Oral 75 18 99 % --   06/25/24 0730 134/68 -- -- 75 15 99 % --   06/25/24 0600 -- -- -- -- -- -- (!) 148.9 kg (328 lb 3.2 oz)   06/25/24 0217 (!) 120/48 97.9 °F (36.6 °C) Oral 80 12 96 % --   06/25/24 0000 -- -- -- -- -- 96 % --   06/24/24 2302 119/60 98.5 °F (36.9 °C) Oral 88 22 92 % --   06/24/24 2000 -- -- -- 90 16 98 % --   06/24/24 1945 (!) 165/79 98 °F (36.7 °C) Oral 92 17 99 % --   06/24/24 1930 (!) 152/69 -- -- 91 18 98 % --   06/24/24 1915 (!) 157/71 -- -- 85 21 100 % --   06/24/24 1900 (!) 145/70 -- -- 88 19 100 % --   06/24/24 1730 129/71 -- -- 82 16 -- --   06/24/24 1531 -- -- -- -- -- (!) 66 % --   06/24/24 1445 112/78 97.7 °F (36.5 °C) Oral (!) 135 14 95 % --   06/24/24 1432 109/78 97.6 °F (36.4 °C) Oral (!) 133 16 98 % --       Gen:    Not in distress  Chest: Clear lungs  CVS:   Regular rhythm.  No edema  Abd:  Soft, not distended, not tender  Neuro: awake, moving all exts    Discharge/Recent Laboratory Results:  Recent Labs     06/25/24  0247      K 5.1      CO2 24   BUN 62*   CREATININE 3.21*   GLUCOSE 207*   CALCIUM 8.6   PHOS 4.4   MG 2.3     Recent Labs     06/25/24  0247   HGB 13.0   HCT 41.5   WBC 7.1          Discharge

## 2024-06-25 NOTE — DISCHARGE INSTRUCTIONS
All of your medications from before your hospitalization are the same.  Please take all of your medications as prescribed. If prescribed any medications, please read all pharmacy instructions and inserts. Inform your doctor and pharmacist about all other medications and alternative therapies.  Please follow up with your PCP in 1-2 weeks to be reassessed after your hospital stay.  Please also follow up with cardiology and nephrology.  If you start feeling any symptoms similar to what brought you into the hospital, please come back to the ED to be re-evaluated.

## 2024-06-25 NOTE — CARE COORDINATION
06/25/24 1205   Services At/After Discharge   Transition of Care Consult (CM Consult) N/A   Services At/After Discharge None   Mode of Transport at Discharge Other (see comment)   Confirm Follow Up Transport Family   Condition of Participation: Discharge Planning   The Plan for Transition of Care is related to the following treatment goals: PCP and specialist     Patient will need to call PCP to make follow up appt within 1 week as CM was unable to reach office.Family to transport him home. No further needs from CM    English Renetta CHENG CM   [phone]

## 2024-08-09 ENCOUNTER — HOSPITAL ENCOUNTER (OUTPATIENT)
Facility: HOSPITAL | Age: 64
Discharge: HOME | End: 2024-08-09
Attending: INTERNAL MEDICINE | Admitting: INTERNAL MEDICINE
Payer: COMMERCIAL

## 2024-08-09 ENCOUNTER — ANESTHESIA EVENT (OUTPATIENT)
Facility: HOSPITAL | Age: 64
End: 2024-08-09
Payer: COMMERCIAL

## 2024-08-09 ENCOUNTER — ANESTHESIA (OUTPATIENT)
Facility: HOSPITAL | Age: 64
End: 2024-08-09
Payer: COMMERCIAL

## 2024-08-09 VITALS
SYSTOLIC BLOOD PRESSURE: 117 MMHG | OXYGEN SATURATION: 99 % | BODY MASS INDEX: 42.66 KG/M2 | HEIGHT: 72 IN | WEIGHT: 315 LBS | TEMPERATURE: 97.9 F | HEART RATE: 60 BPM | DIASTOLIC BLOOD PRESSURE: 58 MMHG | RESPIRATION RATE: 18 BRPM

## 2024-08-09 DIAGNOSIS — I48.3 TYPICAL ATRIAL FLUTTER (HCC): Primary | ICD-10-CM

## 2024-08-09 DIAGNOSIS — I47.10 SVT (SUPRAVENTRICULAR TACHYCARDIA) (HCC): ICD-10-CM

## 2024-08-09 DIAGNOSIS — I48.0 PAROXYSMAL ATRIAL FIBRILLATION (HCC): ICD-10-CM

## 2024-08-09 LAB
ECHO BSA: 2.69 M2
GLUCOSE BLD STRIP.AUTO-MCNC: 191 MG/DL (ref 65–117)
GLUCOSE BLD STRIP.AUTO-MCNC: 217 MG/DL (ref 65–117)
SERVICE CMNT-IMP: ABNORMAL
SERVICE CMNT-IMP: ABNORMAL

## 2024-08-09 PROCEDURE — 6360000002 HC RX W HCPCS

## 2024-08-09 PROCEDURE — 2580000003 HC RX 258

## 2024-08-09 PROCEDURE — 93656 COMPRE EP EVAL ABLTJ ATR FIB: CPT | Performed by: INTERNAL MEDICINE

## 2024-08-09 PROCEDURE — 99153 MOD SED SAME PHYS/QHP EA: CPT | Performed by: INTERNAL MEDICINE

## 2024-08-09 PROCEDURE — 2500000003 HC RX 250 WO HCPCS

## 2024-08-09 PROCEDURE — 2720000010 HC SURG SUPPLY STERILE: Performed by: INTERNAL MEDICINE

## 2024-08-09 PROCEDURE — 99152 MOD SED SAME PHYS/QHP 5/>YRS: CPT | Performed by: INTERNAL MEDICINE

## 2024-08-09 PROCEDURE — 82962 GLUCOSE BLOOD TEST: CPT

## 2024-08-09 PROCEDURE — 6360000002 HC RX W HCPCS: Performed by: ANESTHESIOLOGY

## 2024-08-09 PROCEDURE — 2709999900 HC NON-CHARGEABLE SUPPLY: Performed by: INTERNAL MEDICINE

## 2024-08-09 PROCEDURE — C1759 CATH, INTRA ECHOCARDIOGRAPHY: HCPCS | Performed by: INTERNAL MEDICINE

## 2024-08-09 PROCEDURE — 7100000001 HC PACU RECOVERY - ADDTL 15 MIN: Performed by: INTERNAL MEDICINE

## 2024-08-09 PROCEDURE — C1766 INTRO/SHEATH,STRBLE,NON-PEEL: HCPCS | Performed by: INTERNAL MEDICINE

## 2024-08-09 PROCEDURE — C1893 INTRO/SHEATH, FIXED,NON-PEEL: HCPCS | Performed by: INTERNAL MEDICINE

## 2024-08-09 PROCEDURE — 85347 COAGULATION TIME ACTIVATED: CPT

## 2024-08-09 PROCEDURE — C1894 INTRO/SHEATH, NON-LASER: HCPCS | Performed by: INTERNAL MEDICINE

## 2024-08-09 PROCEDURE — 3700000000 HC ANESTHESIA ATTENDED CARE: Performed by: INTERNAL MEDICINE

## 2024-08-09 PROCEDURE — 76937 US GUIDE VASCULAR ACCESS: CPT | Performed by: INTERNAL MEDICINE

## 2024-08-09 PROCEDURE — C1732 CATH, EP, DIAG/ABL, 3D/VECT: HCPCS | Performed by: INTERNAL MEDICINE

## 2024-08-09 PROCEDURE — 3700000001 HC ADD 15 MINUTES (ANESTHESIA): Performed by: INTERNAL MEDICINE

## 2024-08-09 PROCEDURE — 93657 TX L/R ATRIAL FIB ADDL: CPT | Performed by: INTERNAL MEDICINE

## 2024-08-09 PROCEDURE — C1760 CLOSURE DEV, VASC: HCPCS | Performed by: INTERNAL MEDICINE

## 2024-08-09 PROCEDURE — 7100000000 HC PACU RECOVERY - FIRST 15 MIN: Performed by: INTERNAL MEDICINE

## 2024-08-09 RX ORDER — PHENYLEPHRINE HCL IN 0.9% NACL 0.4MG/10ML
SYRINGE (ML) INTRAVENOUS PRN
Status: DISCONTINUED | OUTPATIENT
Start: 2024-08-09 | End: 2024-08-09 | Stop reason: SDUPTHER

## 2024-08-09 RX ORDER — SODIUM CHLORIDE 0.9 % (FLUSH) 0.9 %
5-40 SYRINGE (ML) INJECTION PRN
Status: CANCELLED | OUTPATIENT
Start: 2024-08-09

## 2024-08-09 RX ORDER — SODIUM CHLORIDE 0.9 % (FLUSH) 0.9 %
5-40 SYRINGE (ML) INJECTION PRN
Status: DISCONTINUED | OUTPATIENT
Start: 2024-08-09 | End: 2024-08-09 | Stop reason: HOSPADM

## 2024-08-09 RX ORDER — SODIUM CHLORIDE 9 MG/ML
INJECTION, SOLUTION INTRAVENOUS PRN
Status: CANCELLED | OUTPATIENT
Start: 2024-08-09

## 2024-08-09 RX ORDER — PROCHLORPERAZINE EDISYLATE 5 MG/ML
5 INJECTION INTRAMUSCULAR; INTRAVENOUS
Status: DISCONTINUED | OUTPATIENT
Start: 2024-08-09 | End: 2024-08-09 | Stop reason: HOSPADM

## 2024-08-09 RX ORDER — ONDANSETRON 2 MG/ML
INJECTION INTRAMUSCULAR; INTRAVENOUS PRN
Status: DISCONTINUED | OUTPATIENT
Start: 2024-08-09 | End: 2024-08-09 | Stop reason: SDUPTHER

## 2024-08-09 RX ORDER — SODIUM CHLORIDE 0.9 % (FLUSH) 0.9 %
5-40 SYRINGE (ML) INJECTION EVERY 12 HOURS SCHEDULED
Status: DISCONTINUED | OUTPATIENT
Start: 2024-08-09 | End: 2024-08-09 | Stop reason: HOSPADM

## 2024-08-09 RX ORDER — HEPARIN SODIUM 1000 [USP'U]/ML
INJECTION, SOLUTION INTRAVENOUS; SUBCUTANEOUS PRN
Status: DISCONTINUED | OUTPATIENT
Start: 2024-08-09 | End: 2024-08-09 | Stop reason: SDUPTHER

## 2024-08-09 RX ORDER — SODIUM CHLORIDE 9 MG/ML
INJECTION, SOLUTION INTRAVENOUS CONTINUOUS PRN
Status: DISCONTINUED | OUTPATIENT
Start: 2024-08-09 | End: 2024-08-09 | Stop reason: SDUPTHER

## 2024-08-09 RX ORDER — SUCCINYLCHOLINE CHLORIDE 20 MG/ML
INJECTION INTRAMUSCULAR; INTRAVENOUS PRN
Status: DISCONTINUED | OUTPATIENT
Start: 2024-08-09 | End: 2024-08-09 | Stop reason: SDUPTHER

## 2024-08-09 RX ORDER — SODIUM CHLORIDE 0.9 % (FLUSH) 0.9 %
5-40 SYRINGE (ML) INJECTION EVERY 12 HOURS SCHEDULED
Status: CANCELLED | OUTPATIENT
Start: 2024-08-09

## 2024-08-09 RX ORDER — DEXAMETHASONE SODIUM PHOSPHATE 4 MG/ML
INJECTION, SOLUTION INTRA-ARTICULAR; INTRALESIONAL; INTRAMUSCULAR; INTRAVENOUS; SOFT TISSUE PRN
Status: DISCONTINUED | OUTPATIENT
Start: 2024-08-09 | End: 2024-08-09 | Stop reason: SDUPTHER

## 2024-08-09 RX ORDER — SODIUM CHLORIDE 9 MG/ML
INJECTION, SOLUTION INTRAVENOUS PRN
Status: DISCONTINUED | OUTPATIENT
Start: 2024-08-09 | End: 2024-08-09 | Stop reason: HOSPADM

## 2024-08-09 RX ORDER — ROCURONIUM BROMIDE 10 MG/ML
INJECTION, SOLUTION INTRAVENOUS PRN
Status: DISCONTINUED | OUTPATIENT
Start: 2024-08-09 | End: 2024-08-09 | Stop reason: SDUPTHER

## 2024-08-09 RX ORDER — NALOXONE HYDROCHLORIDE 0.4 MG/ML
INJECTION, SOLUTION INTRAMUSCULAR; INTRAVENOUS; SUBCUTANEOUS PRN
Status: DISCONTINUED | OUTPATIENT
Start: 2024-08-09 | End: 2024-08-09 | Stop reason: HOSPADM

## 2024-08-09 RX ORDER — PROTAMINE SULFATE 10 MG/ML
INJECTION, SOLUTION INTRAVENOUS PRN
Status: DISCONTINUED | OUTPATIENT
Start: 2024-08-09 | End: 2024-08-09 | Stop reason: SDUPTHER

## 2024-08-09 RX ORDER — FENTANYL CITRATE 50 UG/ML
INJECTION, SOLUTION INTRAMUSCULAR; INTRAVENOUS PRN
Status: DISCONTINUED | OUTPATIENT
Start: 2024-08-09 | End: 2024-08-09 | Stop reason: SDUPTHER

## 2024-08-09 RX ORDER — HYDROMORPHONE HYDROCHLORIDE 1 MG/ML
0.25 INJECTION, SOLUTION INTRAMUSCULAR; INTRAVENOUS; SUBCUTANEOUS EVERY 5 MIN PRN
Status: DISCONTINUED | OUTPATIENT
Start: 2024-08-09 | End: 2024-08-09 | Stop reason: HOSPADM

## 2024-08-09 RX ORDER — FENTANYL CITRATE 50 UG/ML
50 INJECTION, SOLUTION INTRAMUSCULAR; INTRAVENOUS EVERY 5 MIN PRN
Status: DISCONTINUED | OUTPATIENT
Start: 2024-08-09 | End: 2024-08-09 | Stop reason: HOSPADM

## 2024-08-09 RX ADMIN — SODIUM CHLORIDE: 9 INJECTION, SOLUTION INTRAVENOUS at 09:39

## 2024-08-09 RX ADMIN — Medication 80 MCG: at 09:52

## 2024-08-09 RX ADMIN — ROCURONIUM BROMIDE 45 MG: 10 INJECTION INTRAVENOUS at 09:53

## 2024-08-09 RX ADMIN — PROPOFOL 90 MG: 10 INJECTION, EMULSION INTRAVENOUS at 09:47

## 2024-08-09 RX ADMIN — FENTANYL CITRATE 50 MCG: 50 INJECTION, SOLUTION INTRAMUSCULAR; INTRAVENOUS at 09:53

## 2024-08-09 RX ADMIN — SUCCINYLCHOLINE CHLORIDE 140 MG: 20 INJECTION, SOLUTION INTRAMUSCULAR; INTRAVENOUS at 09:48

## 2024-08-09 RX ADMIN — ONDANSETRON HYDROCHLORIDE 4 MG: 2 INJECTION, SOLUTION INTRAMUSCULAR; INTRAVENOUS at 09:53

## 2024-08-09 RX ADMIN — HEPARIN SODIUM 12000 UNITS: 1000 INJECTION, SOLUTION INTRAVENOUS; SUBCUTANEOUS at 10:04

## 2024-08-09 RX ADMIN — FENTANYL CITRATE 50 MCG: 50 INJECTION, SOLUTION INTRAMUSCULAR; INTRAVENOUS at 09:47

## 2024-08-09 RX ADMIN — PHENYLEPHRINE HYDROCHLORIDE 40 MCG/MIN: 10 INJECTION INTRAVENOUS at 09:52

## 2024-08-09 RX ADMIN — Medication 80 MCG: at 10:58

## 2024-08-09 RX ADMIN — Medication 80 MCG: at 11:00

## 2024-08-09 RX ADMIN — Medication 80 MCG: at 09:57

## 2024-08-09 RX ADMIN — PROTAMINE SULFATE 80 MG: 10 INJECTION, SOLUTION INTRAVENOUS at 10:53

## 2024-08-09 RX ADMIN — HEPARIN SODIUM 2000 UNITS: 1000 INJECTION, SOLUTION INTRAVENOUS; SUBCUTANEOUS at 10:26

## 2024-08-09 RX ADMIN — ROCURONIUM BROMIDE 5 MG: 10 INJECTION INTRAVENOUS at 09:47

## 2024-08-09 RX ADMIN — DEXAMETHASONE SODIUM PHOSPHATE 4 MG: 4 INJECTION, SOLUTION INTRAMUSCULAR; INTRAVENOUS at 09:53

## 2024-08-09 RX ADMIN — PROPOFOL 40 MG: 10 INJECTION, EMULSION INTRAVENOUS at 09:48

## 2024-08-09 RX ADMIN — FENTANYL CITRATE 50 MCG: 50 INJECTION INTRAMUSCULAR; INTRAVENOUS at 11:34

## 2024-08-09 ASSESSMENT — LIFESTYLE VARIABLES: SMOKING_STATUS: 1

## 2024-08-09 NOTE — FLOWSHEET NOTE
08/09/24 1115   Handoff   Communication Given Periop Handoff/Relief   Handoff phase Phase I receiving   Handoff Given To Grcae PACU RN   Handoff Received From Gayle CARRILLO   Handoff Communication Face to Face;At bedside   Time Handoff Given 1115        08/09/24 1130   Handoff   Communication Given Periop Handoff/Relief   Handoff phase Phase I relief   Handoff Given To Leti   Handoff Received From Zandra   Handoff Communication Face to Face;At bedside   Time Handoff Given 1130

## 2024-08-09 NOTE — PROGRESS NOTES
Patent walked the prado of recovery area. No signs or symptoms of SOB or distress.    I have reviewed discharge instructions with the patient.  The patient verbalized understanding.    Discharge medications reviewed with patient and appropriate educational materials regarding medications and side effects teaching were provided.    Site care instructions reviewed with patient. Pain management teaching completed.    Patient instructed to make follow up appointments per discharge instructions.     Patient belongings packed up and accounted for with patient and family. All patient belongings sent home with patient.    Telemetry monitor and wires removed      Patient signed discharge instructions after reviewing them, and duplicate copy placed in chart.     Pt discharged home with girlfriend Vanessa

## 2024-08-09 NOTE — ANESTHESIA POSTPROCEDURE EVALUATION
Post-Anesthesia Evaluation and Assessment    Patient: Mahin Araya MRN: [id number]  SSN: [id number]    YOB: 1960  Age: 63 y.o.  Sex: male      I have evaluated the patient and they are stable and ready for discharge from the PACU.     Cardiovascular Function/Vital Signs  Visit Vitals  BP (!) 131/57   Pulse 60   Temp 97.9 °F (36.6 °C) (Oral)   Resp 15   Ht 1.829 m (6')   Wt (!) 142.9 kg (315 lb)   SpO2 99%   BMI 42.72 kg/m²       Patient is status post Monitor Anesthesia Care anesthesia for Procedure(s):  Ablation A-flutter  Intravascular ultrasound  Ablation A-fib w complete ep study.    Nausea/Vomiting: None    Postoperative hydration reviewed and adequate.    Pain:  Managed    Neurological Status:   At baseline    Mental Status, Level of Consciousness: Alert and  oriented to person, place, and time    Pulmonary Status:   Adequate oxygenation and airway patent    Complications related to anesthesia: None    Post-anesthesia assessment completed. No concerns    Signed By: Marino Castellano MD     August 9, 2024

## 2024-08-09 NOTE — PROGRESS NOTES
Cardiac Cath Lab Recovery Arrival Note:      Mahin Araya arrived to Cardiac Cath Lab, Recovery Area. Staff introduced to patient. Patient identifiers verified with NAME and DATE OF BIRTH. Procedure verified with patient. Consent forms reviewed and signed by patient or authorized representative and verified. Allergies verified.     Patient and family oriented to department. Patient and family informed of procedure and plan of care.     Questions answered with review. Patient prepped for procedure, per orders from physician, prior to arrival.    Patient on cardiac monitor, non-invasive blood pressure, SPO2 monitor. On RA. Patient is A&Ox 4. Patient reports no pain.     Patient in stretcher, in low position, with side rails up, call bell within reach, patient instructed to call if assistance as needed.    Patient prep in: [facility] Recovery Area, [place] [facility].     Family in: .   Prep by: Krista

## 2024-08-15 LAB
ACT BLD: 269 SECS (ref 79–138)
ACT BLD: 281 SECS (ref 79–138)

## 2024-08-26 ENCOUNTER — OFFICE VISIT (OUTPATIENT)
Age: 64
End: 2024-08-26
Payer: COMMERCIAL

## 2024-08-26 VITALS
WEIGHT: 315 LBS | DIASTOLIC BLOOD PRESSURE: 55 MMHG | HEART RATE: 67 BPM | SYSTOLIC BLOOD PRESSURE: 129 MMHG | BODY MASS INDEX: 42.66 KG/M2 | OXYGEN SATURATION: 96 % | HEIGHT: 72 IN

## 2024-08-26 DIAGNOSIS — Z79.4 TYPE 2 DIABETES MELLITUS WITH HYPERGLYCEMIA, WITH LONG-TERM CURRENT USE OF INSULIN (HCC): Primary | ICD-10-CM

## 2024-08-26 DIAGNOSIS — E11.65 TYPE 2 DIABETES MELLITUS WITH HYPERGLYCEMIA, WITH LONG-TERM CURRENT USE OF INSULIN (HCC): Primary | ICD-10-CM

## 2024-08-26 LAB — HBA1C MFR BLD: 9.5 %

## 2024-08-26 PROCEDURE — 99214 OFFICE O/P EST MOD 30 MIN: CPT | Performed by: INTERNAL MEDICINE

## 2024-08-26 PROCEDURE — 83036 HEMOGLOBIN GLYCOSYLATED A1C: CPT | Performed by: INTERNAL MEDICINE

## 2024-08-26 NOTE — PROGRESS NOTES
I have reviewed all needed documentation in preparation for visit. Verified patient by name and date of birth  Mahin Araya is a 63 y.o. male Diabetes (Type 2 diabetes mellitus with hyperglycemia, with long-term current use of insulin ([facility]))  .    Vitals:    08/26/24 1229   BP: (!) 129/55   Pulse: 67   SpO2: 96%   Weight: (!) 145.2 kg (320 lb)   Height: 1.829 m (6')       No LMP for male patient.         Health Maintenance Review: Patient reminded of \"due or due soon\" health maintenance. I have asked the patient to contact his/her primary care provider (PCP) for follow-up on his/her health maintenance.    \"Have you been to the ER, urgent care clinic since your last visit?  Hospitalized since your last visit?\"    NO    “Have you seen or consulted any other health care providers outside of [facility] since your last visit?”    NO

## 2024-08-26 NOTE — PROGRESS NOTES
This is a 63-year-old gentleman with a history of type 2 diabetes mellitus (who I previously saw in 2019) who returns for follow-up.  He has had type 2 diabetes mellitus x 20 years and has been on multiple medications.  When I first saw him, I added Ozempic to his insulin regimen and we were able to get his A1c down to the 6% range. Unfortunately much has happened since I saw him last .He had an A1c in March 2023 at 10.5%.  He presents today with an A1c of 9.6%.     When I saw him for the first time, he was tolerating 0.25 mg of Ozempic but was unable to go to 0.5 mg..  I suggested that he go custodial between 0.25 and 0.5 which he has done for the last 3 months.  However, he has stopped this medication because of GI side effects and has been off of it since April.  He is also no longer on metformin because of advancing renal insufficiency.  He is now on farxiga.  He is also on Kerendia for his renal insufficiency.     Current diabetes medication  Basaglar 80 units at bedtime  Ozempic 0.375 mg weekly (stopped April 2024)  Humalog 20-40 units with meals  Glipizide 10 mg  Farxiga 10 mg  Freestyle andria 2 (stooped by cardiology)     He has multiple comorbidities related to his diabetes  He has a history of chronic kidney disease stage IV with recent GFR of 21  He has a history of chronic left foot infection that prompted hospitalization in June 2021 continues to be followed by wound care.  He continues to be followed by wound care.   He has a history of peripheral vascular disease status post stent placement left lower extremity  He has a history of morbid obesity  He has a history of obstructive sleep apnea  He was recently hospitalized at [facility] with new onset a fib/flutter with rapid ventricular response and underwent cardioversion and ablation    He had been using the freestyle andria with good result.  He was able to see what was going on with his blood sugars and able to respond

## 2024-09-15 DIAGNOSIS — E11.65 TYPE 2 DIABETES MELLITUS WITH HYPERGLYCEMIA, WITH LONG-TERM CURRENT USE OF INSULIN (HCC): ICD-10-CM

## 2024-09-15 DIAGNOSIS — Z79.4 TYPE 2 DIABETES MELLITUS WITH HYPERGLYCEMIA, WITH LONG-TERM CURRENT USE OF INSULIN (HCC): ICD-10-CM

## 2024-10-22 LAB
CREATININE, EXTERNAL: 2.75
HBA1C MFR BLD HPLC: 8.4 %

## 2024-10-23 LAB
HBA1C MFR BLD HPLC: 8.2 %
PSA, EXTERNAL: 1.6

## 2024-12-30 ENCOUNTER — OFFICE VISIT (OUTPATIENT)
Age: 64
End: 2024-12-30
Payer: MEDICARE

## 2024-12-30 VITALS
HEART RATE: 76 BPM | BODY MASS INDEX: 42.66 KG/M2 | WEIGHT: 315 LBS | HEIGHT: 72 IN | DIASTOLIC BLOOD PRESSURE: 76 MMHG | SYSTOLIC BLOOD PRESSURE: 141 MMHG

## 2024-12-30 DIAGNOSIS — E11.65 TYPE 2 DIABETES MELLITUS WITH HYPERGLYCEMIA, WITH LONG-TERM CURRENT USE OF INSULIN (HCC): Primary | ICD-10-CM

## 2024-12-30 DIAGNOSIS — Z79.4 TYPE 2 DIABETES MELLITUS WITH HYPERGLYCEMIA, WITH LONG-TERM CURRENT USE OF INSULIN (HCC): Primary | ICD-10-CM

## 2024-12-30 PROCEDURE — 95251 CONT GLUC MNTR ANALYSIS I&R: CPT | Performed by: INTERNAL MEDICINE

## 2024-12-30 PROCEDURE — G8417 CALC BMI ABV UP PARAM F/U: HCPCS | Performed by: INTERNAL MEDICINE

## 2024-12-30 PROCEDURE — G8428 CUR MEDS NOT DOCUMENT: HCPCS | Performed by: INTERNAL MEDICINE

## 2024-12-30 PROCEDURE — 1123F ACP DISCUSS/DSCN MKR DOCD: CPT | Performed by: INTERNAL MEDICINE

## 2024-12-30 PROCEDURE — 2022F DILAT RTA XM EVC RTNOPTHY: CPT | Performed by: INTERNAL MEDICINE

## 2024-12-30 PROCEDURE — 99214 OFFICE O/P EST MOD 30 MIN: CPT | Performed by: INTERNAL MEDICINE

## 2024-12-30 PROCEDURE — 4004F PT TOBACCO SCREEN RCVD TLK: CPT | Performed by: INTERNAL MEDICINE

## 2024-12-30 PROCEDURE — 3017F COLORECTAL CA SCREEN DOC REV: CPT | Performed by: INTERNAL MEDICINE

## 2024-12-30 PROCEDURE — 3046F HEMOGLOBIN A1C LEVEL >9.0%: CPT | Performed by: INTERNAL MEDICINE

## 2024-12-30 PROCEDURE — G8484 FLU IMMUNIZE NO ADMIN: HCPCS | Performed by: INTERNAL MEDICINE

## 2024-12-30 RX ORDER — KETOROLAC TROMETHAMINE 30 MG/ML
1 INJECTION, SOLUTION INTRAMUSCULAR; INTRAVENOUS DAILY
Qty: 1 EACH | Refills: 0 | Status: SHIPPED | OUTPATIENT
Start: 2024-12-30

## 2024-12-30 RX ORDER — ACYCLOVIR 800 MG/1
1 TABLET ORAL
Qty: 6 EACH | Refills: 4 | Status: SHIPPED | OUTPATIENT
Start: 2024-12-30

## 2024-12-30 NOTE — PROGRESS NOTES
This is a 63-year-old gentleman with a history of type 2 diabetes mellitus (who I previously saw in 2019) who returns for follow-up.  He has had type 2 diabetes mellitus x 20 years and has been on multiple medications.  When I first saw him, I added Ozempic to his insulin regimen and we were able to get his A1c down to the 6% range. Unfortunately much has happened since I saw him last .He had an A1c in March 2023 at 10.5%.  He presents today with an A1c of 8.4%.     When I saw him for the first time, he was tolerating 0.25 mg of Ozempic but was unable to go to 0.5 mg..  I suggested that he go senior care between 0.25 and 0.5 which he has done for the last 3 months.  However, he has stopped this medication because of GI side effects and has been off of it since April.  He is also no longer on metformin because of advancing renal insufficiency.  He is now on farxiga.  He is also on Kerendia for his renal insufficiency.     Current diabetes medication  Basaglar 80 units at bedtime  Humalog 20-40 units with meals  Glipizide 10 mg  Farxiga 10 mg  Freestyle andria 2     He has multiple comorbidities related to his diabetes  He has a history of chronic kidney disease stage IV with recent GFR of 21  He has a history of chronic left foot infection that prompted hospitalization in June 2021 continues to be followed by wound care.  He continues to be followed by wound care.   He has a history of peripheral vascular disease status post stent placement left lower extremity  He has a history of morbid obesity  He has a history of obstructive sleep apnea  He was recently hospitalized at [facility] with new onset a fib/flutter with rapid ventricular response and underwent cardioversion and ablation    Since I saw him last he has been working more and more on decreasing the carbs and particular portions of his meals and his blood sugars are improving.  The major issue now it appears is that he is forgetting to take

## 2025-01-10 ENCOUNTER — TELEPHONE (OUTPATIENT)
Age: 65
End: 2025-01-10

## 2025-01-10 RX ORDER — HYDROCHLOROTHIAZIDE 12.5 MG/1
CAPSULE ORAL
Qty: 6 EACH | Refills: 3 | Status: SHIPPED | OUTPATIENT
Start: 2025-01-10

## 2025-01-10 NOTE — TELEPHONE ENCOUNTER
Pt called and LVM 1/9 @ 2:33 pm    Pt stated that he needs Dr Saba to re write a prescription for the lifestyle 3 plus. His insurance will not cover the lifestyle 3.    Pt# [phone]

## 2025-01-10 NOTE — TELEPHONE ENCOUNTER
Requested Prescriptions     Pending Prescriptions Disp Refills    Continuous Glucose Sensor (FREESTYLE KAROL 3 PLUS SENSOR) MISC 6 each 3     Sig: Change sensor every 14 days.

## 2025-03-10 ENCOUNTER — HOSPITAL ENCOUNTER (OUTPATIENT)
Facility: HOSPITAL | Age: 65
Discharge: HOME | End: 2025-03-13
Attending: INTERNAL MEDICINE
Payer: MEDICARE

## 2025-03-10 DIAGNOSIS — Z87.891 PERSONAL HISTORY OF TOBACCO USE: ICD-10-CM

## 2025-03-10 DIAGNOSIS — F17.200 CURRENT SMOKER: ICD-10-CM

## 2025-03-10 PROCEDURE — 71271 CT THORAX LUNG CANCER SCR C-: CPT

## 2025-03-19 RX ORDER — HYDROCHLOROTHIAZIDE 12.5 MG/1
CAPSULE ORAL
Qty: 6 EACH | Refills: 3 | Status: SHIPPED | OUTPATIENT
Start: 2025-03-19

## 2025-05-05 ENCOUNTER — OFFICE VISIT (OUTPATIENT)
Age: 65
End: 2025-05-05
Payer: MEDICARE

## 2025-05-05 VITALS
HEART RATE: 65 BPM | WEIGHT: 309 LBS | BODY MASS INDEX: 41.85 KG/M2 | SYSTOLIC BLOOD PRESSURE: 143 MMHG | HEIGHT: 72 IN | DIASTOLIC BLOOD PRESSURE: 80 MMHG

## 2025-05-05 DIAGNOSIS — Z79.4 TYPE 2 DIABETES MELLITUS WITH HYPERGLYCEMIA, WITH LONG-TERM CURRENT USE OF INSULIN (HCC): Primary | ICD-10-CM

## 2025-05-05 DIAGNOSIS — E11.65 TYPE 2 DIABETES MELLITUS WITH HYPERGLYCEMIA, WITH LONG-TERM CURRENT USE OF INSULIN (HCC): Primary | ICD-10-CM

## 2025-05-05 LAB — HBA1C MFR BLD: 8.3 %

## 2025-05-05 PROCEDURE — 99214 OFFICE O/P EST MOD 30 MIN: CPT | Performed by: INTERNAL MEDICINE

## 2025-05-05 PROCEDURE — 83036 HEMOGLOBIN GLYCOSYLATED A1C: CPT | Performed by: INTERNAL MEDICINE

## 2025-05-05 PROCEDURE — 1123F ACP DISCUSS/DSCN MKR DOCD: CPT | Performed by: INTERNAL MEDICINE

## 2025-05-05 PROCEDURE — PBSHW AMB POC HEMOGLOBIN A1C: Performed by: INTERNAL MEDICINE

## 2025-05-05 PROCEDURE — G8417 CALC BMI ABV UP PARAM F/U: HCPCS | Performed by: INTERNAL MEDICINE

## 2025-05-05 PROCEDURE — 4004F PT TOBACCO SCREEN RCVD TLK: CPT | Performed by: INTERNAL MEDICINE

## 2025-05-05 PROCEDURE — G8428 CUR MEDS NOT DOCUMENT: HCPCS | Performed by: INTERNAL MEDICINE

## 2025-05-05 PROCEDURE — 3052F HG A1C>EQUAL 8.0%<EQUAL 9.0%: CPT | Performed by: INTERNAL MEDICINE

## 2025-05-05 PROCEDURE — 2022F DILAT RTA XM EVC RTNOPTHY: CPT | Performed by: INTERNAL MEDICINE

## 2025-05-05 PROCEDURE — 3046F HEMOGLOBIN A1C LEVEL >9.0%: CPT | Performed by: INTERNAL MEDICINE

## 2025-05-05 PROCEDURE — 3017F COLORECTAL CA SCREEN DOC REV: CPT | Performed by: INTERNAL MEDICINE

## 2025-05-05 RX ORDER — BEXAGLIFLOZIN 20 MG
TABLET ORAL
COMMUNITY
Start: 2025-02-18

## 2025-05-05 RX ORDER — INSULIN LISPRO 100 [IU]/ML
20 INJECTION, SOLUTION INTRAVENOUS; SUBCUTANEOUS
COMMUNITY

## 2025-05-05 NOTE — PROGRESS NOTES
This is a 64-year-old gentleman with a history of type 2 diabetes mellitus (who I previously saw in 2019) who returns for follow-up.  He has had type 2 diabetes mellitus x 20 years and has been on multiple medications.  When I first saw him, I added Ozempic to his insulin regimen and we were able to get his A1c down to the 6% range. However, he has stopped this medication because of GI side effects and has been off of it since April. He had an A1c in March 2023 at 10.5%.  He presents today with an A1c of 8.3%.     He is also no longer on metformin because of advancing renal insufficiency.  He is now on farxiga.  He is also on Kerendia for his renal insufficiency.     Current diabetes medication  Basaglar 80 units at bedtime  Humalog 20-40 units with meals  Glipizide 10 mg  Farxiga 10 mg  Freestyle andria 2 (out since October)    He has multiple comorbidities related to his diabetes  He has a history of chronic kidney disease stage IV with recent GFR of 21  He has a history of chronic left foot infection (plantar surface)that prompted hospitalization in June 2021 continues to be followed by wound care.  He recently underwent graft (April 2025)   He has a history of peripheral vascular disease status post stent placement left lower extremity  He has a history of morbid obesity  He has a history of obstructive sleep apnea  He was recently hospitalized at [facility] with new onset a fib/flutter with rapid ventricular response and underwent cardioversion and ablation      He continues to monitor his blood sugar in the morning.  He has been unable to get coverage for his freestyle andria 2.  His fasting blood sugars range between 140 and 250.  Breakfast is usually a bagel or sheth egg and cheese sandwich.  Lunch is a hamburger with French fries or fried shrimp and French fries.  Dinner is a similar meal.  Last night he had Mexican.  He takes roughly 20 units for each of these

## (undated) DEVICE — EXTREMITY III-LF: Brand: MEDLINE INDUSTRIES, INC.

## (undated) DEVICE — BOWL UTIL GRAD 32OZ STRL --

## (undated) DEVICE — TUBING PMP FOR CARTO SYS SMARTABLATE

## (undated) DEVICE — STERILE POLYISOPRENE POWDER-FREE SURGICAL GLOVES: Brand: PROTEXIS

## (undated) DEVICE — ELECTRODE PT RET AD L9FT HI MOIST COND ADH HYDRGEL CORDED

## (undated) DEVICE — SYSTEM CLOSURE 6-12 FR VEN VASC VASCADE MVP

## (undated) DEVICE — CATHETER ABLAT 8FR L115CM 1-6-2MM SPC TIP 3.5MM DF CRV

## (undated) DEVICE — PROVE COVER: Brand: UNBRANDED

## (undated) DEVICE — STERILE (15.2 TAPERED TO 7.6 X 183CM) POLYETHYLENE ACCORDION-FOLDED COVER FOR USE WITH SIEMENS ACUNAV ULTRASOUND CATHETER FAMILY CONNECTOR: Brand: SWIFTLINK TRANSDUCER COVER

## (undated) DEVICE — SOL IRR SOD CL 0.9% 1000ML BTL --

## (undated) DEVICE — YANKAUER,BULB TIP,W/O VENT,RIGID,STERILE: Brand: MEDLINE

## (undated) DEVICE — SHEET,DRAPE,UNDERBUTTOCK,GRAD POUCH,PORT: Brand: MEDLINE

## (undated) DEVICE — NEEDLE HYPO 18GA L1.5IN PNK S STL HUB POLYPR SHLD REG BVL

## (undated) DEVICE — TRAY PREP DRY W/ PREM GLV 2 APPL 6 SPNG 2 UNDPD 1 OVERWRAP

## (undated) DEVICE — PATCH REF EXT FOR CARTO 3 SYS (EA = 6 PACKS)

## (undated) DEVICE — PINNACLE INTRODUCER SHEATH: Brand: PINNACLE

## (undated) DEVICE — BANDAGE,GAUZE,BULKEE II,4.5"X4.1YD,STRL: Brand: MEDLINE

## (undated) DEVICE — CATHETER EP 7FR L115CM 2-8-2MM SPC TIP 2MM 10 ELECTRD D CRV

## (undated) DEVICE — HEART CATH-MRMC: Brand: MEDLINE INDUSTRIES, INC.

## (undated) DEVICE — TUBING SUCT 12FR MAL ALUM SHFT FN CAP VENT UNIV CONN W/ OBT

## (undated) DEVICE — GARMENT,MEDLINE,DVT,INT,CALF,MED, GEN2: Brand: MEDLINE

## (undated) DEVICE — SYR 10ML LUER LOK 1/5ML GRAD --

## (undated) DEVICE — TUBE SET OR SONICVAC NEXUS SONICONE

## (undated) DEVICE — 1 X VERSACROSS TRANSSEPTAL SHEATH (INCLUDING  1 X J-TIP GUIDEWIRE); 1 X VERSACROSS RF WIRE (INCLUDING 1 X CONNECTOR CABLE (SINGLE USE)); 1 X DISPERSIVE ELECTRODE: Brand: VERSACROSS ACCESS SOLUTION

## (undated) DEVICE — CABLE CATH L10FT BLU CONN 34-34 PIN ELECTROGRAM CONDUCTION

## (undated) DEVICE — GUIDEWIRE ENDOSCP L150CM DIA0.035IN TIP L15CM BENT PTFE

## (undated) DEVICE — TRNQT CUFF RMFG 2PRT 18X4 RED -- LAWSON OEM ITEM 312877

## (undated) DEVICE — TUBING, SUCTION, 1/4" X 12', STRAIGHT: Brand: MEDLINE

## (undated) DEVICE — PAD,ABDOMINAL,8"X10",ST,LF: Brand: MEDLINE

## (undated) DEVICE — SUTURE ETHLN SZ 2-0 L30IN NONABSORBABLE BLK L36MM PSLX 3/8 1697H

## (undated) DEVICE — OPEN-END URETERAL CATHETER: Brand: COOK

## (undated) DEVICE — ROCKER SWITCH PENCIL BLADE ELECTRODE, HOLSTER: Brand: EDGE

## (undated) DEVICE — CABLE CATH L10FT YEL CONN 12-12 PIN ELECTROGRAM CONDUCTION

## (undated) DEVICE — CULTURETTE SGL EVAC TUBE PALL -- 100/CA

## (undated) DEVICE — INTENDED FOR TISSUE SEPARATION, AND OTHER PROCEDURES THAT REQUIRE A SHARP SURGICAL BLADE TO PUNCTURE OR CUT.: Brand: BARD-PARKER ® CARBON RIB-BACK BLADES

## (undated) DEVICE — SHEATH GUID 11.5X8.5FR L71MM M CRV L22MM BIDIR STEER CARTO

## (undated) DEVICE — CABLE CATH L10FT RED PIN CONN 34-34 FOR THERMOCOOL

## (undated) DEVICE — 450 ML BOTTLE OF 0.05% CHLORHEXIDINE GLUCONATE IN 99.95% STERILE WATER FOR IRRIGATION, USP AND APPLICATOR.: Brand: IRRISEPT ANTIMICROBIAL WOUND LAVAGE

## (undated) DEVICE — SUTURE VCRL SZ 2-0 L27IN ABSRB UD L26MM SH 1/2 CIR J417H

## (undated) DEVICE — CATHETER MAP D CRV 3-3-3-3-3 MM SPC GALAXY OCTARAY

## (undated) DEVICE — SOLUTION SCRB 2OZ 10% POVIDONE IOD ANTIMIC BTL

## (undated) DEVICE — SWAB CULT LIQ STUART AGR AERB MOD IN BRK SGL RAYON TIP PLAS 220099] BECTON DICKINSON MICRO]

## (undated) DEVICE — PAD,ABDOMINAL,5"X9",ST,LF,25/BX: Brand: MEDLINE INDUSTRIES, INC.

## (undated) DEVICE — MARKER,SKIN,WI/RULER AND LABELS: Brand: MEDLINE

## (undated) DEVICE — SOLUTION IRRIGATION H2O 0797305] ICU MEDICAL INC]

## (undated) DEVICE — CYSTO/BLADDER IRRIGATION SET, REGULATING CLAMP

## (undated) DEVICE — DRSG GZ OIL EMUL CURAD 3X3 --

## (undated) DEVICE — STERILE POLYISOPRENE POWDER-FREE SURGICAL GLOVES WITH EMOLLIENT COATING: Brand: PROTEXIS

## (undated) DEVICE — MEDI-TRACE CADENCE ADULT, DEFIBRILLATION ELECTRODE -RTS  (10 PR/PK) - PHYSIO-CONTROL: Brand: MEDI-TRACE CADENCE

## (undated) DEVICE — DRESSING HEMOSTATIC INTVENT W/O SLT QUIKCLOT

## (undated) DEVICE — REM POLYHESIVE ADULT PATIENT RETURN ELECTRODE: Brand: VALLEYLAB

## (undated) DEVICE — NEEDLE HYPO 25GA L1.5IN BVL ORIENTED ECLIPSE

## (undated) DEVICE — CATHETER ULTRASOUND 10 FRX90 CM FOR CARTO 3 SOUNDSTAR ECO

## (undated) DEVICE — GOWN,SIRUS,FABRNF,XL,20/CS: Brand: MEDLINE